# Patient Record
Sex: FEMALE | Race: WHITE | Employment: OTHER | URBAN - METROPOLITAN AREA
[De-identification: names, ages, dates, MRNs, and addresses within clinical notes are randomized per-mention and may not be internally consistent; named-entity substitution may affect disease eponyms.]

---

## 2017-02-13 NOTE — PROGRESS NOTES
[place] [facility]    Chief Complaint   Patient presents with     Dementia     HPI:    Yeni Pérez is a 89 year old  (2/9/1927), who is being seen today for a federally mandated E/M visit at [facility]. Today's concerns are:  Seizure disorder (H)  Remains on Keppra.  No obvious seizures    Benign essential hypertension  BP ranges in past month: 124/60 - 142/78.  No reports of chest pain or SOB.    PVD (peripheral vascular disease) (H)  No reports of pedal wounds.    Senile dementia without behavioral disturbance  Ongoing memory loss.  Can be very resistant to staff attempts to assist with hygiene.  No reports of recent acute illness or injury.    ALLERGIES: Boniva [ibandronic acid]; Food; Lexapro [escitalopram]; and Zoloft [sertraline]  PAST MEDICAL HISTORY:  has a past medical history of Abnormal mammogram (3./12/2015: 4mm grouping of calcificaions in the posterior central right breast) (11/9/2015); Advanced directives, counseling/discussion, POLST completed 11/9/15, DNR/DNI (11/9/2015); Ataxia (11/9/2015); Benign essential hypertension (11/9/2015); Bilateral dry eyes (11/9/2015); Cardiomegaly (11/9/2015); Chronic fatigue (11/9/2015); Cyst on ear, left, 2/25/2013 (11/9/2015); Diverticulosis of large intestine without hemorrhage (11/9/2015); External hemorrhoids (11/14/2016); Ganglion cyst, 1st MTP left 3/25/2009 (11/9/2015); H/O renal calculi, 1987 (11/9/2015); H/O small bowel obstruction, lysis of adhesions, 1970 (11/9/2015); H/O squamous cell carcinoma of skin, right hand, 11/28/2011 (11/9/2015); Heart murmur (11/9/2015); History of hepatitis A  in 1980 (11/5/2015); History of hepatitis B in 1980 (11/5/2015); Low weight (12/4/2015); Major depression in complete remission (H) (11/9/2015); Osteoporosis, Bone Density 2013: Femeral neck: -2.6 (L) and -2.3 (R) (11/9/2015); Primary osteoarthritis involving multiple joints (11/9/2015); PVC's (premature  ventricular contractions) (11/9/2015); PVD (peripheral vascular disease) (H) (11/9/2015); Seizure disorder (H) (11/9/2015); Senile dementia without behavioral disturbance (12/4/2015); and Shingles.  PAST SURGICAL HISTORY:  has a past surgical history that includes appendectomy; Cystectomy ovarian benign; LAP,LYSIS OF ADHESIONS; Bunionectomy; and cataract iol, rt/lt.  FAMILY HISTORY: family history includes Colon Cancer in her mother; Depression in her daughter; Leukemia in her brother; Myocardial Infarction in her father; Ovarian Cancer in her sister.  SOCIAL HISTORY:  reports that she has quit smoking. She does not have any smokeless tobacco history on file. She reports that she drinks alcohol.    MEDICATIONS:  Current Outpatient Prescriptions   Medication Sig Dispense Refill     docusate sodium (COLACE) 100 MG capsule Take 2 capsules (200 mg) by mouth daily 60 capsule      Misc Natural Products (GLUCOSAMINE CHOND COMPLEX/MSM PO) Take 1 tablet by mouth daily       methylphenidate (RITALIN LA) 20 MG CP24 Take 20 mg by mouth every other day       allopurinol (ZYLOPRIM) 300 MG tablet Take 300 mg by mouth daily       aspirin 325 MG EC tablet Take 325 mg by mouth daily       irbesartan (AVAPRO) 75 MG tablet Take 75 mg by mouth daily       levETIRAcetam (KEPPRA) 500 MG tablet Take 500 mg by mouth daily       Medications reviewed:  Medications reconciled to facility chart and changes were made to reflect current medications as identified as above med list. Below are the changes that were made:   Medications stopped since last EPIC medication reconciliation:   Medications Discontinued During This Encounter   Medication Reason     Multiple Vitamins-Minerals (MULTIVITAMIN PO) Therapy completed     cholecalciferol (VITAMIN D) 1000 UNIT tablet Therapy completed     calcium-vitamin D (CALTRATE) 600-400 MG-UNIT per tablet Therapy completed       Medications started since last [facility] medication reconciliation:  No orders of the  defined types were placed in this encounter.    Patient Active Problem List   Diagnosis     History of hepatitis A  in 1980     History of hepatitis B in 1980     Cyst on ear, left, 2/25/2013     H/O squamous cell carcinoma of skin, right hand, 11/28/2011     Ganglion cyst, 1st MTP left 3/25/2009     Seizure disorder (H)     H/O renal calculi, 1987     PVC's (premature ventricular contractions)     H/O small bowel obstruction, lysis of adhesions, 1970     Osteoporosis, Bone Density 2013: Femeral neck: -2.6 (L) and -2.3 (R)     Cardiomegaly     Diverticulosis of large intestine without hemorrhage     Bilateral dry eyes     Heart murmur     Ataxia     Chronic fatigue     Major depression in complete remission (H)     Benign essential hypertension     Primary osteoarthritis involving multiple joints     PVD (peripheral vascular disease) (H)     Abnormal mammogram (3./12/2015: 4mm grouping of calcificaions in the posterior central right breast)     Advance Care Planning     Senile dementia without behavioral disturbance     Low weight     External hemorrhoids     Case Management:  I have reviewed the care plan and do agree with the plan. Patient resides in community assisted living environment. .  Information reviewed:  Medications, vital signs, orders, and nursing notes.    ROS:  Negative selected, CONSTITUTIONAL: Family reports positive for decline in appetite and loss of weight prior to admission to [facility] since admission.., EYES: Positive for h/o cataract extraction and IOL. Wears glasses., ENT: dysphagia and Positive for hearing loss and bilateral hearing aids., CV: chest pain and lower extremity edema, RESPIRATORY: shortness of breath, cough and asthma, : dysuria and Nocturia, GI: abdominal pain, constipation, diarrhea, nausea and vomiting, NEURO: headaches and Positive for seizure disorder, declining cognition and reports of ataxia., PSYCH: Positive for h/o depression. and MUSCULOSKELETAL:  Positive for h/o osteoporosis and ataxia.  She refuses to use assistive devices for walking    Exam:  /60  Pulse 58  Temp 98  F (36.7  C)  Resp 18  Wt 110 lb (49.9 kg)  BMI 22.22 kg/m2  GENERAL APPEARANCE: Alert, thin, cooperative, female seen. Able to respond verbally. Denies any acute distress. Pale facial color.    HEAD:  Normocephalic.  No facial asymmetry  ENT: Mouth and posterior oropharynx normal, moist mucous membranes, [place], Has her own teeth. Has bilateral hearing aids.    EYES: EOM normal, conjunctiva and lids normal, Wearing eyeglasses.  RESP: Quiet, effortless respirations. No cough. CTA bilaterally.  CV: RRR, Grade 2/6 cardaic murmur. No LE edema.  DP pulses +1 bilateral.  ABDOMEN: Soft thin abdomen. BS's positive all 4 quadrants. No tenderness with palpation. No guarding.   M/S: Slow gait. No pain with ROM of extremities. Crepitus noted on both knees, (R>L)>  NEURO: Oriented to person and loosely to place. No tremors or cogwheeling. Purposeful movement of all extremities without focal weakness. Frequent repetitive statements.  Could not retain a direction given.  SKIN:  Three small open areas near hairline on forehead.  Has a callous (flesh colored) on left outer ankle and redness on right medial 1st metatarsal head.     PSYCH: Mood was positive and bright.     Lab/Diagnostic data:      CBC RESULTS:   Recent Labs   Lab Test 10/17/16 08/01/16  11/12/15 02/13/15   WBC   --   5.7   --   8.3  7.2   RBC   --   3.52   --   3.22*  3.76*   HGB  12.0  11.7   < >  10.9*  12.5   HCT   --   36.3   --   33.5*  38.5   MCV   --   103.1   --   104.0*  102*   MCH   --    --    --    --   33.2*   MCHC   --    --    --    --   32.6   RDW   --   13.4   --   13.5  13.8   PLT   --   226   --   196  223    < > = values in this interval not displayed.       Last Basic Metabolic Panel:  Recent Labs   Lab Test 10/17/16 08/01/16   NA  145  143   POTASSIUM  4.6  4.7   CHLORIDE  108  107   BRIJESH  9.5  9.4   CO2  29   --    BUN   21  21   CR  0.88  0.90   GLC  84  82       Liver Function Studies -   Recent Labs   Lab Test 10/17/16 02/13/15   PROTTOTAL  5.9  6.3   ALBUMIN  3.4  4.3   BILITOTAL  1.4  1.1   ALKPHOS  68  72   AST  21  23   ALT  13  16       TSH   Date Value Ref Range Status   11/12/2015 1.33 mcU/mL Final   ]    ASSESSMENT/PLAN  (G40.909) Seizure disorder (H)  (primary encounter diagnosis)  Comment: Chronic, but no obvious seizures  Plan: Continue current Keppra dose and monitor.    (I10) Benign essential hypertension  Comment: BP goal;: <150/90, at goal  Plan: Continue Avapro and ASA.  Monitor.  Hgb and BMP next lab day,    (I73.9) PVD (peripheral vascular disease) (H)  Comment: Chronic  Plan: Monitor pedal skin.    (F03.90) Senile dementia without behavioral disturbance  Comment: Slowly advancing  Plan: Monitor.  Appears to be appropriate for assisted living at this time.    Electronically signed by:  RAFAEL Flowers CNP

## 2017-02-14 ENCOUNTER — NURSING HOME VISIT (OUTPATIENT)
Dept: GERIATRICS | Facility: CLINIC | Age: 82
End: 2017-02-14
Payer: MEDICARE

## 2017-02-14 VITALS
HEART RATE: 58 BPM | TEMPERATURE: 98 F | BODY MASS INDEX: 22.22 KG/M2 | RESPIRATION RATE: 18 BRPM | DIASTOLIC BLOOD PRESSURE: 60 MMHG | SYSTOLIC BLOOD PRESSURE: 124 MMHG | WEIGHT: 110 LBS

## 2017-02-14 DIAGNOSIS — G40.909 SEIZURE DISORDER (H): Primary | ICD-10-CM

## 2017-02-14 DIAGNOSIS — I10 BENIGN ESSENTIAL HYPERTENSION: ICD-10-CM

## 2017-02-14 DIAGNOSIS — F03.90 SENILE DEMENTIA WITHOUT BEHAVIORAL DISTURBANCE (H): ICD-10-CM

## 2017-02-14 DIAGNOSIS — I73.9 PVD (PERIPHERAL VASCULAR DISEASE) (H): ICD-10-CM

## 2017-02-20 ENCOUNTER — TRANSFERRED RECORDS (OUTPATIENT)
Dept: HEALTH INFORMATION MANAGEMENT | Facility: CLINIC | Age: 82
End: 2017-02-20

## 2017-02-20 LAB
BUN SERPL-MCNC: 21 MG/DL (ref 9–26)
CALCIUM SERPL-MCNC: 9.7 MG/DL (ref 8.4–10.2)
CHLORIDE SERPLBLD-SCNC: 104 MMOL/L (ref 98–109)
CO2 SERPL-SCNC: 28 MMOL/L (ref 22–31)
CREAT SERPL-MCNC: 0.82 MG/DL (ref 0.55–1.02)
GFR SERPL CREATININE-BSD FRML MDRD: >60 ML/MIN/1.73M2
GLUCOSE SERPL-MCNC: 92 MG/DL (ref 70–100)
HEMOGLOBIN: 12.6 G/DL (ref 11.8–15.5)
POTASSIUM SERPL-SCNC: 4.3 MMOL/L (ref 3.5–5.2)
SODIUM SERPL-SCNC: 142 MMOL/L (ref 136–145)

## 2017-04-06 NOTE — PROGRESS NOTES
"[place] [facility]    Chief Complaint   Patient presents with     RECHECK       HPI:    Yeni Pérez is a 89 year old  (2/9/1927), who is being seen today for an episodic care visit at [facility].   Today's concern is:  Cardiac arrhythmia, unspecified cardiac arrhythmia type  Pt has h/o PVC's.  Today on auscultation, had irregular rhythm.  Pt denies chest pain or palpitations and reports \"I feel well\".    Senile dementia without behavioral disturbance  Advancing cognitive loss.  Lacks insight into cognitive loss.  Receives assistance with ADL's,.  Can be very resistive to basic hygiene attempts    Low weight  Refusing ensure.  Current weight reflects 10-13 lbs weight loss, if it is correct.     Major depression in complete remission (H)  Remains on ritalin qod.  No reports of any increase in symptoms and pt denies feelings of sadness.    Benign essential hypertension  No reports of chest pain or SOB.  Remains on Avapro.  Also on ASA qd.      ALLERGIES: Boniva [ibandronic acid]; Food; Lexapro [escitalopram]; and Zoloft [sertraline]  Past Medical, Surgical, Family and Social History reviewed and updated in [facility].    Current Outpatient Prescriptions   Medication Sig Dispense Refill     docusate sodium (COLACE) 100 MG capsule Take 2 capsules (200 mg) by mouth daily 60 capsule      Misc Natural Products (GLUCOSAMINE CHOND COMPLEX/MSM PO) Take 1 tablet by mouth daily       methylphenidate (RITALIN LA) 20 MG CP24 Take 20 mg by mouth every other day       allopurinol (ZYLOPRIM) 300 MG tablet Take 300 mg by mouth daily       aspirin 325 MG EC tablet Take 325 mg by mouth daily       irbesartan (AVAPRO) 75 MG tablet Take 75 mg by mouth daily       levETIRAcetam (KEPPRA) 500 MG tablet Take 500 mg by mouth daily       Patient Active Problem List   Diagnosis     History of hepatitis A  in 1980     History of hepatitis B in 1980     Cyst on ear, left, 2/25/2013     H/O " squamous cell carcinoma of skin, right hand, 11/28/2011     Ganglion cyst, 1st MTP left 3/25/2009     Seizure disorder (H)     H/O renal calculi, 1987     PVC's (premature ventricular contractions)     H/O small bowel obstruction, lysis of adhesions, 1970     Osteoporosis, Bone Density 2013: Femeral neck: -2.6 (L) and -2.3 (R)     Cardiomegaly     Diverticulosis of large intestine without hemorrhage     Bilateral dry eyes     Heart murmur     Ataxia     Chronic fatigue     Major depression in complete remission (H)     Benign essential hypertension     Primary osteoarthritis involving multiple joints     PVD (peripheral vascular disease) (H)     Abnormal mammogram (3./12/2015: 4mm grouping of calcificaions in the posterior central right breast)     Advance Care Planning     Senile dementia without behavioral disturbance     Low weight     External hemorrhoids       REVIEW OF SYSTEMS:  Unobtainable secondary to cognitive impairment or aphasia, but today pt reports no pain or distress    Physical Exam:  /72  Pulse 88  Temp 97.3  F (36.3  C)  Resp 20  Wt 98 lb 8 oz (44.7 kg)  SpO2 96%  BMI 19.89 kg/m2     GENERAL APPEARANCE: Alert, thin, cooperative, female seen. Able to respond verbally. Denies any acute distress. Pale facial color.    HEAD: Normocephalic. No facial asymmetry  ENT: Mouth and posterior oropharynx normal, moist mucous membranes, [place], Has her own teeth. Has left ear hearing aide in place.    EYES: EOM normal, conjunctiva and lids normal, Wearing eyeglasses.  RESP: Quiet, effortless respirations. No cough. CTA bilaterally.  CV: Irregular rhythm, Grade 2/6 cardaic murmur. No LE edema. DP pulses +1 bilateral.  ABDOMEN: Soft thin abdomen. BS's positive all 4 quadrants. No tenderness with palpation. No guarding.   M/S: Slow gait. No pain with ROM of extremities. Crepitus noted on both knees, (R>L)>  NEURO: Oriented to person and loosely to place. No tremors or cogwheeling. Purposeful movement of  all extremities without focal weakness. Frequent repetitive statements. Could not retain a direction given.  PSYCH: Mood was positive and bright.    Recent Labs:      CBC RESULTS:   Recent Labs   Lab Test 02/20/17 10/17/16 08/01/16  11/12/15 02/13/15   WBC   --    --   5.7   --   8.3  7.2   RBC   --    --   3.52   --   3.22*  3.76*   HGB  12.6  12.0  11.7   < >  10.9*  12.5   HCT   --    --   36.3   --   33.5*  38.5   MCV   --    --   103.1   --   104.0*  102*   MCH   --    --    --    --    --   33.2*   MCHC   --    --    --    --    --   32.6   RDW   --    --   13.4   --   13.5  13.8   PLT   --    --   226   --   196  223    < > = values in this interval not displayed.       Last Basic Metabolic Panel:  Recent Labs   Lab Test 02/20/17 10/17/16   NA  142  145   POTASSIUM  4.3  4.6   CHLORIDE  104  108   BRIJESH  9.7  9.5   CO2  28  29   BUN  21  21   CR  0.82  0.88   GLC  92  84     GFR Estimate   Date Value Ref Range Status   02/20/2017 >60 >60 ml/min/1.73m2 Final   10/17/2016 58 ml/min/1.73m2 Final   08/01/2016 >60 ml/min/1.73m2 Final     TSH   Date Value Ref Range Status   11/12/2015 1.33 mcU/mL Final     Assessment/Plan:  (I49.9) Cardiac arrhythmia, unspecified cardiac arrhythmia type  (primary encounter diagnosis)  Comment: New finding  Plan: STAT EKG.  Continue ASA.  Dr. Garcia to see on 4/17.  CBC and BMP on Monday.    (F03.90) Senile dementia without behavioral disturbance  Comment: Advancing  Plan: Anticipate further progression.  Monitor for safety and appropriateness of AL environment.    (R63.6) Low weight  Comment: Ongoing, refuses supplement  Plan: Nsg to reweigh ASAP to verify accurateness of weight.    (F32.5) Major depression in complete remission (H)  Comment: Chronic, but controlled  Plan: Continue qod ritalin.  Review with Dr. Garcia.  If pt is in atrial fibrillation, would need to consider dc of the ritalin.    (I10) Benign essential hypertension  Comment: BP goal; <150/90, at goal  Plan: Continue  POC.      Electronically signed by  RAFAEL Flowers CNP

## 2017-04-07 ENCOUNTER — ASSISTED LIVING VISIT (OUTPATIENT)
Dept: GERIATRICS | Facility: CLINIC | Age: 82
End: 2017-04-07
Payer: MEDICARE

## 2017-04-07 VITALS
OXYGEN SATURATION: 96 % | HEART RATE: 88 BPM | WEIGHT: 98.5 LBS | TEMPERATURE: 97.3 F | SYSTOLIC BLOOD PRESSURE: 138 MMHG | BODY MASS INDEX: 19.89 KG/M2 | RESPIRATION RATE: 20 BRPM | DIASTOLIC BLOOD PRESSURE: 72 MMHG

## 2017-04-07 DIAGNOSIS — F03.90 SENILE DEMENTIA WITHOUT BEHAVIORAL DISTURBANCE (H): ICD-10-CM

## 2017-04-07 DIAGNOSIS — R63.6 LOW WEIGHT: ICD-10-CM

## 2017-04-07 DIAGNOSIS — I49.9 CARDIAC ARRHYTHMIA, UNSPECIFIED CARDIAC ARRHYTHMIA TYPE: Primary | ICD-10-CM

## 2017-04-07 DIAGNOSIS — F32.5 MAJOR DEPRESSION IN COMPLETE REMISSION (H): ICD-10-CM

## 2017-04-07 DIAGNOSIS — I10 BENIGN ESSENTIAL HYPERTENSION: ICD-10-CM

## 2017-04-07 PROCEDURE — 99207 ZZC CDG-CORRECTLY CODED, REVIEWED AND AGREE: CPT | Performed by: NURSE PRACTITIONER

## 2017-04-08 ENCOUNTER — TELEPHONE (OUTPATIENT)
Dept: GERIATRICS | Facility: CLINIC | Age: 82
End: 2017-04-08

## 2017-04-08 ENCOUNTER — TRANSFERRED RECORDS (OUTPATIENT)
Dept: HEALTH INFORMATION MANAGEMENT | Facility: CLINIC | Age: 82
End: 2017-04-08

## 2017-04-08 NOTE — TELEPHONE ENCOUNTER
Called by nursing staff at [facility] about EKG results.  Done today, confirms atrial fib.   Patient has no symptoms, no CP, dyspnea or palpitations.   Reports she feels well.     Reviewed note, will hold Ritalin until seen by primary team next week.   Juany Bose MD

## 2017-04-10 ENCOUNTER — NURSING HOME VISIT (OUTPATIENT)
Dept: GERIATRICS | Facility: CLINIC | Age: 82
End: 2017-04-10
Payer: MEDICARE

## 2017-04-10 ENCOUNTER — TRANSFERRED RECORDS (OUTPATIENT)
Dept: HEALTH INFORMATION MANAGEMENT | Facility: CLINIC | Age: 82
End: 2017-04-10

## 2017-04-10 VITALS
OXYGEN SATURATION: 94 % | RESPIRATION RATE: 16 BRPM | DIASTOLIC BLOOD PRESSURE: 70 MMHG | HEART RATE: 88 BPM | TEMPERATURE: 98 F | SYSTOLIC BLOOD PRESSURE: 122 MMHG

## 2017-04-10 DIAGNOSIS — I48.20 CHRONIC ATRIAL FIBRILLATION (H): Primary | ICD-10-CM

## 2017-04-10 DIAGNOSIS — F32.5 MAJOR DEPRESSION IN COMPLETE REMISSION (H): ICD-10-CM

## 2017-04-10 DIAGNOSIS — I10 BENIGN ESSENTIAL HYPERTENSION: ICD-10-CM

## 2017-04-10 DIAGNOSIS — F03.90 SENILE DEMENTIA WITHOUT BEHAVIORAL DISTURBANCE (H): ICD-10-CM

## 2017-04-10 LAB
BUN SERPL-MCNC: 16 MG/DL (ref 9–26)
CALCIUM SERPL-MCNC: 9.4 MG/DL (ref 8.4–10.2)
CHLORIDE SERPLBLD-SCNC: 105 MMOL/L (ref 98–109)
CO2 SERPL-SCNC: 28 MMOL/L (ref 22–31)
CREAT SERPL-MCNC: 0.73 MG/DL (ref 0.55–1.02)
ERYTHROCYTE [DISTWIDTH] IN BLOOD BY AUTOMATED COUNT: 13.9 % (ref 11–15)
GFR SERPL CREATININE-BSD FRML MDRD: >60 ML/MIN/1.73M2
GLUCOSE SERPL-MCNC: 100 MG/DL (ref 70–100)
HCT VFR BLD AUTO: 38 % (ref 35–46)
HEMOGLOBIN: 12.1 G/DL (ref 11.8–15.5)
MCV RBC AUTO: 101.6 FL (ref 80–100)
PLATELET # BLD AUTO: 261 K/CMM (ref 140–450)
POTASSIUM SERPL-SCNC: 4 MMOL/L (ref 3.5–5.2)
RBC # BLD AUTO: 3.74 M/CMM (ref 3.7–5.2)
SODIUM SERPL-SCNC: 141 MMOL/L (ref 136–145)
WBC # BLD AUTO: 7.1 K/CMM (ref 3.8–11)

## 2017-04-10 PROCEDURE — 99207 ZZC CDG-CORRECTLY CODED, REVIEWED AND AGREE: CPT | Performed by: NURSE PRACTITIONER

## 2017-04-10 RX ORDER — METOPROLOL SUCCINATE 25 MG/1
25 TABLET, EXTENDED RELEASE ORAL DAILY
Qty: 30 TABLET | Refills: 1
Start: 2017-04-10 | End: 2017-11-03

## 2017-04-10 RX ORDER — METHYLPHENIDATE HYDROCHLORIDE 20 MG/1
CAPSULE, EXTENDED RELEASE ORAL
Start: 2017-04-10 | End: 2017-06-15

## 2017-04-10 NOTE — PROGRESS NOTES
[place] [facility]    Chief Complaint   Patient presents with     Heart Problem       HPI:    Yeni Pérez is a 89 year old  (2/9/1927), who is being seen today for an episodic care visit at [facility]. Today's concern is:  Chronic atrial fibrillation (H)  Pt noted to have irregularly irregular rhythm on 4/7.  EKG confirmed atrial fibrillation. On call notified and ritalin is now on hold.  Pt lacks insight into this.  Denies chest pain or palpitations but has short term memory loss, so is not a reliable historian.  Ambulates without walker, even though one has been recommended.  Fall risk is high.  Very frail.    Benign essential hypertension  No reports of chest pain.  Has been on Avapro for many years.    Senile dementia without behavioral disturbance  Advancing memory loss.  Significant short term memory impairment.  Is resistive to efforts to provide assistance with hygiene.  Resides in assisted living environment.  No attempts to elope.     Major depression in complete remission (H)  Has been on ritalin for many years.  Has tolerated reduction of dose and now med has been on hold for 4 days without obvious adverse reactions.      ALLERGIES: Boniva [ibandronic acid]; Food; Lexapro [escitalopram]; and Zoloft [sertraline]  Past Medical, Surgical, Family and Social History reviewed and updated in [facility].    Current Outpatient Prescriptions   Medication Sig Dispense Refill     docusate sodium (COLACE) 100 MG capsule Take 2 capsules (200 mg) by mouth daily 60 capsule      Misc Natural Products (GLUCOSAMINE CHOND COMPLEX/MSM PO) Take 1 tablet by mouth daily       methylphenidate (RITALIN LA) 20 MG CP24 Take 20 mg by mouth every other day (Patient taking differently: Take 20 mg by mouth every other day On Hold)       allopurinol (ZYLOPRIM) 300 MG tablet Take 300 mg by mouth daily       aspirin 325 MG EC tablet Take 325 mg by mouth daily       irbesartan (AVAPRO) 75  MG tablet Take 75 mg by mouth daily       levETIRAcetam (KEPPRA) 500 MG tablet Take 500 mg by mouth daily       Medications reviewed:  Medications reconciled to facility chart and changes were made to reflect current medications as identified as above med list. Below are the changes that were made:   Medications stopped since last EPIC medication reconciliation:   There are no discontinued medications.    Medications started since last [facility] medication reconciliation:  No orders of the defined types were placed in this encounter.    Patient Active Problem List   Diagnosis     History of hepatitis A  in 1980     History of hepatitis B in 1980     Cyst on ear, left, 2/25/2013     H/O squamous cell carcinoma of skin, right hand, 11/28/2011     Ganglion cyst, 1st MTP left 3/25/2009     Seizure disorder (H)     H/O renal calculi, 1987     PVC's (premature ventricular contractions)     H/O small bowel obstruction, lysis of adhesions, 1970     Osteoporosis, Bone Density 2013: Femeral neck: -2.6 (L) and -2.3 (R)     Cardiomegaly     Diverticulosis of large intestine without hemorrhage     Bilateral dry eyes     Heart murmur     Ataxia     Chronic fatigue     Major depression in complete remission (H)     Benign essential hypertension     Primary osteoarthritis involving multiple joints     PVD (peripheral vascular disease) (H)     Abnormal mammogram (3./12/2015: 4mm grouping of calcificaions in the posterior central right breast)     Advance Care Planning     Senile dementia without behavioral disturbance     Low weight     External hemorrhoids     Chronic atrial fibrillation (H)       REVIEW OF SYSTEMS:  Unobtainable secondary to cognitive impairment or aphasia, but today pt reports no pain or distress    Physical Exam:  /70  Pulse 88  Temp 98  F (36.7  C)  Resp 16  SpO2 94%  GENERAL APPEARANCE: Alert, thin, cooperative, female seen. Able to respond verbally. Denies any acute distress. Pale facial color.    HEAD:  Normocephalic. No facial asymmetry  ENT: Mouth and posterior oropharynx normal, moist mucous membranes, [place], Has her own teeth. Has left ear hearing aide in place.    EYES: EOM normal, conjunctiva and lids normal, Wearing eyeglasses.  RESP: Quiet, effortless respirations. No cough. CTA bilaterally.  CV: Irregular rhythm, Grade 2/6 cardaic murmur. No LE edema. DP pulses +1 bilateral.  ABDOMEN: Soft thin abdomen. BS's positive all 4 quadrants. No tenderness with palpation. No guarding.   M/S: Slow gait. No pain with ROM of extremities. Crepitus noted on both knees, (R>L)>  NEURO: Oriented to person and loosely to place. No tremors or cogwheeling. Purposeful movement of all extremities without focal weakness. Frequent repetitive statements. Could not retain a direction given.  PSYCH: Mood was positive and bright.    Recent Labs:    CBC RESULTS:   4/10/17:  WBC: 7.1, Hgb: 12.1, Platelet: 261  Recent Labs   Lab Test 02/20/17 10/17/16 08/01/16  11/12/15 02/13/15   WBC   --    --   5.7   --   8.3  7.2   RBC   --    --   3.52   --   3.22*  3.76*   HGB  12.6  12.0  11.7   < >  10.9*  12.5   HCT   --    --   36.3   --   33.5*  38.5   MCV   --    --   103.1   --   104.0*  102*   MCH   --    --    --    --    --   33.2*   MCHC   --    --    --    --    --   32.6   RDW   --    --   13.4   --   13.5  13.8   PLT   --    --   226   --   196  223    < > = values in this interval not displayed.       Last Basic Metabolic Panel:  4/10/17:  Glucose: 100, BUN: 16, creat: 0.73, est GFR >60, NA: 141, K: 4.0, Chl: 105, CO2: 28.  Recent Labs   Lab Test 02/20/17 10/17/16   NA  142  145   POTASSIUM  4.3  4.6   CHLORIDE  104  108   BRIJESH  9.7  9.5   CO2  28  29   BUN  21  21   CR  0.82  0.88   GLC  92  84     GFR Estimate   Date Value Ref Range Status   04/10/2017 >60 >60 ml/min/1.73m2 Final   02/20/2017 >60 >60 ml/min/1.73m2 Final   10/17/2016 58 ml/min/1.73m2 Final   08/01/2016 >60 ml/min/1.73m2 Final   04/14/2016 60 ml/min/1.73m2 Final      4/8/17:  EKG:  Atrial fibrillation    Family Communication:  Son Maonlo updated with new diagnosis of atrial fibrillation.  Discussed further reduction of ritalin with plan to discontinue medication.  Also to change from Avapro to metoprolol succinate for both BP and pulse control.  Also discussed stroke risk and types of anticoagulants available for reduction of stroke risk.  Due to her poor and unreliable food intake, as well as fall risk, her risk if on coumadin or one of the newer anticoagulants, would likely make her a poor candidate for such therapy.  Discussed use of ASA.  He expressed understanding and agreement with continuing aspirin vs use of coumadin and agrees with plan to change her to metoprolol as wean off Ritalin.  He asked for a call with any change in condition as well as update following Dr. Garcia's visit next Monday.    Assessment/Plan:  (I48.2) Chronic atrial fibrillation (H)  (primary encounter diagnosis)  Comment: New dx  Plan: Wean off Ritalin.  Change from avapro to metoprolol succinate for BP and pulse rate control.  Continue ASA for anticoagulation.  Nsg to check BP and pulse qd for one week and update NP.  Dr. Garcia to see on Monday.    (I10) Benign essential hypertension  Comment: BP goal; <150/90, at goal  Plan: Change from Avapro to Metoprolol succinate as noted above.    (F03.90) Senile dementia without behavioral disturbance  Comment: Advancing  Plan: Continue to monitor for safety.    (F32.5) Major depression in complete remission (H)  Comment: Chronic, with no depressive symptoms at this time  Plan: Decrease ritalin to 20 mg XR today, Thursday and Sunday and nsg to update me in one week.  If doing OK, will discontinue medication.    Total time with patient visit: 40 minutes including discussions about the POC and care coordination with the patient, family,  and caregiver. Greater than 50% of total time spent with counseling and coordinating care.    Electronically signed  by  RAFAEL Flowers CNP

## 2017-04-15 ENCOUNTER — TELEPHONE (OUTPATIENT)
Dept: GERIATRICS | Facility: CLINIC | Age: 82
End: 2017-04-15

## 2017-04-15 NOTE — TELEPHONE ENCOUNTER
Patient has audible wheezing  Vitals stable, SaO2 is 98%  Patient states she is feeling well, does not appear SOB, no cough but nurse states she is just wheezing  Eating and drinking OK, was down for lunch, no other concerns  Orders:   Albuterol MDI 108mcg/act 2 puffs BID  Update NP on monday

## 2017-04-17 ENCOUNTER — ASSISTED LIVING VISIT (OUTPATIENT)
Dept: GERIATRICS | Facility: CLINIC | Age: 82
End: 2017-04-17
Payer: MEDICARE

## 2017-04-17 DIAGNOSIS — I48.20 CHRONIC ATRIAL FIBRILLATION (H): Primary | ICD-10-CM

## 2017-04-17 DIAGNOSIS — F03.90 SENILE DEMENTIA WITHOUT BEHAVIORAL DISTURBANCE (H): ICD-10-CM

## 2017-04-17 DIAGNOSIS — I10 BENIGN ESSENTIAL HYPERTENSION: ICD-10-CM

## 2017-04-17 PROCEDURE — 99207 ZZC NO CHARGE LOS: CPT | Performed by: INTERNAL MEDICINE

## 2017-04-18 NOTE — PROGRESS NOTES
Pt was seen for an episodic AL visit  Case reviewed with NP and with Daughter, who was in attendance.    Pt was noted last week to have an irregular heart rhythm, with EKG confirming afib  Was started on Metoprolol for HR control and HTN (in lieu of irbesartan)  Ritalin is being weaned off    Pt had not obviously symptomatic with afib, however yesterday, was noted to have chest congestion and wheezing.  Symptoms appear resolved this am    Pt has not c/o chest pain, dizziness, palpitations      Exam  In NAD, oriented to person, walking slowly around apartment  HR low 60s  BP 110s-140/  Lungs clear  CV irreg, no M  Abd soft, non-tender  No LE edema      Assessment    afib with controlled VR, apparent new diagnosis. Not clearly symptomatic  No evidence to suggest an acute cardiac event, though Pt is a very poor historian secondary to dementia. Pt is at a prohibitive risk of complications with anticoagulation.  Daughter is in agreement with our assessment not to start anticoagulation    Chest congestion, wheezing yesterday, etiology unclear, resolved    Dementia, advanced    HTN, controlled, on Metoprolol    Plan  Continue to monitor BP and HR, adjust metoprolol as vitals dictate  No anticoagulation  Monitor resp status  TFTs  Continue wean off Ritalin, monitor mental status

## 2017-05-17 ENCOUNTER — TRANSFERRED RECORDS (OUTPATIENT)
Dept: HEALTH INFORMATION MANAGEMENT | Facility: CLINIC | Age: 82
End: 2017-05-17

## 2017-06-19 ENCOUNTER — ASSISTED LIVING VISIT (OUTPATIENT)
Dept: GERIATRICS | Facility: CLINIC | Age: 82
End: 2017-06-19
Payer: MEDICARE

## 2017-06-19 VITALS
RESPIRATION RATE: 16 BRPM | HEIGHT: 59 IN | SYSTOLIC BLOOD PRESSURE: 124 MMHG | BODY MASS INDEX: 24.19 KG/M2 | OXYGEN SATURATION: 93 % | HEART RATE: 64 BPM | TEMPERATURE: 96.3 F | WEIGHT: 120 LBS | DIASTOLIC BLOOD PRESSURE: 60 MMHG

## 2017-06-19 DIAGNOSIS — M15.0 PRIMARY OSTEOARTHRITIS INVOLVING MULTIPLE JOINTS: ICD-10-CM

## 2017-06-19 DIAGNOSIS — F32.5 MAJOR DEPRESSION IN COMPLETE REMISSION (H): ICD-10-CM

## 2017-06-19 DIAGNOSIS — F03.90 SENILE DEMENTIA WITHOUT BEHAVIORAL DISTURBANCE (H): ICD-10-CM

## 2017-06-19 DIAGNOSIS — I10 BENIGN ESSENTIAL HYPERTENSION: ICD-10-CM

## 2017-06-19 DIAGNOSIS — I48.20 CHRONIC ATRIAL FIBRILLATION (H): ICD-10-CM

## 2017-06-19 DIAGNOSIS — L30.9 DERMATITIS DUE TO UNKNOWN CAUSE: ICD-10-CM

## 2017-06-19 DIAGNOSIS — G40.909 SEIZURE DISORDER (H): Primary | ICD-10-CM

## 2017-06-19 RX ORDER — TRIAMCINOLONE ACETONIDE OINTMENT USP, 0.05% 0.5 MG/G
OINTMENT TOPICAL
Start: 2017-06-19 | End: 2017-10-12

## 2017-06-19 NOTE — PROGRESS NOTES
[facility]    Chief Complaint   Patient presents with     Chronic Disease Management       HPI:    Yeni Pérez is a 89 year old  (2/9/1927), who is being seen today for a visit at [facility].  HPI information obtained from: facility chart records, facility staff, patient report and [facility] chart reviewPt is unreliable historian due to memory loss.. Today's concerns are:  Seizure disorder (H)  Remains on Keppra.  No obvious recent seizures.    Major depression in complete remission (H)  Has been tapered off Ritalin.  No reports of signs of depressive symptoms. In the moment denies any feelings of sadness.    Benign essential hypertension  No reports of chest pain.  Remains on Metoprolol. No reports of obvious dizziness. Remains on ASA daily.    Primary osteoarthritis involving multiple joints  Pt denies any joint or back pain.    Senile dementia without behavioral disturbance  Slow cognitive decline.  Nsg administers medications and attempts to assist with hygiene needs.  She will often refuse.  Has an area on her scalp that she has scratched often, but refuses ointment or lotion to the area.    Chronic atrial fibrillation (H)  No reports of heart rates >100/min.  On ASA qd and metoprolol qd.    ALLERGIES: Boniva [ibandronic acid]; Food; Lexapro [escitalopram]; and Zoloft [sertraline]  PAST MEDICAL HISTORY:  has a past medical history of Abnormal mammogram (3./12/2015: 4mm grouping of calcificaions in the posterior central right breast) (11/9/2015); Advanced directives, counseling/discussion, POLST completed 11/9/15, DNR/DNI (11/9/2015); Ataxia (11/9/2015); Benign essential hypertension (11/9/2015); Bilateral dry eyes (11/9/2015); Cardiomegaly (11/9/2015); Chronic atrial fibrillation (H) (4/10/2017); Chronic fatigue (11/9/2015); Cyst on ear, left, 2/25/2013 (11/9/2015); Diverticulosis of large intestine without hemorrhage (11/9/2015); External  hemorrhoids (11/14/2016); Ganglion cyst, 1st MTP left 3/25/2009 (11/9/2015); H/O renal calculi, 1987 (11/9/2015); H/O small bowel obstruction, lysis of adhesions, 1970 (11/9/2015); H/O squamous cell carcinoma of skin, right hand, 11/28/2011 (11/9/2015); Heart murmur (11/9/2015); History of hepatitis A  in 1980 (11/5/2015); History of hepatitis B in 1980 (11/5/2015); Low weight (12/4/2015); Major depression in complete remission (H) (11/9/2015); Osteoporosis, Bone Density 2013: Femeral neck: -2.6 (L) and -2.3 (R) (11/9/2015); Primary osteoarthritis involving multiple joints (11/9/2015); PVC's (premature ventricular contractions) (11/9/2015); PVD (peripheral vascular disease) (H) (11/9/2015); Seizure disorder (H) (11/9/2015); Senile dementia without behavioral disturbance (12/4/2015); and Shingles.  PAST SURGICAL HISTORY:  has a past surgical history that includes appendectomy; Cystectomy ovarian benign; LAP,LYSIS OF ADHESIONS; Bunionectomy; and cataract iol, rt/lt.  FAMILY HISTORY: family history includes Colon Cancer in her mother; Depression in her daughter; Leukemia in her brother; Myocardial Infarction in her father; Ovarian Cancer in her sister.  SOCIAL HISTORY:  reports that she has quit smoking. She does not have any smokeless tobacco history on file. She reports that she drinks alcohol.    MEDICATIONS:  Current Outpatient Prescriptions   Medication Sig Dispense Refill     mupirocin (BACTROBAN) 2 % ointment Apply topically 2 times daily Apply to scalp       Docusate Sodium (COLACE PO) Take 100 mg by mouth daily       triamcinolone (KENALOG) 0.1 % ointment Apply topically 2 times daily Apply to right wrist       metoprolol (TOPROL-XL) 25 MG 24 hr tablet Take 1 tablet (25 mg) by mouth daily HS 30 tablet 1     Misc Natural Products (GLUCOSAMINE CHOND COMPLEX/MSM PO) Take 1 tablet by mouth daily       allopurinol (ZYLOPRIM) 300 MG tablet Take 300 mg by mouth daily       aspirin 325 MG EC tablet Take 325 mg by  mouth daily       levETIRAcetam (KEPPRA) 500 MG tablet Take 500 mg by mouth daily       Medications reviewed:  Medications reconciled to facility chart and changes were made to reflect current medications as identified as above med list. Below are the changes that were made:   Medications stopped since last EPIC medication reconciliation:   There are no discontinued medications.    Medications started since last [facility] medication reconciliation:  No orders of the defined types were placed in this encounter.    Patient Active Problem List   Diagnosis     History of hepatitis A  in 1980     History of hepatitis B in 1980     Cyst on ear, left, 2/25/2013     H/O squamous cell carcinoma of skin, right hand, 11/28/2011     Ganglion cyst, 1st MTP left 3/25/2009     Seizure disorder (H)     H/O renal calculi, 1987     PVC's (premature ventricular contractions)     H/O small bowel obstruction, lysis of adhesions, 1970     Osteoporosis, Bone Density 2013: Femeral neck: -2.6 (L) and -2.3 (R)     Cardiomegaly     Diverticulosis of large intestine without hemorrhage     Bilateral dry eyes     Heart murmur     Ataxia     Chronic fatigue     Major depression in complete remission (H)     Benign essential hypertension     Primary osteoarthritis involving multiple joints     PVD (peripheral vascular disease) (H)     Abnormal mammogram (3./12/2015: 4mm grouping of calcificaions in the posterior central right breast)     Advance Care Planning     Senile dementia without behavioral disturbance     Low weight     External hemorrhoids     Chronic atrial fibrillation (H)     Dermatitis due to unknown cause, scalp     Case Management:  I have reviewed the care plan and do agree with the plan. Patient's desire to return to the community is not assessible due to cognitive impairment.  Information reviewed:  Medications, vital signs, orders, and nursing notes.    ROS:  Unobtainable secondary to cognitive impairment, but today pt reports no  "pain or distress    Exam:  Vitals: /60  Pulse 64  Temp 96.3  F (35.7  C)  Resp 16  Ht 4' 11\" (1.499 m)  Wt 120 lb (54.4 kg)  SpO2 93%  BMI 24.24 kg/m2  BMI= Body mass index is 24.24 kg/(m^2).  GENERAL APPEARANCE: Alert, thin, cooperative, female seen. Able to respond verbally. Denies any acute distress. Pale facial color.    HEAD: Normocephalic. No facial asymmetry  ENT: Mouth and posterior oropharynx normal, moist mucous membranes, [place], Has her own teeth. Has left ear hearing aide in place.    EYES: EOM normal, conjunctiva and lids normal, Wearing eyeglasses.  RESP: Quiet, effortless respirations. No cough. CTA bilaterally.  CV: Irregular rhythm, Grade 2/6 cardaic murmur. No LE edema. DP pulses +1 bilateral.  ABDOMEN: Soft thin abdomen. BS's positive all 4 quadrants. No tenderness with palpation. No guarding.   M/S: Slow gait. No pain with ROM of extremities. Crepitus noted on both knees, (R>L)>  NEURO: Oriented to person and loosely to place. No tremors or cogwheeling. Purposeful movement of all extremities without focal weakness. Frequent repetitive statements.   SKIN:  Has excoriated area at mid hairline on forehead of approx 2 cm.  Hair has broken off in area.  Pt reports that she does not want medicine and \"just need to quit scratching\".  PSYCH: Mood was positive and bright.    Lab/Diagnostic data:    CBC RESULTS:   Recent Labs   Lab Test 04/10/17 02/20/17  08/01/16  02/13/15   WBC  7.1   --    --   5.7   < >  7.2   RBC  3.74   --    --   3.52   < >  3.76*   HGB  12.1  12.6   < >  11.7   < >  12.5   HCT  38.0   --    --   36.3   < >  38.5   MCV  101.6*   --    --   103.1   < >  102*   MCH   --    --    --    --    --   33.2*   MCHC   --    --    --    --    --   32.6   RDW  13.9   --    --   13.4   < >  13.8   PLT  261   --    --   226   < >  223    < > = values in this interval not displayed.       Last Basic Metabolic Panel:  Recent Labs   Lab Test 04/10/17 02/20/17   NA  141  142   POTASSIUM  " 4.0  4.3   CHLORIDE  105  104   BRIJESH  9.4  9.7   CO2  28  28   BUN  16  21   CR  0.73  0.82   GLC  100  92     GFR Estimate   Date Value Ref Range Status   04/10/2017 >60 >60 ml/min/1.73m2 Final   02/20/2017 >60 >60 ml/min/1.73m2 Final   10/17/2016 58 ml/min/1.73m2 Final   08/01/2016 >60 ml/min/1.73m2 Final   04/14/2016 60 ml/min/1.73m2 Final     ASSESSMENT/PLAN  (G40.909) Seizure disorder (H)  (primary encounter diagnosis)  Comment: Chronic, but no obvious recent seizures.  Plan: Continue daily Keppra and monitor.    (F32.5) Major depression in complete remission (H)  Comment: No reoccurrence with recent taper of Ritalin  Plan: Monitor for reoccurrence.    (I10) Benign essential hypertension  Comment: BP goal; <150/90, at goal  Plan: Continue current POC.    (M15.0) Primary osteoarthritis involving multiple joints  Comment: Chronic, pain controlled with glucosomine  Plan: Continue current POC.    (F03.90) Senile dementia without behavioral disturbance  Comment: Advancing slowly  Plan: Monitor for progression. May eventually need move to memory care unit.    (I48.2) Chronic atrial fibrillation (H)  Comment: Chronic  Plan: Continue current POC.    (L30.9) Dermatitis due to unknown cause, scalp  Comment: Acute  Plan: triamcinolone acetonide 0.05 % OINT bid if pt will permit.  Monitor for improvement    Electronically signed by:  RAFAEL Flowers CNP

## 2017-07-12 ENCOUNTER — TRANSFERRED RECORDS (OUTPATIENT)
Dept: HEALTH INFORMATION MANAGEMENT | Facility: CLINIC | Age: 82
End: 2017-07-12

## 2017-08-17 ENCOUNTER — ASSISTED LIVING VISIT (OUTPATIENT)
Dept: GERIATRICS | Facility: CLINIC | Age: 82
End: 2017-08-17
Payer: MEDICARE

## 2017-08-17 VITALS
HEART RATE: 74 BPM | WEIGHT: 108 LBS | RESPIRATION RATE: 16 BRPM | HEIGHT: 59 IN | SYSTOLIC BLOOD PRESSURE: 128 MMHG | TEMPERATURE: 97.4 F | BODY MASS INDEX: 21.77 KG/M2 | DIASTOLIC BLOOD PRESSURE: 60 MMHG

## 2017-08-17 DIAGNOSIS — F03.90 SENILE DEMENTIA WITHOUT BEHAVIORAL DISTURBANCE (H): ICD-10-CM

## 2017-08-17 DIAGNOSIS — M15.0 PRIMARY OSTEOARTHRITIS INVOLVING MULTIPLE JOINTS: ICD-10-CM

## 2017-08-17 DIAGNOSIS — I10 BENIGN ESSENTIAL HYPERTENSION: ICD-10-CM

## 2017-08-17 DIAGNOSIS — G40.909 SEIZURE DISORDER (H): Primary | ICD-10-CM

## 2017-08-17 RX ORDER — EMOLLIENT BASE
CREAM (GRAM) TOPICAL 2 TIMES DAILY
COMMUNITY

## 2017-08-17 NOTE — PROGRESS NOTES
[facility]    Chief Complaint   Patient presents with     RECHECK     HPI:    Yeni Pérez is a 89 year old  (2/9/1927), who is being [place] today [place] [facility]  HPI information obtained from: facility chart records, facility staff, patient report and [facility] chart review. Pt is unreliable historian due to dementia. .Today's concern is:  Seizure disorder (H)  No reports of obvious seizures.  Remains on Keppra    Benign essential hypertension  No reports of chest pain or falls.  No changes in her metoprolol dose.  Remains on ASA daily.    Primary osteoarthritis involving multiple joints  Pt denies pain today    Senile dementia without behavioral disturbance  Advancing dementia.  Resistive to some attempts to assist with hygiene.  Attends common dining.  No attempts to elope.    ALLERGIES: Boniva [ibandronic acid]; Food; Lexapro [escitalopram]; and Zoloft [sertraline]  Past Medical, Surgical, Family and Social History reviewed and updated in [facility].    Current Outpatient Prescriptions   Medication Sig Dispense Refill     emollient (VANICREAM) cream Apply topically 2 times daily       triamcinolone acetonide 0.05 % OINT Apply to dermatitis on scalp near hairline on forehead bid.       Docusate Sodium (COLACE PO) Take 100 mg by mouth daily       triamcinolone (KENALOG) 0.1 % ointment Apply topically 2 times daily Apply to right wrist       metoprolol (TOPROL-XL) 25 MG 24 hr tablet Take 1 tablet (25 mg) by mouth daily HS 30 tablet 1     Misc Natural Products (GLUCOSAMINE CHOND COMPLEX/MSM PO) Take 1 tablet by mouth daily       allopurinol (ZYLOPRIM) 300 MG tablet Take 300 mg by mouth daily       aspirin 325 MG EC tablet Take 325 mg by mouth daily       levETIRAcetam (KEPPRA) 500 MG tablet Take 500 mg by mouth daily       Medications reviewed:  Medications reconciled to facility chart and changes were made to reflect current medications as  "identified as above med list. Below are the changes that were made:   Medications stopped since last EPIC medication reconciliation:   Medications Discontinued During This Encounter   Medication Reason     mupirocin (BACTROBAN) 2 % ointment Therapy completed       Medications started since last [facility] medication reconciliation:  Orders Placed This Encounter   Medications     emollient (VANICREAM) cream     Sig: Apply topically 2 times daily     Patient Active Problem List   Diagnosis     History of hepatitis A  in 1980     History of hepatitis B in 1980     Cyst on ear, left, 2/25/2013     H/O squamous cell carcinoma of skin, right hand, 11/28/2011     Ganglion cyst, 1st MTP left 3/25/2009     Seizure disorder (H)     H/O renal calculi, 1987     PVC's (premature ventricular contractions)     H/O small bowel obstruction, lysis of adhesions, 1970     Osteoporosis, Bone Density 2013: Femeral neck: -2.6 (L) and -2.3 (R)     Cardiomegaly     Diverticulosis of large intestine without hemorrhage     Bilateral dry eyes     Heart murmur     Ataxia     Chronic fatigue     Major depression in complete remission (H)     Benign essential hypertension     Primary osteoarthritis involving multiple joints     PVD (peripheral vascular disease) (H)     Abnormal mammogram (3./12/2015: 4mm grouping of calcificaions in the posterior central right breast)     Advance Care Planning     Senile dementia without behavioral disturbance     Low weight     External hemorrhoids     Chronic atrial fibrillation (H)     Dermatitis due to unknown cause, scalp       REVIEW OF SYSTEMS:  Unobtainable secondary to cognitive impairment, but today pt reports no pain or distress    Physical Exam:  /60  Pulse 74  Temp 97.4  F (36.3  C)  Resp 16  Ht 4' 11\" (1.499 m)  Wt 108 lb (49 kg)  BMI 21.81 kg/m2  GENERAL APPEARANCE: Alert, thin, cooperative, female seen. Able to respond verbally. Denies any acute distress. Pale facial color.    HEAD: " Normocephalic. No facial asymmetry  ENT: Mouth and posterior oropharynx normal, moist mucous membranes, [place], Has her own teeth. Has left ear hearing aide in place.    EYES: EOM normal, conjunctiva and lids normal, Wearing eyeglasses.  RESP: Quiet, effortless respirations. No cough. CTA bilaterally.  CV: Irregular rhythm, Grade 2/6 cardaic murmur. No LE edema. DP pulses +1 bilateral.  ABDOMEN: Soft thin abdomen. BS's positive all 4 quadrants. No tenderness with palpation. No guarding.   M/S: Slow gait. No pain with ROM of extremities. Crepitus noted on both knees, (R>L)>  NEURO: Oriented to person and loosely to place. No tremors or cogwheeling. Purposeful movement of all extremities without focal weakness. Frequent repetitive statements.   PSYCH: Mood was positive and bright.    Recent Labs:    CBC RESULTS:   Recent Labs   Lab Test 04/10/17 02/20/17  08/01/16  02/13/15   WBC  7.1   --    --   5.7   < >  7.2   RBC  3.74   --    --   3.52   < >  3.76*   HGB  12.1  12.6   < >  11.7   < >  12.5   HCT  38.0   --    --   36.3   < >  38.5   MCV  101.6*   --    --   103.1   < >  102*   MCH   --    --    --    --    --   33.2*   MCHC   --    --    --    --    --   32.6   RDW  13.9   --    --   13.4   < >  13.8   PLT  261   --    --   226   < >  223    < > = values in this interval not displayed.       Last Basic Metabolic Panel:  Recent Labs   Lab Test 04/10/17 02/20/17   NA  141  142   POTASSIUM  4.0  4.3   CHLORIDE  105  104   BRIJESH  9.4  9.7   CO2  28  28   BUN  16  21   CR  0.73  0.82   GLC  100  92     GFR Estimate   Date Value Ref Range Status   04/10/2017 >60 >60 ml/min/1.73m2 Final   02/20/2017 >60 >60 ml/min/1.73m2 Final   10/17/2016 58 ml/min/1.73m2 Final     Liver Function Studies -   Recent Labs   Lab Test 10/17/16 02/13/15   PROTTOTAL  5.9  6.3   ALBUMIN  3.4  4.3   BILITOTAL  1.4  1.1   ALKPHOS  68  72   AST  21  23   ALT  13  16       TSH   Date Value Ref Range Status   11/12/2015 1.33 mcU/mL Final    ]    Assessment/Plan:  (G40.909) Seizure disorder (H)  (primary encounter diagnosis)  Comment: Chronic, no evidence of recent seizures.  Plan: Continue current POC.    (I10) Benign essential hypertension  Comment: BP goal: <150/90, at goal  Plan: Continue current POC.  BMP on Monday.    (M15.0) Primary osteoarthritis involving multiple joints  Comment: Chronic, pain controlled  Plan: Continue current POC.    (F03.90) Senile dementia without behavioral disturbance  Comment: Slowly advancing  Plan: Continue current pOC.    Electronically signed by  RAFAEL Flowers CNP

## 2017-08-22 ENCOUNTER — TELEPHONE (OUTPATIENT)
Dept: GERIATRICS | Facility: CLINIC | Age: 82
End: 2017-08-22

## 2017-08-22 DIAGNOSIS — K59.01 SLOW TRANSIT CONSTIPATION: Primary | ICD-10-CM

## 2017-08-22 RX ORDER — DOCUSATE SODIUM 100 MG/1
100 CAPSULE, LIQUID FILLED ORAL EVERY OTHER DAY
Qty: 60 CAPSULE
Start: 2017-08-22 | End: 2019-01-01

## 2017-08-22 NOTE — TELEPHONE ENCOUNTER
Yeni has been on LASHA.  He is concerned as she is on colace and had some incontinence of BM this morning, that she was unaware of.  He has noted a continued cognitive decline over the past year.  Discussed dementia and the fact that she will lose control of her bodily functions as the disease progresses, which was surprising to him.  He wants to plan for the future and discussed skilled memory care.  He would like her to stay at [facility] when that occurs.  Notified him that he should talk to admissions and if they desire a private room that she go on a waiting list.  He plans to speak to appropriate staff at [facility] tomorrow, when they return from the cabin.  Will decrease colace to qod.

## 2017-08-24 ENCOUNTER — TRANSFERRED RECORDS (OUTPATIENT)
Dept: HEALTH INFORMATION MANAGEMENT | Facility: CLINIC | Age: 82
End: 2017-08-24

## 2017-08-24 LAB
BUN SERPL-MCNC: 20 MG/DL (ref 9–26)
CALCIUM SERPL-MCNC: 9.4 MG/DL (ref 8.4–10.2)
CHLORIDE SERPLBLD-SCNC: 109 MMOL/L (ref 98–109)
CO2 SERPL-SCNC: 28 MMOL/L (ref 22–31)
CREAT SERPL-MCNC: 0.85 MG/DL (ref 0.55–1.02)
GFR SERPL CREATININE-BSD FRML MDRD: >60 ML/MIN/1.73M2
GLUCOSE SERPL-MCNC: 95 MG/DL (ref 70–100)
HEMOGLOBIN: 11.9 G/DL (ref 11.8–15.5)
POTASSIUM SERPL-SCNC: 4.4 MMOL/L (ref 3.5–5.2)
SODIUM SERPL-SCNC: 145 MMOL/L (ref 136–145)

## 2017-09-18 ENCOUNTER — ASSISTED LIVING VISIT (OUTPATIENT)
Dept: GERIATRICS | Facility: CLINIC | Age: 82
End: 2017-09-18

## 2017-09-18 DIAGNOSIS — G40.909 SEIZURE DISORDER (H): ICD-10-CM

## 2017-09-18 DIAGNOSIS — I10 BENIGN ESSENTIAL HYPERTENSION: ICD-10-CM

## 2017-09-18 DIAGNOSIS — F03.90 SENILE DEMENTIA WITHOUT BEHAVIORAL DISTURBANCE (H): Primary | ICD-10-CM

## 2017-09-20 NOTE — PROGRESS NOTES
Pt was seen for a regulatory AL visit  Case reviewed with NP    Overall status has been stable    Pt denies any specific physical concerns    VSS  BP stable  Alert, oriented to self  Lungs clear  CV rrr  Abd soft  No tremors      Assessment    Dementia, advanced  HTN, stable on metoprolol  Seizure d/o, stable on Keppra    Plan  Continue current tx

## 2017-10-12 ENCOUNTER — ASSISTED LIVING VISIT (OUTPATIENT)
Dept: GERIATRICS | Facility: CLINIC | Age: 82
End: 2017-10-12
Payer: MEDICARE

## 2017-10-12 VITALS
TEMPERATURE: 97 F | DIASTOLIC BLOOD PRESSURE: 70 MMHG | SYSTOLIC BLOOD PRESSURE: 132 MMHG | HEART RATE: 81 BPM | WEIGHT: 98 LBS | OXYGEN SATURATION: 98 % | RESPIRATION RATE: 18 BRPM | HEIGHT: 59 IN | BODY MASS INDEX: 19.76 KG/M2

## 2017-10-12 DIAGNOSIS — G40.909 SEIZURE DISORDER (H): Primary | ICD-10-CM

## 2017-10-12 DIAGNOSIS — I48.20 CHRONIC ATRIAL FIBRILLATION (H): ICD-10-CM

## 2017-10-12 DIAGNOSIS — I10 BENIGN ESSENTIAL HYPERTENSION: ICD-10-CM

## 2017-10-12 DIAGNOSIS — F03.90 SENILE DEMENTIA WITHOUT BEHAVIORAL DISTURBANCE (H): ICD-10-CM

## 2017-10-12 DIAGNOSIS — I73.9 PVD (PERIPHERAL VASCULAR DISEASE) (H): ICD-10-CM

## 2017-10-12 DIAGNOSIS — M15.0 PRIMARY OSTEOARTHRITIS INVOLVING MULTIPLE JOINTS: ICD-10-CM

## 2017-10-12 DIAGNOSIS — F32.5 MAJOR DEPRESSION IN COMPLETE REMISSION (H): ICD-10-CM

## 2017-10-12 DIAGNOSIS — R27.0 ATAXIA: ICD-10-CM

## 2017-10-12 NOTE — PROGRESS NOTES
[facility]  Chief Complaint   Patient presents with     Annual Comprehensive Exam Assisted Living     HPI:    eYni Pérez is a 89 year old  (2/9/1927), who is being seen today for an annual comprehensive visit at [facility].  HPI information obtained from: facility chart records, facility staff, patient report and [facility] chart review - Pt is unreliable historian due to dementia..  Today's concerns are:  Seizure disorder (H)  Remains on Keppra.  No obvious seizure activity.    Major depression in complete remission (H)  Tapered off ritalin.  No signs of depression.  PHQ-2 Score:     PHQ-2 ( 1999 [employer]) 10/12/2017   Q1: Little interest or pleasure in doing things 0   Q2: Feeling down, depressed or hopeless 0   PHQ-2 Score 0     Benign essential hypertension  Remains on toprol and ASA qd.  BP ranges in past month: 120-130/70.    Primary osteoarthritis involving multiple joints  Remains on glucosamine.  Pt denies pain.    PVD (peripheral vascular disease) (H)  No reports of pedal wounds.    Senile dementia without behavioral disturbance  Advancing cognitive loss.  Now has a daily private aide for assistance with ADL's.    Chronic atrial fibrillation (H)  No reports of heart rates >100/min.    Ataxia  Has altered gait with stooped posture due to kyphosis, but no reports of recent falls.  Has walker, but often forgets to use it.    ALLERGIES: Boniva [ibandronic acid]; Food; Lexapro [escitalopram]; and Zoloft [sertraline]  PROBLEM LIST:  Patient Active Problem List   Diagnosis     History of hepatitis A  in 1980     History of hepatitis B in 1980     Cyst on ear, left, 2/25/2013     H/O squamous cell carcinoma of skin, right hand, 11/28/2011     Ganglion cyst, 1st MTP left 3/25/2009     Seizure disorder (H)     H/O renal calculi, 1987     PVC's (premature ventricular contractions)     H/O small bowel obstruction, lysis of adhesions, 1970      Osteoporosis, Bone Density 2013: Femeral neck: -2.6 (L) and -2.3 (R)     Cardiomegaly     Diverticulosis of large intestine without hemorrhage     Bilateral dry eyes     Heart murmur     Ataxia     Chronic fatigue     Major depression in complete remission (H)     Benign essential hypertension     Primary osteoarthritis involving multiple joints     PVD (peripheral vascular disease) (H)     Abnormal mammogram (3./12/2015: 4mm grouping of calcificaions in the posterior central right breast)     Advance Care Planning     Senile dementia without behavioral disturbance     Low weight     External hemorrhoids     Chronic atrial fibrillation (H)     Dermatitis due to unknown cause, scalp     PAST MEDICAL HISTORY:  has a past medical history of Abnormal mammogram (3./12/2015: 4mm grouping of calcificaions in the posterior central right breast) (11/9/2015); Advanced directives, counseling/discussion, POLST completed 11/9/15, DNR/DNI (11/9/2015); Ataxia (11/9/2015); Benign essential hypertension (11/9/2015); Bilateral dry eyes (11/9/2015); Cardiomegaly (11/9/2015); Chronic atrial fibrillation (H) (4/10/2017); Chronic fatigue (11/9/2015); Cyst on ear, left, 2/25/2013 (11/9/2015); Dermatitis due to unknown cause, scalp (6/19/2017); Diverticulosis of large intestine without hemorrhage (11/9/2015); External hemorrhoids (11/14/2016); Ganglion cyst, 1st MTP left 3/25/2009 (11/9/2015); H/O renal calculi, 1987 (11/9/2015); H/O small bowel obstruction, lysis of adhesions, 1970 (11/9/2015); H/O squamous cell carcinoma of skin, right hand, 11/28/2011 (11/9/2015); Heart murmur (11/9/2015); History of hepatitis A  in 1980 (11/5/2015); History of hepatitis B in 1980 (11/5/2015); Low weight (12/4/2015); Major depression in complete remission (H) (11/9/2015); Osteoporosis, Bone Density 2013: Femeral neck: -2.6 (L) and -2.3 (R) (11/9/2015); Primary osteoarthritis involving multiple joints (11/9/2015); PVC's (premature ventricular  contractions) (11/9/2015); PVD (peripheral vascular disease) (H) (11/9/2015); Seizure disorder (H) (11/9/2015); Senile dementia without behavioral disturbance (12/4/2015); and Shingles.  PAST SURGICAL HISTORY:  has a past surgical history that includes appendectomy; Cystectomy ovarian benign; LAP,LYSIS OF ADHESIONS; Bunionectomy; and cataract iol, rt/lt.  FAMILY HISTORY: family history includes Colon Cancer in her mother; Depression in her daughter; Leukemia in her brother; Myocardial Infarction in her father; Ovarian Cancer in her sister.  SOCIAL HISTORY:  reports that she has quit smoking. She does not have any smokeless tobacco history on file. She reports that she drinks alcohol.  IMMUNIZATIONS:  Most Recent Immunizations   Administered Date(s) Administered     Influenza (IIV3) 10/24/2016     Pneumococcal (PCV 13) 12/02/2013     Pneumococcal 23 valent 03/08/2004     Tetanus 07/01/2014     Zoster vaccine, live 02/01/2007     Above immunizations pulled from [facility]. [facility] and facility records also reconciled.   Future immunizations needed:  TDAP, yearly influenza per facility protocol and TDAP to be given when pharmacy has supply.  MEDICATIONS:  Current Outpatient Prescriptions   Medication Sig Dispense Refill     docusate sodium (COLACE) 100 MG capsule Take 1 capsule (100 mg) by mouth every other day 60 capsule      emollient (VANICREAM) cream Apply topically 2 times daily       metoprolol (TOPROL-XL) 25 MG 24 hr tablet Take 1 tablet (25 mg) by mouth daily HS 30 tablet 1     Misc Natural Products (GLUCOSAMINE CHOND COMPLEX/MSM PO) Take 1 tablet by mouth daily       allopurinol (ZYLOPRIM) 300 MG tablet Take 300 mg by mouth daily       aspirin 325 MG EC tablet Take 325 mg by mouth daily       levETIRAcetam (KEPPRA) 500 MG tablet Take 500 mg by mouth daily       Medications reviewed:  Medications reconciled to facility chart and changes were made to reflect current medications as identified as above med list.  "Below are the changes that were made:   Medications stopped since last EPIC medication reconciliation:   Medications Discontinued During This Encounter   Medication Reason     triamcinolone (KENALOG) 0.1 % ointment Therapy completed     triamcinolone acetonide 0.05 % OINT Therapy completed       Medications started since last [facility] medication reconciliation:  No orders of the defined types were placed in this encounter.    Case Management:  I have reviewed the Assisted Living care plan, current immunizations and preventive care/cancer screening..Future cancer screening is not clinically indicated secondary to age/goals of care Patient lives in an assisted living community environment. . Current Level of Care is appropriate.    Advance Directive Discussion:    I reviewed the current advanced directives as reflected in EPIC, the POLST and the facility chart, and verified the congruency of orders . I contacted the first party, son Manolo. and left a message regarding the plan of Care.  I did not due to cognitive impairment review the advance directives with the resident.     Team Discussion:  I communicated with the appropriate disciplines involved with the Plan of Care:   Nursing:  Esther    Patient Goal:  Patient's goal is treatment for those issues that are treatable, but is DNR/DNI..    Information reviewed:  Medications, vital signs, orders, and nursing notes.    ROS:  Unobtainable secondary to cognitive impairment, but today pt reports no pain or distress    Exam:  /70  Pulse 81  Temp 97  F (36.1  C)  Resp 18  Ht 4' 11\" (1.499 m)  Wt 98 lb (44.5 kg)  SpO2 98%  BMI 19.79 kg/m2  GENERAL APPEARANCE: Alert, thin, cooperative, female seen. Able to respond verbally. Denies any acute distress. Pale facial color.    HEAD: Normocephalic. No facial asymmetry  ENT: Mouth and posterior oropharynx normal, moist mucous membranes, [place], Has her own teeth. Has left ear hearing aide in place.    EYES: EOM normal, " conjunctiva and lids normal, Wearing eyeglasses.  NECK:  Trachea midline.  NO palpable lymphadenopathy.  RESP: Quiet, effortless respirations. No cough. CTA bilaterally.  CV: Irregular rhythm, Grade 2/6 cardiac murmur. No LE edema. DP pulses +1 bilateral.  BREASTS: Small symmetrical breasts. NO palpable masses or axilla masses.  No dimpling or nipple discharge.  ABDOMEN: Soft thin abdomen. BS's positive all 4 quadrants. No tenderness with palpation. No guarding.   M/S: Slow gait with kyphotic posture.. No pain with ROM of extremities. Crepitus noted on both knees, (R>L)  NEURO: Oriented to person and loosely to place. No tremors or cogwheeling. Purposeful movement of all extremities without focal weakness. Frequent repetitive statements.   SKIN:  Callous right 1st metatarsal medial side,  Callous left outer ankle. Bruising on bilateral shins.  PSYCH: Mood was positive and bright.    Lab/Diagnostic data:    CBC RESULTS:   Recent Labs   Lab Test 08/24/17 04/10/17  08/01/16  02/13/15   WBC   --   7.1   --   5.7   < >  7.2   RBC   --   3.74   --   3.52   < >  3.76*   HGB  11.9  12.1   < >  11.7   < >  12.5   HCT   --   38.0   --   36.3   < >  38.5   MCV   --   101.6*   --   103.1   < >  102*   MCH   --    --    --    --    --   33.2*   MCHC   --    --    --    --    --   32.6   RDW   --   13.9   --   13.4   < >  13.8   PLT   --   261   --   226   < >  223    < > = values in this interval not displayed.       Last Basic Metabolic Panel:  Recent Labs   Lab Test 08/24/17 04/10/17   NA  145  141   POTASSIUM  4.4  4.0   CHLORIDE  109  105   BRIJESH  9.4  9.4   CO2  28  28   BUN  20  16   CR  0.85  0.73   GLC  95  100     GFR Estimate   Date Value Ref Range Status   08/24/2017 >60 >60 ml/min/1.73m2 Final   04/10/2017 >60 >60 ml/min/1.73m2 Final   02/20/2017 >60 >60 ml/min/1.73m2 Final     Liver Function Studies -   Recent Labs   Lab Test 10/17/16 02/13/15   PROTTOTAL  5.9  6.3   ALBUMIN  3.4  4.3   BILITOTAL  1.4  1.1   ALKPHOS   68  72   AST  21  23   ALT  13  16       ASSESSMENT/PLAN  (G40.909) Seizure disorder (H)  (primary encounter diagnosis)  Comment: No obvious seizures  Plan: Continue current dose of Keppra and monitor.    (F32.5) Major depression in complete remission (H)  Comment: Depression screen done: PHQ-2 Given screen score and clinical assessment patient is stable without any signs of depression and no futher interventions warrented at this time.  Plan:  Monitor    (I10) Benign essential hypertension  Comment: Based on JNC-8 goals,  patients age of 89 year old, no presence of diabetes or CKD, and goals of care goal BP is <150/90 mm Hg. patient is stable   Plan:  Continue with current plan of care and routine assessment..Check Hgb and BMP at next lab day.     (M15.0) Primary osteoarthritis involving multiple joints  Comment: Chronic  Plan: Continue current POC.    (I73.9) PVD (peripheral vascular disease) (H)  Comment: Chronic  Plan: Monitor for pedal wounds.  Continue aSA.    (F03.90) Senile dementia without behavioral disturbance  Comment: Advancing  Plan: Continue to provide assist with ADL's.  Anticipate further progression of disease and will need more advanced care.  Monitor.    (I48.2) Chronic atrial fibrillation (H)  Comment: Chronic  Plan: Continue metoprolol for rate control and ASA for anticoagulation.  Monitor.    (R27.0) Ataxia  Comment: Chronic  Plan: Continue to remind her to use walker.  Monitor.    Electronically signed by:  RAFAEL Flowers CNP

## 2017-11-02 RX ORDER — CALCIUM CARBONATE 500(1250)
1 TABLET ORAL 4 TIMES DAILY
COMMUNITY
End: 2017-11-03

## 2017-11-02 RX ORDER — BISACODYL 10 MG
10 SUPPOSITORY, RECTAL RECTAL
COMMUNITY
End: 2018-05-09

## 2017-11-02 RX ORDER — CALCIUM CARBONATE 1250 MG/5ML
1250 SUSPENSION ORAL 4 TIMES DAILY PRN
COMMUNITY
End: 2017-11-03

## 2017-11-02 RX ORDER — CALCIUM CARBONATE 500(1250)
1 TABLET ORAL 4 TIMES DAILY
COMMUNITY
End: 2017-11-02

## 2017-11-02 RX ORDER — AMOXICILLIN 250 MG
2 CAPSULE ORAL AT BEDTIME
COMMUNITY
End: 2019-01-01

## 2017-11-02 NOTE — PROGRESS NOTES
[place] [facility]  PRIMARY CARE PROVIDER AND CLINIC:  Cassie Gonzalez [address] / [place] [address]  Chief Complaint   Patient presents with     Hospital F/U       HPI:    Yeni Pérez is a 89 year old  (2/9/1927),admitted to the [facility] from [facility].  Hospital stay 10/30/2017 through 11/2/2017.  Admitted to this facility for  rehab, medical management and nursing care.  HPI information obtained from: facility chart records, facility staff, patient report, [facility] chart review and Care Everywhere [facility] chart review.  Current issues are:      Dislocated shoulder, right, sequela  Pt was found on 10/30 in her bed with severe right shoulder pain.  Pt has dementia and has no ability to recall any injury, but also has bruising on right knee and right hip. 911 called and was taken to [facility] due to no recall of fall.  Found to have dislocated shoulder and was put back into place and pt was kept until 11/2 in observation.  Now in TCU   Nsg reports pt was up all night calling out due to both pain and confusion and had taken off her immobilizer. Given tylenol at 1 and again at 5:30.  Now continues to c/o pain in right shoulder and has no recall of injury or hospitalization due to her dementia.    Right knee injury, subsequent encounter  Bruising noted on right knee.  No reports of pain.    Hip injury, right, sequela  Bruising noted on right hip.  No reports of pain.    Vasovagal syncope  On toilet to have BM this morning and had a brief episode of minimal level of alertness and some involuntary movements.  Returned quickly to baseline cognitive status. Nsg checked VS's and pts BP was 96/58.    Chronic atrial fibrillation (H)  Found to have irregularly irregular heart rhythm with rate in the 90's.  On metoprolol and ASA.    Dementia with behavioral disturbance, unspecified dementia type  Dementia has been advancing in her apartment and family had hired  additional private help, but did not have 24 hour assistance. Would often refuse assistance with cares and lacked insight into cognitive and physical issues. Currently confused, but seems to be at baseline, with very poor short term memory.      CODE STATUS/ADVANCE DIRECTIVES DISCUSSION:   DNR / DNI  Patient's living condition: lives in an assisted living facility    ALLERGIES:Boniva [ibandronic acid]; Food; Lexapro [escitalopram]; No clinical screening - see comments; and Zoloft [sertraline]  PAST MEDICAL HISTORY:  has a past medical history of Abnormal mammogram (3./12/2015: 4mm grouping of calcificaions in the posterior central right breast) (11/9/2015); Advanced directives, counseling/discussion, POLST completed 11/9/15, DNR/DNI (11/9/2015); Ataxia (11/9/2015); Benign essential hypertension (11/9/2015); Bilateral dry eyes (11/9/2015); Cardiomegaly (11/9/2015); Chronic atrial fibrillation (H) (4/10/2017); Chronic fatigue (11/9/2015); Cyst on ear, left, 2/25/2013 (11/9/2015); Dermatitis due to unknown cause, scalp (6/19/2017); Dislocated shoulder, right, sequela (11/3/2017); Diverticulosis of large intestine without hemorrhage (11/9/2015); External hemorrhoids (11/14/2016); Ganglion cyst, 1st MTP left 3/25/2009 (11/9/2015); H/O renal calculi, 1987 (11/9/2015); H/O small bowel obstruction, lysis of adhesions, 1970 (11/9/2015); H/O squamous cell carcinoma of skin, right hand, 11/28/2011 (11/9/2015); Heart murmur (11/9/2015); History of hepatitis A  in 1980 (11/5/2015); History of hepatitis B in 1980 (11/5/2015); Low weight (12/4/2015); Major depression in complete remission (H) (11/9/2015); Osteoporosis, Bone Density 2013: Femeral neck: -2.6 (L) and -2.3 (R) (11/9/2015); Primary osteoarthritis involving multiple joints (11/9/2015); PVC's (premature ventricular contractions) (11/9/2015); PVD (peripheral vascular disease) (H) (11/9/2015); Seizure disorder (H) (11/9/2015); Senile dementia without behavioral disturbance  (12/4/2015); and Shingles.  PAST SURGICAL HISTORY:  has a past surgical history that includes appendectomy; Cystectomy ovarian benign; LAP,LYSIS OF ADHESIONS; Bunionectomy; and cataract iol, rt/lt.  FAMILY HISTORY: family history includes Colon Cancer in her mother; Depression in her daughter; Leukemia in her brother; Myocardial Infarction in her father; Ovarian Cancer in her sister.  SOCIAL HISTORY:  reports that she has quit smoking. She does not have any smokeless tobacco history on file. She reports that she drinks alcohol.    Post Discharge Medication Reconciliation Status: discharge medications reconciled and changed, per note/orders (see AVS).  Current Outpatient Prescriptions   Medication Sig Dispense Refill     calcium carbonate (TUMS) 500 MG chewable tablet Take 1 tablet (500 mg) by mouth 4 times daily as needed for heartburn 150 tablet      ACETAMINOPHEN PO Take 650 mg by mouth every 4 hours as needed for pain        bisacodyl (DULCOLAX) 10 MG Suppository Place 10 mg rectally every 3 days       magnesium hydroxide (MILK OF MAGNESIA) 400 MG/5ML suspension Take 5 mLs by mouth daily as needed for constipation or heartburn       senna-docusate (SENOKOT-S;PERICOLACE) 8.6-50 MG per tablet Take 2 tablets by mouth daily as needed for constipation       docusate sodium (COLACE) 100 MG capsule Take 1 capsule (100 mg) by mouth every other day 60 capsule      emollient (VANICREAM) cream Apply topically 2 times daily       metoprolol (TOPROL-XL) 25 MG 24 hr tablet Take 1 tablet (25 mg) by mouth daily HS 30 tablet 1     Misc Natural Products (GLUCOSAMINE CHOND COMPLEX/MSM PO) Take 1 tablet by mouth daily       allopurinol (ZYLOPRIM) 300 MG tablet Take 300 mg by mouth daily       aspirin 325 MG EC tablet Take 325 mg by mouth daily       levETIRAcetam (KEPPRA) 500 MG tablet Take 500 mg by mouth daily       Patient Active Problem List   Diagnosis     History of hepatitis A  in 1980     History of hepatitis B in 1980      "Cyst on ear, left, 2/25/2013     H/O squamous cell carcinoma of skin, right hand, 11/28/2011     Ganglion cyst, 1st MTP left 3/25/2009     Seizure disorder (H)     H/O renal calculi, 1987     PVC's (premature ventricular contractions)     H/O small bowel obstruction, lysis of adhesions, 1970     Osteoporosis, Bone Density 2013: Femeral neck: -2.6 (L) and -2.3 (R)     Cardiomegaly     Diverticulosis of large intestine without hemorrhage     Bilateral dry eyes     Heart murmur     Ataxia     Chronic fatigue     Major depression in complete remission (H)     Benign essential hypertension     Primary osteoarthritis involving multiple joints     PVD (peripheral vascular disease) (H)     Abnormal mammogram (3./12/2015: 4mm grouping of calcificaions in the posterior central right breast)     Advance Care Planning     Senile dementia without behavioral disturbance     Low weight     External hemorrhoids     Chronic atrial fibrillation (H)     Dermatitis due to unknown cause, scalp     Dislocated shoulder, right, sequela       ROS:  Unobtainable secondary to cognitive impairment, but today pt reports pain in her right shoulder and that \"I don't feel well\".    Exam:  BP 96/58  Pulse 92  Temp 97.7  F (36.5  C)  Resp 18  Ht 5' 0.98\" (1.549 m)  Wt 108 lb (49 kg)  SpO2 96%  BMI 20.42 kg/m2     GENERAL APPEARANCE: Alert, thin, female seen.  Pale and confused. Able to respond verbally. Appears tired & uncomfortable.. Seen with Physical Therapy .    HEAD: Normocephalic. No facial asymmetry  ENT: Mouth and posterior oropharynx normal, moist mucous membranes, [place], Has her own teeth. Has right ear hearing aide in place.    EYES: EOM normal, conjunctiva and lids normal, Wearing eyeglasses.  RESP: Quiet, effortless respirations. No cough. CTA bilaterally.  CV: Irregular rhythm, Grade 2/6 cardiac murmur. No LE edema. DP pulses +1 bilateral.  ABDOMEN: Soft thin abdomen. BS's positive all 4 quadrants. No tenderness with palpation. No " guarding.   M/S: In immobilizer of right arm. Carefully removed.  Fading bruising noted near anterior axilla as well as posterior shoulder.  Tender to any touch of anterior or posterior shoulder.  Immobilizer replaced and pt stood with assistance.  Right hip has fading bruising distal to hip.  No tenderness with palpation.  Right knee has diffuse ecchymosis.  No pain with ROM.  Pain to touch of hematoma below right knee.   NEURO: Oriented to person only.  Numerous repetitive statements about, wanting to know where she was and why her shoulder hurt. Able to squeeze both hands firmly, without pain.  Observed moving right forearm and elbow without pain.  No tremors or cogwheeling.   SKIN:  Callous right 1st metatarsal medial side,  Callous left outer ankle. .  PSYCH:  COnfused and not understanding why she was in pain..    ADDENDUM:  Seen briefly following unresponsive episode with involuntary movements while sitting on toilet.  Had returned to baseline orientation and no further involuntary movements. Very pale facial color.    Lab/Diagnostic data: (Per [facility])  CBC WITH PLATELET (10/30/2017 12:30 PM)  CBC WITH PLATELET (10/30/2017 12:30 PM)   Component Value Ref Range   WBC 11.49 (H) 4.00 - 10.00 k/cmm   RBC 3.50 (L) 3.90 - 5.20 m/cmm   Hgb 12.0 11.5 - 15.7 g/dL   Hematocrit 35.5 34.0 - 45.0 %   .4 (H) 80.0 - 100.0 fL   MCH 34.3 (H) 25.0 - 32.0 pg   MCHC 33.8 31.0 - 36.0 g/dL   RDW 13.3 11.5 - 14.5 %   Plt 179 150 - 400 k/cmm   MPV 11.5 6.5 - 12.5 fL   NRBC 0.0 0.0 - 0.0 %   CBC Plt Performed at: [facility]  Comment:   [facility]  [address] [place] [id number]           ED CHEMISTRY LABS(NA,K,CL,CO2,GLU,CREAT,CA-IONIZED,ANION GAP) (10/30/2017 12:30 PM)  ED CHEMISTRY LABS(NA,K,CL,CO2,GLU,CREAT,CA-IONIZED,ANION GAP) (10/30/2017 12:30 PM)   Component Value Ref Range   Sodium 140 135 - 148 mEq/L   Chloride 104 92 - 108 mEq/L   CO2 28 22 - 30 mEq/L   AnGap 9 8 - 16 mEq/L   Glucose 144 (H) 70 - 100 mg/dL    ICA, Actual 4.50 4.40 - 5.20 mg/dL   ICA, pH Corrected 4.60 4.40 - 5.20 mg/dL   Creatinine 0.65 0.50 - 1.00 mg/dL   GFR, African American 104 >=60 ml/min/1.73m2   GFR, Non- 86 >=60 ml/min/1.73m2   Potassium 3.5 3.5 - 5.3 mEq/L     Family Communication:  Discussed situation with son Manolo and daughter Chika.  Each informed of level of pain and ongoing confusion.  Discussed plan to have orthopedic PA see her today and XRays'.  Also discussed her safety issues in her apartment due to her cognitive loss and that she will likely need 24 hour care. Both expressed understanding and consent.    ASSESSMENT/PLAN:  (S43.004S) Dislocated shoulder, right, sequela  (primary encounter diagnosis)  Comment: Ongoing pain  Plan: STAT XRay right shoulder.  ALEN Long PAC to see today.  Continue with immobilizer.  Pt is permitted and encouraged to move elbow and use hand but avoid any abduction of shoulder. Schedule tylenol q6h and start prn Tramadol for pain.    (S89.91XD) Right knee injury, subsequent encounter  Comment: Resolving ecchymosis  Plan: Monitor for signs of pain.    (S79.911S) Hip injury, right, sequela  Comment: Resolving ecchymosis  Plan: Monitor for signs of pain.    (R55) Vasovagal syncope  Comment: Likely secondary to having a BM vs seizure  Plan:  Change metoprolol to 12.5 mg tartrate bid with parameters for metoprolol for holding if SBP <100 or pulse <60.  Continue Keppra and monitor.    (I48.2) Chronic atrial fibrillation (H)  Comment: Chronic  Plan: EKG today.  Change parameters for metoprolol as noted above.    (F03.91) Dementia with behavioral disturbance, unspecified dementia type  Comment: Advancing  Plan: Continue care in TCU, but pt will likely not be able to return to her apartment unless family can afford 24 hour care.  , therapy and nsg to discuss with family.    Electronically signed by:  RAFAEL Flowers CNP

## 2017-11-03 ENCOUNTER — TRANSFERRED RECORDS (OUTPATIENT)
Dept: HEALTH INFORMATION MANAGEMENT | Facility: CLINIC | Age: 82
End: 2017-11-03

## 2017-11-03 ENCOUNTER — NURSING HOME VISIT (OUTPATIENT)
Dept: GERIATRICS | Facility: CLINIC | Age: 82
End: 2017-11-03
Payer: MEDICARE

## 2017-11-03 ENCOUNTER — NURSING HOME VISIT (OUTPATIENT)
Dept: GERIATRICS | Facility: CLINIC | Age: 82
End: 2017-11-03

## 2017-11-03 VITALS
BODY MASS INDEX: 20.39 KG/M2 | WEIGHT: 108 LBS | OXYGEN SATURATION: 96 % | TEMPERATURE: 97.7 F | DIASTOLIC BLOOD PRESSURE: 58 MMHG | HEART RATE: 92 BPM | RESPIRATION RATE: 18 BRPM | SYSTOLIC BLOOD PRESSURE: 96 MMHG | HEIGHT: 61 IN

## 2017-11-03 DIAGNOSIS — S43.004S DISLOCATED SHOULDER, RIGHT, SEQUELA: Primary | ICD-10-CM

## 2017-11-03 DIAGNOSIS — S89.91XD RIGHT KNEE INJURY, SUBSEQUENT ENCOUNTER: ICD-10-CM

## 2017-11-03 DIAGNOSIS — R55 VASOVAGAL SYNCOPE: ICD-10-CM

## 2017-11-03 DIAGNOSIS — I48.20 CHRONIC ATRIAL FIBRILLATION (H): ICD-10-CM

## 2017-11-03 DIAGNOSIS — S79.911S HIP INJURY, RIGHT, SEQUELA: ICD-10-CM

## 2017-11-03 DIAGNOSIS — F03.91 DEMENTIA WITH BEHAVIORAL DISTURBANCE, UNSPECIFIED DEMENTIA TYPE: ICD-10-CM

## 2017-11-03 LAB
BUN SERPL-MCNC: 32 MG/DL (ref 9–26)
CALCIUM SERPL-MCNC: 9.6 MG/DL (ref 8.4–10.2)
CHLORIDE SERPLBLD-SCNC: 104 MMOL/L (ref 98–109)
CO2 SERPL-SCNC: 26 MMOL/L (ref 22–31)
CREAT SERPL-MCNC: 0.94 MG/DL (ref 0.55–1.02)
DIFFERENTIAL: ABNORMAL
ERYTHROCYTE [DISTWIDTH] IN BLOOD BY AUTOMATED COUNT: 13.4 % (ref 11–15)
GFR SERPL CREATININE-BSD FRML MDRD: 53 ML/MIN/1.73M2
GLUCOSE SERPL-MCNC: 191 MG/DL (ref 70–100)
HCT VFR BLD AUTO: 36.4 % (ref 35–46)
HEMOGLOBIN: 11.8 G/DL (ref 11.8–15.5)
MCV RBC AUTO: 104.3 FL (ref 80–100)
PLATELET # BLD AUTO: 243 K/CMM (ref 140–450)
POTASSIUM SERPL-SCNC: 4.8 MMOL/L (ref 3.5–5.2)
RBC # BLD AUTO: 3.49 M/CMM (ref 3.7–5.2)
SODIUM SERPL-SCNC: 142 MMOL/L (ref 136–145)
WBC # BLD AUTO: 11.2 K/CMM (ref 3.8–11)

## 2017-11-03 PROCEDURE — 99310 SBSQ NF CARE HIGH MDM 45: CPT | Performed by: NURSE PRACTITIONER

## 2017-11-03 RX ORDER — CALCIUM CARBONATE 500 MG/1
1 TABLET, CHEWABLE ORAL 4 TIMES DAILY PRN
Qty: 150 TABLET
Start: 2017-11-03 | End: 2017-11-27

## 2017-11-03 RX ORDER — TRAMADOL HYDROCHLORIDE 50 MG/1
25 TABLET ORAL EVERY 6 HOURS PRN
Qty: 20 TABLET | Refills: 0
Start: 2017-11-03 | End: 2018-02-13

## 2017-11-03 RX ORDER — ACETAMINOPHEN 325 MG/1
650 TABLET ORAL EVERY 6 HOURS
Qty: 100 TABLET
Start: 2017-11-03 | End: 2017-11-20

## 2017-11-03 RX ORDER — METOPROLOL TARTRATE 25 MG/1
12.5 TABLET, FILM COATED ORAL 2 TIMES DAILY
Qty: 180 TABLET | Refills: 3
Start: 2017-11-03 | End: 2017-11-20

## 2017-11-03 NOTE — MR AVS SNAPSHOT
"              After Visit Summary   11/3/2017    Yeni Pérez    MRN: [id number]           Patient Information     Date Of Birth          1927        Visit Information        Provider Department      11/3/2017 11:46 AM ANTONIA Long PA-C [facility]        Today's Diagnoses     Dislocated shoulder, right, sequela    -  1       Follow-ups after your visit        Who to contact     If you have questions or need follow up information about today's clinic visit or your schedule please contact [facility] directly at [phone].  Normal or non-critical lab and imaging results will be communicated to you by Portalariumhart, letter or phone within 4 business days after the clinic has received the results. If you do not hear from us within 7 days, please contact the clinic through MobileDevHQt or phone. If you have a critical or abnormal lab result, we will notify you by phone as soon as possible.  Submit refill requests through Pluralsight or call your pharmacy and they will forward the refill request to us. Please allow 3 business days for your refill to be completed.          Additional Information About Your Visit        MyChart Information     Pluralsight lets you send messages to your doctor, view your test results, renew your prescriptions, schedule appointments and more. To sign up, go to [email or web address] . Click on \"Log in\" on the left side of the screen, which will take you to the Welcome page. Then click on \"Sign up Now\" on the right side of the page.     You will be asked to enter the access code listed below, as well as some personal information. Please follow the directions to create your username and password.     Your access code is: [email or web address]  Expires: 2018 11:51 AM     Your access code will  in 90 days. If you need help or a new code, please call your [facility] or [phone].        Care EveryWhere ID     This is your Care EveryWhere ID. This could be " used by other organizations to access your [facility] medical records  [id number]         Blood Pressure from Last 3 Encounters:   11/03/17 96/58   10/12/17 132/70   08/17/17 128/60    Weight from Last 3 Encounters:   11/02/17 108 lb (49 kg)   10/12/17 98 lb (44.5 kg)   08/17/17 108 lb (49 kg)              Today, you had the following     No orders found for display         Today's Medication Changes          These changes are accurate as of: 11/3/17 11:51 AM.  If you have any questions, ask your nurse or doctor.               Start taking these medicines.        Dose/Directions    calcium carbonate 500 MG chewable tablet   Commonly known as:  TUMS   Replaces:  calcium carbonate 1250 MG/5ML Susp suspension   Started by:  Cassie Gonzalez APRN CNP        Dose:  1 chew tab   Take 1 tablet (500 mg) by mouth 4 times daily as needed for heartburn   Quantity:  150 tablet   Refills:  0       metoprolol 25 MG tablet   Commonly known as:  LOPRESSOR   Used for:  Chronic atrial fibrillation (H)   Started by:  Cassie Gonzalez APRN CNP        Dose:  12.5 mg   Take 0.5 tablets (12.5 mg) by mouth 2 times daily Hold for systolic BP <100 or pulse <60   Quantity:  180 tablet   Refills:  3       traMADol 50 MG tablet   Commonly known as:  ULTRAM   Used for:  Dislocated shoulder, right, sequela   Started by:  Cassie Gonzalez APRN CNP        Dose:  25 mg   Take 0.5 tablets (25 mg) by mouth every 6 hours as needed for moderate pain   Quantity:  20 tablet   Refills:  0         These medicines have changed or have updated prescriptions.        Dose/Directions    acetaminophen 325 MG tablet   Commonly known as:  TYLENOL   This may have changed:    - medication strength  - when to take this  - reasons to take this   Used for:  Dislocated shoulder, right, sequela   Changed by:  Cassie Gonzalez APRN CNP        Dose:  650 mg   Take 2 tablets (650 mg) by mouth every 6 hours   Quantity:  100 tablet   Refills:  0          Stop taking these medicines if you haven't already. Please contact your care team if you have questions.     calcium carbonate 1250 MG tablet   Commonly known as:  OS-BRIJESH 500 mg "[employer]. [place]   Stopped by:  Cassie Gonzalez APRN CNP           calcium carbonate 1250 MG/5ML Susp suspension   Replaced by:  calcium carbonate 500 MG chewable tablet   Stopped by:  Cassie Gonzalez APRN CNP           metoprolol 25 MG 24 hr tablet   Commonly known as:  TOPROL-XL   Stopped by:  Cassie Gonzalez APRN CNP                Where to get your medicines      Some of these will need a paper prescription and others can be bought over the counter.  Ask your nurse if you have questions.     You don't need a prescription for these medications     acetaminophen 325 MG tablet    calcium carbonate 500 MG chewable tablet    metoprolol 25 MG tablet    traMADol 50 MG tablet                Primary Care Provider Office Phone # Fax #    RAFAEL Flowers [phone]       [address] [facility] [address]  [facility] [address]        Equal Access to Services     [facility]: Hadii aad ku hadasho Soomaali, waaxda luqadaha, qaybta kaalmada adeegyada, waxay idiin hayhoang cloud . So [facility] [phone].    ATENCIÓN: Si habla español, tiene a randhawa disposición servicios gratuitos de asistencia lingüística. [facility] [phone].    We comply with applicable federal civil rights laws and [place] laws. We do not discriminate on the basis of race, color, national origin, age, disability, sex, sexual orientation, or gender identity.            Thank you!     Thank you for choosing [facility]  for your care. Our goal is always to provide you with excellent care. Hearing back from our patients is one way we can continue to improve our services. Please take a few minutes to complete the written survey that you may receive in the mail after your visit with us. Thank you!             Your Updated  Medication List - Protect others around you: Learn how to safely use, store and throw away your medicines at www.disposemymeds.org.          This list is accurate as of: 11/3/17 11:51 AM.  Always use your most recent med list.                   Brand Name Dispense Instructions for use Diagnosis    acetaminophen 325 MG tablet    TYLENOL    100 tablet    Take 2 tablets (650 mg) by mouth every 6 hours    Dislocated shoulder, right, sequela       allopurinol 300 MG tablet    ZYLOPRIM     Take 300 mg by mouth daily        aspirin 325 MG EC tablet      Take 325 mg by mouth daily        bisacodyl 10 MG Suppository    DULCOLAX     Place 10 mg rectally every 3 days        calcium carbonate 500 MG chewable tablet    TUMS    150 tablet    Take 1 tablet (500 mg) by mouth 4 times daily as needed for heartburn        docusate sodium 100 MG capsule    COLACE    60 capsule    Take 1 capsule (100 mg) by mouth every other day    Slow transit constipation       emollient cream      Apply topically 2 times daily        GLUCOSAMINE CHOND COMPLEX/MSM PO      Take 1 tablet by mouth daily        KEPPRA 500 MG tablet   Generic drug:  levETIRAcetam      Take 500 mg by mouth daily        magnesium hydroxide 400 MG/5ML suspension    MILK OF MAGNESIA     Take 5 mLs by mouth daily as needed for constipation or heartburn        metoprolol 25 MG tablet    LOPRESSOR    180 tablet    Take 0.5 tablets (12.5 mg) by mouth 2 times daily Hold for systolic BP <100 or pulse <60    Chronic atrial fibrillation (H)       senna-docusate 8.6-50 MG per tablet    SENOKOT-S;PERICOLACE     Take 2 tablets by mouth daily as needed for constipation        traMADol 50 MG tablet    ULTRAM    20 tablet    Take 0.5 tablets (25 mg) by mouth every 6 hours as needed for moderate pain    Dislocated shoulder, right, sequela

## 2017-11-03 NOTE — PROGRESS NOTES
Ortho Nursing home visit    Yeni Pérez is a 89 year old female who resides at [facility]  Patient is seen today for Right shoulder dislocation/ F/U , was seen at [facility] and reduced in the ED; had shoulder immobilizer applied, but continues to have pain noted from staff;      Past Medical History:   Diagnosis Date     Abnormal mammogram (3./12/2015: 4mm grouping of calcificaions in the posterior central right breast) 11/9/2015     Advanced directives, counseling/discussion, POLST completed 11/9/15, DNR/DNI 11/9/2015     Ataxia 11/9/2015     Benign essential hypertension 11/9/2015     Bilateral dry eyes 11/9/2015     Cardiomegaly 11/9/2015     Chronic atrial fibrillation (H) 4/10/2017     Chronic fatigue 11/9/2015     Cyst on ear, left, 2/25/2013 11/9/2015     Dermatitis due to unknown cause, scalp 6/19/2017     Dislocated shoulder, right, sequela 11/3/2017     Diverticulosis of large intestine without hemorrhage 11/9/2015     External hemorrhoids 11/14/2016     Ganglion cyst, 1st MTP left 3/25/2009 11/9/2015     H/O renal calculi, 1987 11/9/2015     H/O small bowel obstruction, lysis of adhesions, 1970 11/9/2015     H/O squamous cell carcinoma of skin, right hand, 11/28/2011 11/9/2015     Heart murmur 11/9/2015     History of hepatitis A  in 1980 11/5/2015     History of hepatitis B in 1980 11/5/2015     Low weight 12/4/2015     Major depression in complete remission (H) 11/9/2015     Osteoporosis, Bone Density 2013: Femeral neck: -2.6 (L) and -2.3 (R) 11/9/2015     Primary osteoarthritis involving multiple joints 11/9/2015     PVC's (premature ventricular contractions) 11/9/2015     PVD (peripheral vascular disease) (H) 11/9/2015     Seizure disorder (H) 11/9/2015     Senile dementia without behavioral disturbance 12/4/2015     Shingles     11/4/2008      Past Surgical History:   Procedure Laterality Date     APPENDECTOMY      1954     BUNIONECTOMY      2003 - Right foot     CATARACT IOL, RT/LT       bilateral     CYSTECTOMY OVARIAN BENIGN      1954     HC LAP,LYSIS OF ADHESIONS      1970 - bowel obstruction        Allergies   Allergen Reactions     Boniva [Ibandronic Acid]      Food      PINEAPPLE     Lexapro [Escitalopram]      No Clinical Screening - See Comments      Hexacycline     Zoloft [Sertraline]       There were no vitals taken for this visit.     Exam: Seated in W/C. Immobilizer removed, on Right UE; allows hand to mouth activity, FE/ER/IR with some noted C/O pain, no deformity noted;      X-rays show : Anatomic alignment of proximal humerus and glenoid; no dislocation     ASSESSMENT / PLAN:  Continue with immobilizer for 7 days then resume activity as tolerated; WBAT now with walker, hand to mouth activity;    [id number]          Chaz OPA-C with Reshma Gonzalez NP  [phone]

## 2017-11-06 ENCOUNTER — TRANSFERRED RECORDS (OUTPATIENT)
Dept: HEALTH INFORMATION MANAGEMENT | Facility: CLINIC | Age: 82
End: 2017-11-06

## 2017-11-06 ENCOUNTER — NURSING HOME VISIT (OUTPATIENT)
Dept: GERIATRICS | Facility: CLINIC | Age: 82
End: 2017-11-06
Payer: MEDICARE

## 2017-11-06 VITALS
BODY MASS INDEX: 20.42 KG/M2 | WEIGHT: 108 LBS | HEART RATE: 75 BPM | TEMPERATURE: 97.8 F | RESPIRATION RATE: 20 BRPM | DIASTOLIC BLOOD PRESSURE: 68 MMHG | SYSTOLIC BLOOD PRESSURE: 102 MMHG

## 2017-11-06 DIAGNOSIS — F03.90 SENILE DEMENTIA WITHOUT BEHAVIORAL DISTURBANCE (H): ICD-10-CM

## 2017-11-06 DIAGNOSIS — S43.004S DISLOCATED SHOULDER, RIGHT, SEQUELA: Primary | ICD-10-CM

## 2017-11-06 DIAGNOSIS — I48.20 CHRONIC ATRIAL FIBRILLATION (H): ICD-10-CM

## 2017-11-06 LAB
BUN SERPL-MCNC: 18 MG/DL (ref 9–26)
CALCIUM SERPL-MCNC: 9.4 MG/DL (ref 8.4–10.2)
CHLORIDE SERPLBLD-SCNC: 105 MMOL/L (ref 98–109)
CO2 SERPL-SCNC: 27 MMOL/L (ref 22–31)
CREAT SERPL-MCNC: 0.59 MG/DL (ref 0.55–1.02)
DIFFERENTIAL: ABNORMAL
ERYTHROCYTE [DISTWIDTH] IN BLOOD BY AUTOMATED COUNT: 13.2 % (ref 11–15)
GFR SERPL CREATININE-BSD FRML MDRD: >60 ML/MIN/1.73M2
GLUCOSE SERPL-MCNC: 117 MG/DL (ref 70–100)
HCT VFR BLD AUTO: 37.3 % (ref 35–46)
HEMOGLOBIN: 11.9 G/DL (ref 11.8–15.5)
MCV RBC AUTO: 103.9 FL (ref 80–100)
PLATELET # BLD AUTO: 342 K/CMM (ref 140–450)
POTASSIUM SERPL-SCNC: 4 MMOL/L (ref 3.5–5.2)
RBC # BLD AUTO: 3.59 M/CMM (ref 3.7–5.2)
SODIUM SERPL-SCNC: 142 MMOL/L (ref 136–145)
WBC # BLD AUTO: 7.4 K/CMM (ref 3.8–11)

## 2017-11-06 PROCEDURE — 99309 SBSQ NF CARE MODERATE MDM 30: CPT | Performed by: NURSE PRACTITIONER

## 2017-11-13 ENCOUNTER — NURSING HOME VISIT (OUTPATIENT)
Dept: GERIATRICS | Facility: CLINIC | Age: 82
End: 2017-11-13

## 2017-11-13 DIAGNOSIS — R55 VASOVAGAL SYNCOPE: ICD-10-CM

## 2017-11-13 DIAGNOSIS — S43.004S DISLOCATED SHOULDER, RIGHT, SEQUELA: Primary | ICD-10-CM

## 2017-11-13 DIAGNOSIS — F03.91 DEMENTIA WITH BEHAVIORAL DISTURBANCE, UNSPECIFIED DEMENTIA TYPE: ICD-10-CM

## 2017-11-13 DIAGNOSIS — I48.20 CHRONIC ATRIAL FIBRILLATION (H): ICD-10-CM

## 2017-11-15 NOTE — PROGRESS NOTES
Pt was seen for a TCU visit  Case reviewed with NP    Pt suffered a R shoulder dislocation; the circumstances of injury not clear  She has experienced increased confusion since admission to TCU  She had one episode of decreased alertness last week while having a BM. There has been no recurrence    Pt currently denies any specific physical concerns      VSS  Alert, in NAD  Oriented to person, calm  R arm in sling  R hand without edema, hand grasp nl  Bruising R arm  Lungs clear  CV irreg, HR 80s    Assessment    S/p R shoulder dislocation, stable, Circumstances of injury not clear    Dementia, with element of delirium secondary to injury, pain, TCU placement, stable    Prob vasovagal episode, no recurrence    Chronic afib    Seizure, d/o, appears stable, though conceivably, shoulder injury may have been associated with an (unwitnessed) seizure    Plan  PT/OT  Monitor mental status, vitals closely  Monitor for evidence of recurrent seizures

## 2017-11-20 ENCOUNTER — NURSING HOME VISIT (OUTPATIENT)
Dept: GERIATRICS | Facility: CLINIC | Age: 82
End: 2017-11-20
Payer: MEDICARE

## 2017-11-20 VITALS
HEIGHT: 61 IN | HEART RATE: 76 BPM | WEIGHT: 117 LBS | BODY MASS INDEX: 22.09 KG/M2 | TEMPERATURE: 98.2 F | RESPIRATION RATE: 16 BRPM | SYSTOLIC BLOOD PRESSURE: 110 MMHG | DIASTOLIC BLOOD PRESSURE: 71 MMHG

## 2017-11-20 DIAGNOSIS — I48.20 CHRONIC ATRIAL FIBRILLATION (H): ICD-10-CM

## 2017-11-20 DIAGNOSIS — F03.90 SENILE DEMENTIA WITHOUT BEHAVIORAL DISTURBANCE (H): ICD-10-CM

## 2017-11-20 DIAGNOSIS — S43.004S DISLOCATED SHOULDER, RIGHT, SEQUELA: Primary | ICD-10-CM

## 2017-11-20 DIAGNOSIS — K59.01 SLOW TRANSIT CONSTIPATION: ICD-10-CM

## 2017-11-20 DIAGNOSIS — R63.6 LOW WEIGHT: ICD-10-CM

## 2017-11-20 PROCEDURE — 99309 SBSQ NF CARE MODERATE MDM 30: CPT | Performed by: NURSE PRACTITIONER

## 2017-11-20 NOTE — PROGRESS NOTES
[facility]    Chief Complaint   Patient presents with     RECHECK       HPI:    Yeni Pérez is a 89 year old  (2/9/1927), who is being seen today for an episodic care visit at [facility].  HPI information obtained from: facility chart records, facility staff, patient report and [facility] chart review.Pt is unreliable historian due to cognitive loss. Today's concern is:  Dislocated shoulder, right, sequela  Pain is controlled mostly with tid tylenol. Has an occasional prn tramadol for breakthrough pain.  Last dose was 48 hours ago.  Remains in Physical Therapy and Occupational Therapy.  Remains in arm imobilizer.  Ortho has noted that it is OK to remove and monitor for pain.    Chronic atrial fibrillation (H)  Remains on metoprolol and ASA.  BP ranges in past week: 103/63 - 114/78,  Pulse ranges: 62-90.    Senile dementia without behavioral disturbance  Therapy recommending that pt, on discharge, move to skilled [facility] care unit due to cognitive loss.  Family is working with  for this transition.      Low weight  Weight is low but stale.     Slow transit constipation  BM's qd.    ALLERGIES: Boniva [ibandronic acid]; Food; Lexapro [escitalopram]; No clinical screening - see comments; and Zoloft [sertraline]  Past Medical, Surgical, Family and Social History reviewed and updated in [facility].    Current Outpatient Prescriptions   Medication Sig Dispense Refill     METOPROLOL TARTRATE PO Take 12.5 mg by mouth daily       ACETAMINOPHEN PO Take 650 mg by mouth 3 times daily       ACETAMINOPHEN PO Take 650 mg by mouth every 4 hours as needed for pain       calcium carbonate (TUMS) 500 MG chewable tablet Take 1 tablet (500 mg) by mouth 4 times daily as needed for heartburn 150 tablet      traMADol (ULTRAM) 50 MG tablet Take 0.5 tablets (25 mg) by mouth every 6 hours as needed for moderate pain 20 tablet 0     bisacodyl (DULCOLAX) 10 MG Suppository Place 10 mg  rectally every 3 days       magnesium hydroxide (MILK OF MAGNESIA) 400 MG/5ML suspension Take 30 mLs by mouth daily as needed for constipation or heartburn        senna-docusate (SENOKOT-S;PERICOLACE) 8.6-50 MG per tablet Take 2 tablets by mouth At Bedtime        docusate sodium (COLACE) 100 MG capsule Take 1 capsule (100 mg) by mouth every other day 60 capsule      emollient (VANICREAM) cream Apply topically 2 times daily       Misc Natural Products (GLUCOSAMINE CHOND COMPLEX/MSM PO) Take 1 tablet by mouth daily       allopurinol (ZYLOPRIM) 300 MG tablet Take 300 mg by mouth daily       aspirin 325 MG EC tablet Take 325 mg by mouth daily       levETIRAcetam (KEPPRA) 500 MG tablet Take 500 mg by mouth daily       Medications reviewed:  Medications reconciled to facility chart and changes were made to reflect current medications as identified as above med list. Below are the changes that were made:   Medications stopped since last EPIC medication reconciliation:   Medications Discontinued During This Encounter   Medication Reason     metoprolol (LOPRESSOR) 25 MG tablet      acetaminophen (TYLENOL) 325 MG tablet        Medications started since last [facility] medication reconciliation:  Orders Placed This Encounter   Medications     METOPROLOL TARTRATE PO     Sig: Take 12.5 mg by mouth daily     ACETAMINOPHEN PO     Sig: Take 650 mg by mouth 3 times daily     Patient Active Problem List   Diagnosis     History of hepatitis A  in 1980     History of hepatitis B in 1980     Cyst on ear, left, 2/25/2013     H/O squamous cell carcinoma of skin, right hand, 11/28/2011     Ganglion cyst, 1st MTP left 3/25/2009     Seizure disorder (H)     H/O renal calculi, 1987     PVC's (premature ventricular contractions)     H/O small bowel obstruction, lysis of adhesions, 1970     Osteoporosis, Bone Density 2013: Femeral neck: -2.6 (L) and -2.3 (R)     Cardiomegaly     Diverticulosis of large intestine without hemorrhage     Bilateral dry  "eyes     Heart murmur     Ataxia     Chronic fatigue     Major depression in complete remission (H)     Benign essential hypertension     Primary osteoarthritis involving multiple joints     PVD (peripheral vascular disease) (H)     Abnormal mammogram (3./12/2015: 4mm grouping of calcificaions in the posterior central right breast)     Advance Care Planning     Senile dementia without behavioral disturbance     Low weight     External hemorrhoids     Chronic atrial fibrillation (H)     Dermatitis due to unknown cause, scalp     Dislocated shoulder, right, sequela     Slow transit constipation     REVIEW OF SYSTEMS:  Unobtainable secondary to cognitive impairment, and today pt reports no pain in her right shoulder..    Physical Exam:  /71  Pulse 76  Temp 98.2  F (36.8  C)  Resp 16  Ht 5' 1\" (1.549 m)  Wt 117 lb (53.1 kg)  BMI 22.11 kg/m2  GENERAL APPEARANCE: Alert, thin, female seen.  Pale and confused. Able to respond verbally. Appears tired..  .    HEAD: Normocephalic. No facial asymmetry  ENT: Mouth and posterior oropharynx normal, moist mucous membranes, [place], Has her own teeth. Has right ear hearing aide in place.    EYES: EOM normal, conjunctiva and lids normal, Wearing eyeglasses.  RESP: Quiet, effortless respirations. No cough. CTA bilaterally.  CV: Irregular rhythm, Grade 2/6 cardiac murmur. No LE edema. DP pulses +1 bilateral.  ABDOMEN: Soft thin abdomen. BS's positive all 4 quadrants. No tenderness with palpation. No guarding.   M/S: Imobilizer is  on her right arm.   Fading bruising noted involving entire right arm and axilla. Not tender to any touch of anterior or posterior shoulder. Ambulated with OT  Anxious that she would fall if let go, but denied any pain.   NEURO: Oriented to person only.  Numerous repetitive statements. Able to squeeze both hands firmly, without pain. .  No tremors or cogwheeling. .  PSYCH:  Confused, but calm.    Recent Labs:    CBC RESULTS:   Recent Labs   Lab Test " 11/06/17 11/03/17  02/13/15   WBC  7.4  11.2*   < >  7.2   RBC  3.59*  3.49*   < >  3.76*   HGB  11.9  11.8   < >  12.5   HCT  37.3  36.4   < >  38.5   MCV  103.9*  104.3*   < >  102*   MCH   --    --    --   33.2*   MCHC   --    --    --   32.6   RDW  13.2  13.4   < >  13.8   PLT  342  243   < >  223    < > = values in this interval not displayed.       Last Basic Metabolic Panel:  Recent Labs   Lab Test 11/06/17 11/03/17   NA  142  142   POTASSIUM  4.0  4.8   CHLORIDE  105  104   BRIJESH  9.4  9.6   CO2  27  26   BUN  18  32*   CR  0.59  0.94   GLC  117*  191*     GFR Estimate   Date Value Ref Range Status   11/06/2017 >60 >60 mL/min/1.73m2 Final   11/03/2017 53 (L) >60 mL/min/1.73m2 Final   08/24/2017 >60 >60 ml/min/1.73m2 Final   04/10/2017 >60 >60 ml/min/1.73m2 Final   02/20/2017 >60 >60 ml/min/1.73m2 Final       Liver Function Studies -   Recent Labs   Lab Test 10/17/16 02/13/15   PROTTOTAL  5.9  6.3   ALBUMIN  3.4  4.3   BILITOTAL  1.4  1.1   ALKPHOS  68  72   AST  21  23   ALT  13  16     Assessment/Plan:  (S43.004S) Dislocated shoulder, right, sequela  (primary encounter diagnosis)  Comment: Improved  Plan: DC imobilizer.  Continue therapy and monitor for any adverse changes.  Continue pain meds.    (I48.2) Chronic atrial fibrillation (H)  Comment: Chronic, but rate controlled  Plan: Continue metoprolol and ASA.  Monitor.    (F03.90) Senile dementia without behavioral disturbance  Comment: Advancing  Plan: COntinue with plans for discharge to skilled care.  Monitor.    (R63.6) Low weight  Comment: Chronic but stable  Plan: Continue supplements    (K59.01) Slow transit constipation  Comment: Chronic but controlled  Plan: Continue current POC.     Electronically signed by  RAFAEL Flowers CNP

## 2017-11-22 ENCOUNTER — TRANSFERRED RECORDS (OUTPATIENT)
Dept: HEALTH INFORMATION MANAGEMENT | Facility: CLINIC | Age: 82
End: 2017-11-22

## 2017-11-27 ENCOUNTER — DISCHARGE SUMMARY NURSING HOME (OUTPATIENT)
Dept: GERIATRICS | Facility: CLINIC | Age: 82
End: 2017-11-27
Payer: MEDICARE

## 2017-11-27 VITALS
DIASTOLIC BLOOD PRESSURE: 62 MMHG | HEART RATE: 63 BPM | SYSTOLIC BLOOD PRESSURE: 109 MMHG | HEIGHT: 61 IN | RESPIRATION RATE: 16 BRPM | BODY MASS INDEX: 20.39 KG/M2 | WEIGHT: 108 LBS | TEMPERATURE: 97.1 F

## 2017-11-27 DIAGNOSIS — M15.0 PRIMARY OSTEOARTHRITIS INVOLVING MULTIPLE JOINTS: ICD-10-CM

## 2017-11-27 DIAGNOSIS — S00.81XA SCRATCH OF FACE, INITIAL ENCOUNTER: ICD-10-CM

## 2017-11-27 DIAGNOSIS — K59.01 SLOW TRANSIT CONSTIPATION: ICD-10-CM

## 2017-11-27 DIAGNOSIS — I10 BENIGN ESSENTIAL HYPERTENSION: ICD-10-CM

## 2017-11-27 DIAGNOSIS — R63.6 LOW WEIGHT: ICD-10-CM

## 2017-11-27 DIAGNOSIS — F03.90 SENILE DEMENTIA WITHOUT BEHAVIORAL DISTURBANCE (H): ICD-10-CM

## 2017-11-27 DIAGNOSIS — I48.20 CHRONIC ATRIAL FIBRILLATION (H): ICD-10-CM

## 2017-11-27 DIAGNOSIS — S43.004S DISLOCATED SHOULDER, RIGHT, SEQUELA: Primary | ICD-10-CM

## 2017-11-27 PROCEDURE — 99316 NF DSCHRG MGMT 30 MIN+: CPT | Performed by: NURSE PRACTITIONER

## 2017-11-27 NOTE — PROGRESS NOTES
[place] [facility] DISCHARGE SUMMARY    PATIENT'S NAME: Yeni Pérez  YOB: 1927  MEDICAL RECORD NUMBER:  [id number]    PRIMARY CARE PROVIDER AND CLINIC RESPONSIBLE AFTER TRANSFER: Cassie Gonzalez [address] / [place] [address]     CODE STATUS/ADVANCE DIRECTIVES DISCUSSION:   DNR / DNI       Allergies   Allergen Reactions     Boniva [Ibandronic Acid]      Food      PINEAPPLE     Lexapro [Escitalopram]      No Clinical Screening - See Comments      Hexacycline     Zoloft [Sertraline]        TRANSFERRING PROVIDERS: RAFAEL Flowers CNP; Chu Garcia MD  DATE OF SNF ADMISSION:  November / 02 / 2017  DATE OF SNF (anticipated) DISCHARGE: November / 28 / 2017  DISCHARGE DISPOSITION: Nursing Home: [facility]   Nursing Facility: [facility] stay 10/30/17 to 11/2/17.     Condition on Discharge:  Stable.  Function:  Up with assistance.   Cognitive Scores: BIMS 7    Equipment: walker and wheelchair    DISCHARGE DIAGNOSIS:   1. Dislocated shoulder, right, sequela    2. Slow transit constipation    3. Senile dementia without behavioral disturbance    4. Low weight    5. Chronic atrial fibrillation (H)    6. Benign essential hypertension    7. Primary osteoarthritis involving multiple joints    8. Scratch of face, initial encounter        HPI Nursing Facility Course:  HPI information obtained from: facility chart records, facility staff, patient report and [facility] chart review.Pt is unreliable historian due to cognitive loss  Dislocated shoulder, right, sequela  Sustained following likely fall in her apartment, although this was not witnessed and pt has no recall of fall due to cognitive loss.  No longer requires but continues with pain with abduction of shoulder.  Remains on tylenol tid which has been helpful.  Ambulates with assist and is frightened to move unescorted.    Slow transit constipation  BM's qd to  qod.    Senile dementia without behavioral disturbance  Advancing cognitive loss with very poor short term memory.  Requires assist with most ADL's.    Low weight  Stable on supplement bid.    Chronic atrial fibrillation (H)  Remains on low dose metoprolol.  Pulse ranges in past week: 63-84.  ASA decreased to 81 mg today.    Benign essential hypertension  BP ranges in past 4 days; 104-116/65-76.  No reports of chest pain.    Primary osteoarthritis involving multiple joints  Chronic issue and tylenol is effective.    Scratch of face, initial encounter  Pt has been scratching her face.  Dry skin.      PAST MEDICAL HISTORY:  has a past medical history of Abnormal mammogram (3./12/2015: 4mm grouping of calcificaions in the posterior central right breast) (11/9/2015); Advanced directives, counseling/discussion, POLST completed 11/9/15, DNR/DNI (11/9/2015); Ataxia (11/9/2015); Benign essential hypertension (11/9/2015); Bilateral dry eyes (11/9/2015); Cardiomegaly (11/9/2015); Chronic atrial fibrillation (H) (4/10/2017); Chronic fatigue (11/9/2015); Cyst on ear, left, 2/25/2013 (11/9/2015); Dermatitis due to unknown cause, scalp (6/19/2017); Dislocated shoulder, right, sequela (11/3/2017); Diverticulosis of large intestine without hemorrhage (11/9/2015); External hemorrhoids (11/14/2016); Ganglion cyst, 1st MTP left 3/25/2009 (11/9/2015); H/O renal calculi, 1987 (11/9/2015); H/O small bowel obstruction, lysis of adhesions, 1970 (11/9/2015); H/O squamous cell carcinoma of skin, right hand, 11/28/2011 (11/9/2015); Heart murmur (11/9/2015); History of hepatitis A  in 1980 (11/5/2015); History of hepatitis B in 1980 (11/5/2015); Low weight (12/4/2015); Major depression in complete remission (H) (11/9/2015); Osteoporosis, Bone Density 2013: Femeral neck: -2.6 (L) and -2.3 (R) (11/9/2015); Primary osteoarthritis involving multiple joints (11/9/2015); PVC's (premature ventricular contractions) (11/9/2015); PVD (peripheral  "vascular disease) (H) (11/9/2015); Seizure disorder (H) (11/9/2015); Senile dementia without behavioral disturbance (12/4/2015); Shingles; and Slow transit constipation (11/20/2017).    DISCHARGE MEDICATIONS:  Current Outpatient Prescriptions   Medication Sig Dispense Refill     aspirin 81 MG EC tablet Take 1 tablet (81 mg) by mouth daily 90 tablet 3     METOPROLOL TARTRATE PO Take 12.5 mg by mouth daily       ACETAMINOPHEN PO Take 650 mg by mouth 3 times daily       ACETAMINOPHEN PO Take 650 mg by mouth every 4 hours as needed for pain       traMADol (ULTRAM) 50 MG tablet Take 0.5 tablets (25 mg) by mouth every 6 hours as needed for moderate pain 20 tablet 0     bisacodyl (DULCOLAX) 10 MG Suppository Place 10 mg rectally every 3 days       magnesium hydroxide (MILK OF MAGNESIA) 400 MG/5ML suspension Take 30 mLs by mouth daily as needed for constipation or heartburn        senna-docusate (SENOKOT-S;PERICOLACE) 8.6-50 MG per tablet Take 2 tablets by mouth At Bedtime        docusate sodium (COLACE) 100 MG capsule Take 1 capsule (100 mg) by mouth every other day 60 capsule      emollient (VANICREAM) cream Apply topically 2 times daily       Misc Natural Products (GLUCOSAMINE CHOND COMPLEX/MSM PO) Take 1 tablet by mouth daily       allopurinol (ZYLOPRIM) 300 MG tablet Take 300 mg by mouth daily       levETIRAcetam (KEPPRA) 500 MG tablet Take 500 mg by mouth daily         MEDICATION CHANGES/RATIONALE:   1.  Metoprolol decreased due to hypotension  2.  ASA decreased  3.  Bowel meds adjusted  4.  Tylenol tid for pain control  Controlled medications sent with patient: not applicable/none  - Pt is staying in facility and tramadol script is current.     ROS:    Unobtainable secondary to cognitive impairment, but today pt reports no pain in shoulder until exam.  No recall of injury.    Physical Exam:   Vitals: /62  Pulse 63  Temp 97.1  F (36.2  C)  Resp 16  Ht 5' 1\" (1.549 m)  Wt 108 lb (49 kg)  BMI 20.41 " kg/m2  BMI= Body mass index is 20.41 kg/(m^2).    GENERAL APPEARANCE: Alert, thin, female seen.  Pale and confused. Able to respond verbally. Appears tired..  .    HEAD: Normocephalic. No facial asymmetry.  Multiple scratch marks on face. Lower lip also dry and slightly swollen.  ENT: Mouth and posterior oropharynx normal, moist mucous membranes, [place], Has her own teeth. Has right ear hearing aide in place.    EYES: EOM normal, conjunctiva and lids normal, Wearing eyeglasses.  RESP: Quiet, effortless respirations. No cough. CTA bilaterally.  CV: Irregular rhythm, Grade 2/6 cardiac murmur. No LE edema. DP pulses +1 bilateral.  ABDOMEN: Soft thin abdomen. BS's positive all 4 quadrants. No tenderness with palpation. No guarding.   M/S:Fading bruising noted right upper arm.  Not tender to any touch of anterior or posterior shoulder, but mild abduction causes pain. No pain with ROM of lower extremities.    NEURO: Oriented to person only.  Numerous repetitive statements. Able to squeeze both hands firmly, without pain. .  No tremors or cogwheeling. .  PSYCH:  Confused, but calm.    DISCHARGE PLAN:  Skilled nursing care on memory care unit.  Therapy as needed.  Patient instructed to follow-up with:  PCP in 7 days      Current [place] scheduled appointments: Dr. Garcia on 12/11  Future Appointments  Date Time Provider Department Center   11/27/2017 11:00 AM Cassie Gonzalez, RFAAEL CNP [facility] referral needed and placed by this provider: No    Pending labs: none  SNF labs   CBC RESULTS:   Recent Labs   Lab Test 11/06/17 11/03/17  02/13/15   WBC  7.4  11.2*   < >  7.2   RBC  3.59*  3.49*   < >  3.76*   HGB  11.9  11.8   < >  12.5   HCT  37.3  36.4   < >  38.5   MCV  103.9*  104.3*   < >  102*   MCH   --    --    --   33.2*   MCHC   --    --    --   32.6   RDW  13.2  13.4   < >  13.8   PLT  342  243   < >  223    < > = values in this interval not displayed.       Last Basic Metabolic Panel:  Recent  Labs   Lab Test 11/06/17 11/03/17   NA  142  142   POTASSIUM  4.0  4.8   CHLORIDE  105  104   BRIJESH  9.4  9.6   CO2  27  26   BUN  18  32*   CR  0.59  0.94   GLC  117*  191*     GFR Estimate   Date Value Ref Range Status   11/06/2017 >60 >60 mL/min/1.73m2 Final   11/03/2017 53 (L) >60 mL/min/1.73m2 Final   08/24/2017 >60 >60 ml/min/1.73m2 Final   04/10/2017 >60 >60 ml/min/1.73m2 Final   02/20/2017 >60 >60 ml/min/1.73m2 Final       Discharge Treatments: Ambulate and transfer with assistance.  Continue with assistance with ADL's.  Lotion to face.     TOTAL DISCHARGE TIME:   Greater than 30 minutes  Electronically signed by:  RAFAEL Flowers CNP

## 2017-12-11 ENCOUNTER — NURSING HOME VISIT (OUTPATIENT)
Dept: GERIATRICS | Facility: CLINIC | Age: 82
End: 2017-12-11

## 2017-12-11 DIAGNOSIS — I48.20 CHRONIC ATRIAL FIBRILLATION (H): ICD-10-CM

## 2017-12-11 DIAGNOSIS — F03.91 DEMENTIA WITH BEHAVIORAL DISTURBANCE, UNSPECIFIED DEMENTIA TYPE: ICD-10-CM

## 2017-12-11 DIAGNOSIS — S43.004S DISLOCATED SHOULDER, RIGHT, SEQUELA: Primary | ICD-10-CM

## 2017-12-13 NOTE — PROGRESS NOTES
Pt has recently moved to LTC after stay on TCU for rehab after suffering a dislocated R shoulder    She has continued to frequently refuse cares, including showering    Pt states she feels fine, denies shoulder pain    VSS  Frail appearing. Oriented to self  Disheveled appearing  Lungs clear  CV irreg, HR 70  Abd soft  R shoulder ROM was not assessed    Assessment    Dementia, advanced, with behavioral concerns as noted above  Recent R shoulder dislocation stable  Chronic afib, HR controlled    Plan   Continue current cares  LTC.

## 2018-01-01 ENCOUNTER — NURSING HOME VISIT (OUTPATIENT)
Dept: GERIATRICS | Facility: CLINIC | Age: 83
End: 2018-01-01
Payer: MEDICARE

## 2018-01-01 ENCOUNTER — TRANSFERRED RECORDS (OUTPATIENT)
Dept: HEALTH INFORMATION MANAGEMENT | Facility: CLINIC | Age: 83
End: 2018-01-01

## 2018-01-01 VITALS
DIASTOLIC BLOOD PRESSURE: 75 MMHG | HEIGHT: 61 IN | RESPIRATION RATE: 18 BRPM | WEIGHT: 118 LBS | BODY MASS INDEX: 22.28 KG/M2 | TEMPERATURE: 97.6 F | HEART RATE: 74 BPM | SYSTOLIC BLOOD PRESSURE: 130 MMHG

## 2018-01-01 DIAGNOSIS — I48.20 CHRONIC ATRIAL FIBRILLATION (H): ICD-10-CM

## 2018-01-01 DIAGNOSIS — I10 BENIGN ESSENTIAL HYPERTENSION: ICD-10-CM

## 2018-01-01 DIAGNOSIS — M24.60 FROZEN JOINT: ICD-10-CM

## 2018-01-01 DIAGNOSIS — G40.909 SEIZURE DISORDER (H): ICD-10-CM

## 2018-01-01 DIAGNOSIS — M15.0 PRIMARY OSTEOARTHRITIS INVOLVING MULTIPLE JOINTS: ICD-10-CM

## 2018-01-01 DIAGNOSIS — F03.91 DEMENTIA WITH BEHAVIORAL DISTURBANCE, UNSPECIFIED DEMENTIA TYPE: Primary | ICD-10-CM

## 2018-01-01 DIAGNOSIS — G40.909 SEIZURE DISORDER (H): Primary | ICD-10-CM

## 2018-01-01 DIAGNOSIS — F03.91 DEMENTIA WITH BEHAVIORAL DISTURBANCE, UNSPECIFIED DEMENTIA TYPE: ICD-10-CM

## 2018-01-01 LAB
BUN SERPL-MCNC: 16 MG/DL (ref 9–26)
CALCIUM SERPL-MCNC: 9.7 MG/DL (ref 8.4–10.4)
CHLORIDE SERPLBLD-SCNC: 106 MMOL/L (ref 98–109)
CO2 SERPL-SCNC: 28 MMOL/L (ref 22–31)
CREAT SERPL-MCNC: 0.77 MG/DL (ref 0.55–1.02)
GFR SERPL CREATININE-BSD FRML MDRD: >60 ML/MIN/1.73M2
GLUCOSE SERPL-MCNC: 77 MG/DL (ref 70–100)
HEMOGLOBIN: 12.8 G/DL (ref 11.8–15.5)
POTASSIUM SERPL-SCNC: 4.4 MMOL/L (ref 3.5–5.2)
SODIUM SERPL-SCNC: 143 MMOL/L (ref 136–145)

## 2018-01-01 PROCEDURE — 99309 SBSQ NF CARE MODERATE MDM 30: CPT | Performed by: NURSE PRACTITIONER

## 2018-01-25 ENCOUNTER — NURSING HOME VISIT (OUTPATIENT)
Dept: GERIATRICS | Facility: CLINIC | Age: 83
End: 2018-01-25
Payer: MEDICARE

## 2018-01-25 VITALS
SYSTOLIC BLOOD PRESSURE: 109 MMHG | BODY MASS INDEX: 20.39 KG/M2 | TEMPERATURE: 97.8 F | RESPIRATION RATE: 18 BRPM | WEIGHT: 108 LBS | DIASTOLIC BLOOD PRESSURE: 70 MMHG | HEART RATE: 76 BPM | HEIGHT: 61 IN

## 2018-01-25 DIAGNOSIS — R46.0 POOR HYGIENE: Primary | ICD-10-CM

## 2018-01-25 DIAGNOSIS — I10 BENIGN ESSENTIAL HYPERTENSION: ICD-10-CM

## 2018-01-25 DIAGNOSIS — I73.9 PVD (PERIPHERAL VASCULAR DISEASE) (H): ICD-10-CM

## 2018-01-25 DIAGNOSIS — I48.20 CHRONIC ATRIAL FIBRILLATION (H): ICD-10-CM

## 2018-01-25 DIAGNOSIS — E44.1 MILD PROTEIN-CALORIE MALNUTRITION (H): ICD-10-CM

## 2018-01-25 DIAGNOSIS — F03.91 DEMENTIA WITH BEHAVIORAL DISTURBANCE, UNSPECIFIED DEMENTIA TYPE: ICD-10-CM

## 2018-01-25 PROBLEM — E46 PROTEIN-CALORIE MALNUTRITION (H): Status: ACTIVE | Noted: 2018-01-25

## 2018-01-25 PROCEDURE — 99309 SBSQ NF CARE MODERATE MDM 30: CPT | Performed by: NURSE PRACTITIONER

## 2018-01-25 NOTE — PROGRESS NOTES
[place] [facility]    Chief Complaint   Patient presents with     shelter Regulatory       HPI:    Yeni Pérez is a 89 year old  (2/9/1927), who is being seen today for a federally mandated E/M visit at [facility].  HPI information obtained from: facility chart records, facility staff, patient report and [facility] chart review. Pt is unreliable historian due to dementia. Today's concerns are:  Poor hygiene  Has dementia and has been refusing any attempts by staff to bathe her.  SHe feeds herself.  Lacks insight into her hygiene issues and does not recall her refusal of attempts to assist with hygiene.    Mild protein-calorie malnutrition (H)  Has refused weights this month.  Will not permit staff to assist with meals.  Did eat 100% of her breakfast today.    Dementia with behavioral disturbance, unspecified dementia type  Resides on memory care unit since dislocation of her right shoulder in October in her assisted living apartment.  Had fallen and did not call for help and following TCU stay, it was determined that she was not safe for assisted living.  Very resistant to any attempts to provide hygiene with noted BM under fingernails at times.  Will become physically combative with staff.    Atrial Fibrillation  Pulse ranges this month: 68-84.  No reports of obvious chest pain.  On ASA daily for anticoagulation.  Is not a candidate for coumadin therapy due to her fall risk    PVD (peripheral vascular disease) (H)  No pedal wounds reported.  Is on ASA daily.    Benign essential hypertension  BP ranges in past month: 105//70.  No reports of chest pain.      ALLERGIES: Boniva [ibandronic acid]; Food; Lexapro [escitalopram]; No clinical screening - see comments; and Zoloft [sertraline]  PAST MEDICAL HISTORY:  has a past medical history of Abnormal mammogram (3./12/2015: 4mm grouping of calcificaions in the posterior central right breast) (11/9/2015); Advanced  directives, counseling/discussion, POLST completed 11/9/15, DNR/DNI (11/9/2015); Ataxia (11/9/2015); Benign essential hypertension (11/9/2015); Bilateral dry eyes (11/9/2015); Cardiomegaly (11/9/2015); Chronic atrial fibrillation (H) (4/10/2017); Chronic fatigue (11/9/2015); Cyst on ear, left, 2/25/2013 (11/9/2015); Dementia with behavioral disturbance, unspecified dementia type (1/25/2018); Dermatitis due to unknown cause, scalp (6/19/2017); Dislocated shoulder, right, sequela (11/3/2017); Diverticulosis of large intestine without hemorrhage (11/9/2015); External hemorrhoids (11/14/2016); Ganglion cyst, 1st MTP left 3/25/2009 (11/9/2015); H/O renal calculi, 1987 (11/9/2015); H/O small bowel obstruction, lysis of adhesions, 1970 (11/9/2015); H/O squamous cell carcinoma of skin, right hand, 11/28/2011 (11/9/2015); Heart murmur (11/9/2015); History of hepatitis A  in 1980 (11/5/2015); History of hepatitis B in 1980 (11/5/2015); Low weight (12/4/2015); Major depression in complete remission (H) (11/9/2015); Osteoporosis, Bone Density 2013: Femeral neck: -2.6 (L) and -2.3 (R) (11/9/2015); Primary osteoarthritis involving multiple joints (11/9/2015); PVC's (premature ventricular contractions) (11/9/2015); PVD (peripheral vascular disease) (H) (11/9/2015); Seizure disorder (H) (11/9/2015); Senile dementia without behavioral disturbance (12/4/2015); Shingles; and Slow transit constipation (11/20/2017).  PAST SURGICAL HISTORY:  has a past surgical history that includes appendectomy; Cystectomy ovarian benign; LAP,LYSIS OF ADHESIONS; Bunionectomy; and cataract iol, rt/lt.  FAMILY HISTORY: family history includes Colon Cancer in her mother; Depression in her daughter; Leukemia in her brother; Myocardial Infarction in her father; Ovarian Cancer in her sister.  SOCIAL HISTORY:  reports that she has quit smoking. She does not have any smokeless tobacco history on file. She reports that she drinks  alcohol.    MEDICATIONS:  Current Outpatient Prescriptions   Medication Sig Dispense Refill     aspirin 81 MG EC tablet Take 1 tablet (81 mg) by mouth daily 90 tablet 3     METOPROLOL TARTRATE PO Take 12.5 mg by mouth daily       ACETAMINOPHEN PO Take 650 mg by mouth 3 times daily       ACETAMINOPHEN PO Take 650 mg by mouth every 4 hours as needed for pain       traMADol (ULTRAM) 50 MG tablet Take 0.5 tablets (25 mg) by mouth every 6 hours as needed for moderate pain 20 tablet 0     bisacodyl (DULCOLAX) 10 MG Suppository Place 10 mg rectally every 3 days       magnesium hydroxide (MILK OF MAGNESIA) 400 MG/5ML suspension Take 30 mLs by mouth daily as needed for constipation or heartburn        senna-docusate (SENOKOT-S;PERICOLACE) 8.6-50 MG per tablet Take 2 tablets by mouth At Bedtime        docusate sodium (COLACE) 100 MG capsule Take 1 capsule (100 mg) by mouth every other day 60 capsule      emollient (VANICREAM) cream Apply topically 2 times daily       Misc Natural Products (GLUCOSAMINE CHOND COMPLEX/MSM PO) Take 1 tablet by mouth daily       allopurinol (ZYLOPRIM) 300 MG tablet Take 300 mg by mouth daily       levETIRAcetam (KEPPRA) 500 MG tablet Take 500 mg by mouth daily       Medications reviewed:  Medications reconciled to facility chart and changes were made to reflect current medications as identified as above med list. Below are the changes that were made:   Medications stopped since last EPIC medication reconciliation:   There are no discontinued medications.    Medications started since last [facility] medication reconciliation:  No orders of the defined types were placed in this encounter.    Patient Active Problem List   Diagnosis     History of hepatitis A  in 1980     History of hepatitis B in 1980     Cyst on ear, left, 2/25/2013     H/O squamous cell carcinoma of skin, right hand, 11/28/2011     Ganglion cyst, 1st MTP left 3/25/2009     Seizure disorder (H)     H/O renal calculi, 1987     PVC's  "(premature ventricular contractions)     H/O small bowel obstruction, lysis of adhesions, 1970     Osteoporosis, Bone Density 2013: Femeral neck: -2.6 (L) and -2.3 (R)     Cardiomegaly     Diverticulosis of large intestine without hemorrhage     Bilateral dry eyes     Heart murmur     Ataxia     Chronic fatigue     Major depression in complete remission (H)     Benign essential hypertension     Primary osteoarthritis involving multiple joints     PVD (peripheral vascular disease) (H)     Abnormal mammogram (3./12/2015: 4mm grouping of calcificaions in the posterior central right breast)     Advance Care Planning     Low weight     External hemorrhoids     Chronic atrial fibrillation (H)     Dermatitis due to unknown cause, scalp     Dislocated shoulder, right, sequela     Slow transit constipation     Protein-calorie malnutrition (H)     Dementia with behavioral disturbance, unspecified dementia type       Case Management:  I have reviewed the care plan and MDS and do agree with the plan. Patient's desire to return to the community is not assessible due to cognitive impairment.  Information reviewed:  Medications, vital signs, orders, and nursing notes.    ROS:  Unobtainable secondary to cognitive impairment, but today pt reports no pain.  No recall of injury.    Exam:  Vitals: /70  Pulse 76  Temp 97.8  F (36.6  C)  Resp 18  Ht 5' 1\" (1.549 m)  Wt 108 lb (49 kg)  BMI 20.41 kg/m2  BMI= Body mass index is 20.41 kg/(m^2).  GENERAL APPEARANCE: Alert, thin,disheveled female seen.  Pale and confused. Able to respond verbally. Poor recall of any recent past events..  .    HEAD: Normocephalic. No facial asymmetry.  Multiple scratch marks on face..  ENT: Mouth and posterior oropharynx normal, moist mucous membranes, [place], Has her own teeth. Has right ear hearing aide in place.    EYES: EOM normal, conjunctiva and lids normal, Wearing eyeglasses.  RESP: Quiet, effortless respirations. No cough. CTA " "bilaterally.  CV: Irregular rhythm, Grade 2/6 cardiac murmur. No LE edema. DP pulses +1 bilateral.  ABDOMEN: Soft thin abdomen. BS's positive all 4 quadrants. No tenderness with palpation. No guarding.   M/S:. No pain with ROM of lower extremities or palpation of spinal process.    NEURO: Oriented to person only.  Numerous repetitive statements. Able to squeeze both hands firmly, without pain. .  No tremors or cogwheeling.  SKIN:  No pedal wounds.  Pedal and LE varicosities.  Skin warm and dry. .  PSYCH:  Confused, and became easily agitated when bathing discussed.  Stated \"I will take care of it myself\".    Lab/Diagnostic data:   CBC RESULTS:   Recent Labs   Lab Test 11/06/17 11/03/17  02/13/15   WBC  7.4  11.2*   < >  7.2   RBC  3.59*  3.49*   < >  3.76*   HGB  11.9  11.8   < >  12.5   HCT  37.3  36.4   < >  38.5   MCV  103.9*  104.3*   < >  102*   MCH   --    --    --   33.2*   MCHC   --    --    --   32.6   RDW  13.2  13.4   < >  13.8   PLT  342  243   < >  223    < > = values in this interval not displayed.       Last Basic Metabolic Panel:  Recent Labs   Lab Test 11/06/17 11/03/17   NA  142  142   POTASSIUM  4.0  4.8   CHLORIDE  105  104   BRIJESH  9.4  9.6   CO2  27  26   BUN  18  32*   CR  0.59  0.94   GLC  117*  191*     GFR Estimate   Date Value Ref Range Status   11/06/2017 >60 >60 mL/min/1.73m2 Final   11/03/2017 53 (L) >60 mL/min/1.73m2 Final   08/24/2017 >60 >60 ml/min/1.73m2 Final   04/10/2017 >60 >60 ml/min/1.73m2 Final   02/20/2017 >60 >60 ml/min/1.73m2 Final       ASSESSMENT/PLAN  (R46.0) Poor hygiene  (primary encounter diagnosis)  Comment: Secondary to physical resistiveness to staff attempts to assist  Plan: Will try ativan 0.25 mg tomorrow morning before bath.  Nsg to update NP with results.  Daughter has declined to be of assistance.    (E44.1) Mild protein-calorie malnutrition (H)  Comment: Improved  Plan: Continue to monitor when she will permit.    (I48.2) Chronic atrial fibrillation " (H)  Comment: Rate controlled  Plan: Continue metoprolol and ASA.  Monitor.    (F03.91) Dementia with behavioral disturbance, unspecified dementia type  Comment: Advancing  Plan: Continue pOC on memory care unit.  Will attempt ativan tomorrow, to determine if that will facilitate bathing.    (I73.9) PVD (peripheral vascular disease) (H)  Comment: Chronic  Plan: Continue to monitor pedal skin.    (I10) Benign essential hypertension  Comment: BP goal; <150/90, at goal  Plan: Continue current POC.  Hgb and BMP on Monday.     Electronically signed by:  RAFAEL Flowers CNP

## 2018-01-29 ENCOUNTER — TRANSFERRED RECORDS (OUTPATIENT)
Dept: HEALTH INFORMATION MANAGEMENT | Facility: CLINIC | Age: 83
End: 2018-01-29

## 2018-01-29 LAB
BUN SERPL-MCNC: 23 MG/DL (ref 9–26)
CALCIUM SERPL-MCNC: 9.6 MG/DL (ref 8.4–10.2)
CHLORIDE SERPLBLD-SCNC: 104 MMOL/L (ref 98–109)
CO2 SERPL-SCNC: 29 MMOL/L (ref 22–31)
CREAT SERPL-MCNC: 0.72 MG/DL (ref 0.55–1.02)
GFR SERPL CREATININE-BSD FRML MDRD: >60 ML/MIN/1.73M2
GLUCOSE SERPL-MCNC: 91 MG/DL (ref 70–100)
HEMOGLOBIN: 12.9 G/DL (ref 11.8–15.5)
POTASSIUM SERPL-SCNC: 4.3 MMOL/L (ref 3.5–5.2)
SODIUM SERPL-SCNC: 141 MMOL/L (ref 136–145)

## 2018-02-13 ENCOUNTER — NURSING HOME VISIT (OUTPATIENT)
Dept: GERIATRICS | Facility: CLINIC | Age: 83
End: 2018-02-13
Payer: MEDICARE

## 2018-02-13 VITALS
TEMPERATURE: 97.8 F | HEART RATE: 84 BPM | HEIGHT: 61 IN | BODY MASS INDEX: 21.34 KG/M2 | SYSTOLIC BLOOD PRESSURE: 136 MMHG | WEIGHT: 113 LBS | RESPIRATION RATE: 18 BRPM | DIASTOLIC BLOOD PRESSURE: 78 MMHG

## 2018-02-13 DIAGNOSIS — M81.8 OTHER OSTEOPOROSIS WITHOUT CURRENT PATHOLOGICAL FRACTURE: ICD-10-CM

## 2018-02-13 DIAGNOSIS — I73.9 PVD (PERIPHERAL VASCULAR DISEASE) (H): ICD-10-CM

## 2018-02-13 DIAGNOSIS — M15.0 PRIMARY OSTEOARTHRITIS INVOLVING MULTIPLE JOINTS: ICD-10-CM

## 2018-02-13 DIAGNOSIS — G40.909 SEIZURE DISORDER (H): Primary | ICD-10-CM

## 2018-02-13 DIAGNOSIS — I10 BENIGN ESSENTIAL HYPERTENSION: ICD-10-CM

## 2018-02-13 PROCEDURE — 99309 SBSQ NF CARE MODERATE MDM 30: CPT | Performed by: NURSE PRACTITIONER

## 2018-02-13 NOTE — PROGRESS NOTES
[place] [facility]    Chief Complaint   Patient presents with     shelter Regulatory     HPI:    Yeni Pérez is a 89 year old  (2/9/1927), who is being seen today for a federally mandated E/M visit at [facility].  HPI information obtained from: facility chart records, facility staff, patient report and [facility] chart review. Pt is minimally able to participate in ROS due to cognitive loss.Today's concerns are:  Seizure disorder (H)  No reports of obvious seizures.  Remains on Keppra.    Other osteoporosis without current pathological fracture  Remains on glucosamine.  No reports of pain.  Ambulating minimally.    Benign essential hypertension  BP ranges in past month: 126-142/76-80.  Remains on metoprolol and ASA  Pulse ranges: 70-84.  Has atrial fibrillation, but no chest pain or rates >100/min.    Primary osteoarthritis involving multiple joints  Remains on tylenol tid, but dislikes taking medication and has no reports of pain.    PVD (peripheral vascular disease) (H)  No reports of pedal wounds.    ALLERGIES: Boniva [ibandronic acid]; Food; Lexapro [escitalopram]; No clinical screening - see comments; and Zoloft [sertraline]  PAST MEDICAL HISTORY:  has a past medical history of Abnormal mammogram (3./12/2015: 4mm grouping of calcificaions in the posterior central right breast) (11/9/2015); Advanced directives, counseling/discussion, POLST completed 11/9/15, DNR/DNI (11/9/2015); Ataxia (11/9/2015); Benign essential hypertension (11/9/2015); Bilateral dry eyes (11/9/2015); Cardiomegaly (11/9/2015); Chronic atrial fibrillation (H) (4/10/2017); Chronic fatigue (11/9/2015); Cyst on ear, left, 2/25/2013 (11/9/2015); Dementia with behavioral disturbance, unspecified dementia type (1/25/2018); Dermatitis due to unknown cause, scalp (6/19/2017); Dislocated shoulder, right, sequela (11/3/2017); Diverticulosis of large intestine without hemorrhage (11/9/2015); External  hemorrhoids (11/14/2016); Ganglion cyst, 1st MTP left 3/25/2009 (11/9/2015); H/O renal calculi, 1987 (11/9/2015); H/O small bowel obstruction, lysis of adhesions, 1970 (11/9/2015); H/O squamous cell carcinoma of skin, right hand, 11/28/2011 (11/9/2015); Heart murmur (11/9/2015); History of hepatitis A  in 1980 (11/5/2015); History of hepatitis B in 1980 (11/5/2015); Low weight (12/4/2015); Major depression in complete remission (H) (11/9/2015); Osteoporosis, Bone Density 2013: Femeral neck: -2.6 (L) and -2.3 (R) (11/9/2015); Primary osteoarthritis involving multiple joints (11/9/2015); PVC's (premature ventricular contractions) (11/9/2015); PVD (peripheral vascular disease) (H) (11/9/2015); Seizure disorder (H) (11/9/2015); Senile dementia without behavioral disturbance (12/4/2015); Shingles; and Slow transit constipation (11/20/2017).  PAST SURGICAL HISTORY:  has a past surgical history that includes appendectomy; Cystectomy ovarian benign; LAP,LYSIS OF ADHESIONS; Bunionectomy; and cataract iol, rt/lt.  FAMILY HISTORY: family history includes Colon Cancer in her mother; Depression in her daughter; Leukemia in her brother; Myocardial Infarction in her father; Ovarian Cancer in her sister.  SOCIAL HISTORY:  reports that she has quit smoking. She does not have any smokeless tobacco history on file. She reports that she drinks alcohol.    MEDICATIONS:  Current Outpatient Prescriptions   Medication Sig Dispense Refill     aspirin 81 MG EC tablet Take 1 tablet (81 mg) by mouth daily 90 tablet 3     METOPROLOL TARTRATE PO Take 12.5 mg by mouth daily       ACETAMINOPHEN PO Take 650 mg by mouth 3 times daily       ACETAMINOPHEN PO Take 650 mg by mouth every 4 hours as needed for pain       traMADol (ULTRAM) 50 MG tablet Take 0.5 tablets (25 mg) by mouth every 6 hours as needed for moderate pain 20 tablet 0     bisacodyl (DULCOLAX) 10 MG Suppository Place 10 mg rectally every 3 days       magnesium hydroxide (MILK OF  MAGNESIA) 400 MG/5ML suspension Take 30 mLs by mouth daily as needed for constipation or heartburn        senna-docusate (SENOKOT-S;PERICOLACE) 8.6-50 MG per tablet Take 2 tablets by mouth At Bedtime        docusate sodium (COLACE) 100 MG capsule Take 1 capsule (100 mg) by mouth every other day 60 capsule      Misc Natural Products (GLUCOSAMINE CHOND COMPLEX/MSM PO) Take 1 tablet by mouth daily       allopurinol (ZYLOPRIM) 300 MG tablet Take 300 mg by mouth daily       levETIRAcetam (KEPPRA) 500 MG tablet Take 500 mg by mouth daily       emollient (VANICREAM) cream Apply topically 2 times daily       Medications reviewed:  Medications reconciled to facility chart and changes were made to reflect current medications as identified as above med list. Below are the changes that were made:   Medications stopped since last EPIC medication reconciliation:   There are no discontinued medications.    Medications started since last [facility] medication reconciliation:  No orders of the defined types were placed in this encounter.    Patient Active Problem List   Diagnosis     History of hepatitis A  in 1980     History of hepatitis B in 1980     Cyst on ear, left, 2/25/2013     H/O squamous cell carcinoma of skin, right hand, 11/28/2011     Ganglion cyst, 1st MTP left 3/25/2009     Seizure disorder (H)     H/O renal calculi, 1987     PVC's (premature ventricular contractions)     H/O small bowel obstruction, lysis of adhesions, 1970     Osteoporosis, Bone Density 2013: Femeral neck: -2.6 (L) and -2.3 (R)     Cardiomegaly     Diverticulosis of large intestine without hemorrhage     Bilateral dry eyes     Heart murmur     Ataxia     Chronic fatigue     Benign essential hypertension     Primary osteoarthritis involving multiple joints     PVD (peripheral vascular disease) (H)     Abnormal mammogram (3./12/2015: 4mm grouping of calcificaions in the posterior central right breast)     Advance Care Planning     Low weight     External  "hemorrhoids     Chronic atrial fibrillation (H)     Dermatitis due to unknown cause, scalp     Dislocated shoulder, right, sequela     Slow transit constipation     Protein-calorie malnutrition (H)     Dementia with behavioral disturbance, unspecified dementia type       Case Management:  I have reviewed the care plan and MDS and do agree with the plan. Patient's desire to return to the community is not assessible due to cognitive impairment.  Information reviewed:  Medications, vital signs, orders, and nursing notes.    ROS:  Unobtainable secondary to cognitive impairment, but today pt reports no pain or concerns.    Exam:  Vitals: /78  Pulse 84  Temp 97.8  F (36.6  C)  Resp 18  Ht 5' 1\" (1.549 m)  Wt 113 lb (51.3 kg)  BMI 21.35 kg/m2  BMI= Body mass index is 21.35 kg/(m^2).  GENERAL APPEARANCE: Alert, thin,disheveled female seen.  Pale and confused. Able to respond verbally. Poor recall of any recent past events..  .    HEAD: Normocephalic. No facial asymmetry.  Multiple scratch marks on face..  ENT: Mouth and posterior oropharynx normal, moist mucous membranes, [place], Has her own teeth. Has right ear hearing aide in place.    EYES: EOM normal, conjunctiva and lids normal, Wearing eyeglasses.  RESP: Quiet, effortless respirations. No cough. CTA bilaterally.  CV: Irregular rhythm, Grade 2/6 cardiac murmur. No LE edema. DP pulses +1 bilateral.  ABDOMEN: Soft thin abdomen. BS's positive all 4 quadrants. No tenderness with palpation. No guarding.   M/S:. No pain with ROM of lower extremities or palpation of spinal process. Limited ROM of right shoulder (h/o dislocation).     NEURO: Oriented to person only.  Numerous repetitive statements. Able to squeeze both hands firmly, without pain. .  No tremors or cogwheeling.  SKIN:  No pedal wounds.  Pedal and LE varicosities.  Skin warm and dry. .  PSYCH:  Confused, and would frequently ask who I was, and shortly thereafter ask again.     Lab/Diagnostic data:   CBC " RESULTS:   Recent Labs   Lab Test 01/29/18 11/06/17 11/03/17  02/13/15   WBC   --   7.4  11.2*   < >  7.2   RBC   --   3.59*  3.49*   < >  3.76*   HGB  12.9  11.9  11.8   < >  12.5   HCT   --   37.3  36.4   < >  38.5   MCV   --   103.9*  104.3*   < >  102*   MCH   --    --    --    --   33.2*   MCHC   --    --    --    --   32.6   RDW   --   13.2  13.4   < >  13.8   PLT   --   342  243   < >  223    < > = values in this interval not displayed.       Last Basic Metabolic Panel:  Recent Labs   Lab Test 01/29/18 11/06/17   NA  141  142   POTASSIUM  4.3  4.0   CHLORIDE  104  105   BRIJESH  9.6  9.4   CO2  29  27   BUN  23  18   CR  0.72  0.59   GLC  91  117*     GFR Estimate   Date Value Ref Range Status   01/29/2018 >60 >60 mL/min/1.73m2 Final   11/06/2017 >60 >60 mL/min/1.73m2 Final   11/03/2017 53 (L) >60 mL/min/1.73m2 Final   08/24/2017 >60 >60 ml/min/1.73m2 Final   04/10/2017 >60 >60 ml/min/1.73m2 Final     ASSESSMENT/PLAN  (G40.909) Seizure disorder (H)  (primary encounter diagnosis)  Comment: Chronic, but no recent seizures.  Plan: Continue Keppra and monitoring.    (M81.8) Other osteoporosis without current pathological fracture  Comment: Chronic  Plan: Monitor for fx's.  Continue chondroiton.     (I10) Benign essential hypertension  Comment: BP goal; <150/90, at goal  Plan: Continue current POC.  Check BMP q6m or with acute change in condition    (M15.0) Primary osteoarthritis involving multiple joints  Comment: Chronic, but denies any pain.  Plan: Decrease tylenol to bid and monitor for signs of pain.    (I73.9) PVD (peripheral vascular disease) (H)  Comment: Chronic  Plan: Continue ASA and monitoring of pedal skin.    Electronically signed by:  RAFAEL Flowers CNP

## 2018-03-19 ENCOUNTER — NURSING HOME VISIT (OUTPATIENT)
Dept: GERIATRICS | Facility: CLINIC | Age: 83
End: 2018-03-19
Payer: MEDICARE

## 2018-03-19 DIAGNOSIS — G40.909 SEIZURE DISORDER (H): ICD-10-CM

## 2018-03-19 DIAGNOSIS — F03.91 DEMENTIA WITH BEHAVIORAL DISTURBANCE, UNSPECIFIED DEMENTIA TYPE: Primary | ICD-10-CM

## 2018-03-21 NOTE — PROGRESS NOTES
Pt was seen for a regulatory LTC visit  Case reviewed with NP    Pt's status has been stable  There have been no recent seizures  She remains resistant to cares    VSS  Alert, in NAD, suspicious, permits a limited exam  Lungs clear  CV irrreg  Abd soft  No LE edema  No tremors      Assessment    Dementia, advanced  Seizure d/o, stable on Keppra   HTN, stable on Metoprolol  Chronic afib, HR controlled    Plan  Continue current tx

## 2018-04-01 ENCOUNTER — TELEPHONE (OUTPATIENT)
Dept: GERIATRICS | Facility: CLINIC | Age: 83
End: 2018-04-01

## 2018-04-02 ENCOUNTER — NURSING HOME VISIT (OUTPATIENT)
Dept: GERIATRICS | Facility: CLINIC | Age: 83
End: 2018-04-02
Payer: MEDICARE

## 2018-04-02 VITALS
WEIGHT: 112 LBS | SYSTOLIC BLOOD PRESSURE: 142 MMHG | HEIGHT: 61 IN | BODY MASS INDEX: 21.14 KG/M2 | RESPIRATION RATE: 20 BRPM | HEART RATE: 76 BPM | DIASTOLIC BLOOD PRESSURE: 72 MMHG | TEMPERATURE: 97.4 F

## 2018-04-02 DIAGNOSIS — M24.60 FROZEN JOINT: Primary | ICD-10-CM

## 2018-04-02 DIAGNOSIS — E44.0 MODERATE PROTEIN-CALORIE MALNUTRITION (H): ICD-10-CM

## 2018-04-02 DIAGNOSIS — F03.91 DEMENTIA WITH BEHAVIORAL DISTURBANCE, UNSPECIFIED DEMENTIA TYPE: ICD-10-CM

## 2018-04-02 PROCEDURE — 99309 SBSQ NF CARE MODERATE MDM 30: CPT | Performed by: NURSE PRACTITIONER

## 2018-04-02 NOTE — TELEPHONE ENCOUNTER
"Since this afternoon, L middle finger \"tight, unable to open, c/o not being able to move it\". Not swollen, only painful if attempt to open it.  No redness, swelling, bruising.  No injury reported.  Nurse reports it is fixed in flexion against palm.  Pt sleeping  A/ Possible trigger finger with flexor tendon nodule preventing extension, doubt fracture or other acute injury.  P/ Recommend follow up with primary team this week.  Consider consultation with Ortho PA.      Electronically signed by Mary Saucedo  on April 1, 2018 8:18 PM    "

## 2018-04-02 NOTE — PROGRESS NOTES
"[place] [facility]    Chief Complaint   Patient presents with     Nursing Home Acute     HPI:    Yeni Pérez is a 89 year old  (2/9/1927), who is being seen today for an episodic care visit at [facility].  HPI information obtained from: facility chart records, facility staff, patient report and [facility] chart review. Pt is unreliable historian due to advanced dementia. Today's concern is:  Frozen joint, left 3rd finger  Nsg reported on 4/1 that pt's left third finger was frozen in a bent position.  Pt stated that she \"wok up\" with it this way.  Denies pain at rest.  No obvious injury.    Dementia with behavioral disturbance, unspecified dementia type  Advancing dementia and resists most care interventions including bathing and changing of clothes.      Moderate protein-calorie malnutrition (H)  Weight has been stable since admission to the skilled nsg unit and BMI is improved to 21.  Eats well, but eats when she wants to.    ALLERGIES: Boniva [ibandronic acid]; Food; Lexapro [escitalopram]; No clinical screening - see comments; and Zoloft [sertraline]  Past Medical, Surgical, Family and Social History reviewed and updated in [facility].    Current Outpatient Prescriptions   Medication Sig Dispense Refill     Acetaminophen 325 MG CAPS Take 650 mg by mouth 2 times daily 100 capsule      aspirin 81 MG EC tablet Take 1 tablet (81 mg) by mouth daily 90 tablet 3     METOPROLOL TARTRATE PO Take 12.5 mg by mouth daily       ACETAMINOPHEN PO Take 650 mg by mouth every 4 hours as needed for pain       bisacodyl (DULCOLAX) 10 MG Suppository Place 10 mg rectally every 3 days       magnesium hydroxide (MILK OF MAGNESIA) 400 MG/5ML suspension Take 30 mLs by mouth daily as needed for constipation or heartburn        senna-docusate (SENOKOT-S;PERICOLACE) 8.6-50 MG per tablet Take 2 tablets by mouth At Bedtime And 2 tabs daily prn       docusate sodium (COLACE) 100 MG capsule Take 1 capsule (100 " mg) by mouth every other day 60 capsule      emollient (VANICREAM) cream Apply topically 2 times daily       Misc Natural Products (GLUCOSAMINE CHOND COMPLEX/MSM PO) Take 1 tablet by mouth daily       allopurinol (ZYLOPRIM) 300 MG tablet Take 300 mg by mouth daily       levETIRAcetam (KEPPRA) 500 MG tablet Take 500 mg by mouth daily       Medications reviewed:  Medications reconciled to facility chart and changes were made to reflect current medications as identified as above med list. Below are the changes that were made:   Medications stopped since last EPIC medication reconciliation:   There are no discontinued medications.    Medications started since last [facility] medication reconciliation:  No orders of the defined types were placed in this encounter.    Patient Active Problem List   Diagnosis     History of hepatitis A  in 1980     History of hepatitis B in 1980     Cyst on ear, left, 2/25/2013     H/O squamous cell carcinoma of skin, right hand, 11/28/2011     Ganglion cyst, 1st MTP left 3/25/2009     Seizure disorder (H)     H/O renal calculi, 1987     PVC's (premature ventricular contractions)     H/O small bowel obstruction, lysis of adhesions, 1970     Osteoporosis, Bone Density 2013: Femeral neck: -2.6 (L) and -2.3 (R)     Cardiomegaly     Diverticulosis of large intestine without hemorrhage     Bilateral dry eyes     Heart murmur     Ataxia     Chronic fatigue     Benign essential hypertension     Primary osteoarthritis involving multiple joints     PVD (peripheral vascular disease) (H)     Abnormal mammogram (3./12/2015: 4mm grouping of calcificaions in the posterior central right breast)     Advance Care Planning     Low weight     External hemorrhoids     Chronic atrial fibrillation (H)     Dermatitis due to unknown cause, scalp     Dislocated shoulder, right, sequela     Slow transit constipation     Protein-calorie malnutrition (H)     Dementia with behavioral disturbance, unspecified dementia type  "    Frozen joint, left 3rd finger     REVIEW OF SYSTEMS:  Unobtainable secondary to cognitive impairment, but today pt reports no pain or concern until I told her that I wanted to see her about her finger and then recalled the issue.     Physical Exam:  /72  Pulse 76  Temp 97.4  F (36.3  C)  Resp 20  Ht 5' 1\" (1.549 m)  Wt 112 lb (50.8 kg)  BMI 21.16 kg/m2  GENERAL APPEARANCE: Alert, disheveled female seen.  Pale and confused. Able to respond verbally. Poor recall of any recent past events. Increased body odor .    HEAD: Normocephalic. No facial asymmetry.     EYES: conjunctiva and lids normal, Wearing eyeglasses.  RESP: Quiet, effortless respirations. No cough. CTA bilaterally.  CV: Irregular rhythm, Grade 2/6 cardiac murmur. No LE edema..  ABDOMEN: Soft rounded abdomen. BS's positive all 4 quadrants. No tenderness with palpation. No guarding.   M/S:. Able to open all five fingers of right hand.  Left hand third finger is triggered in frozen position with swelling of 1st metacarpal joint.  Unable to open finger manually.  Warm water applied to joint with slight ability to stretch the joint slightly. Limited ROM of right shoulder (h/o dislocation).     NEURO: Oriented to person only.  Numerous repetitive statements. No tremors  .  PSYCH: Suspicious of examiner and exam. Confused, and would ask why exam was being done.     Recent Labs:   CBC RESULTS:   Recent Labs   Lab Test 01/29/18 11/06/17 11/03/17  02/13/15   WBC   --   7.4  11.2*   < >  7.2   RBC   --   3.59*  3.49*   < >  3.76*   HGB  12.9  11.9  11.8   < >  12.5   HCT   --   37.3  36.4   < >  38.5   MCV   --   103.9*  104.3*   < >  102*   MCH   --    --    --    --   33.2*   MCHC   --    --    --    --   32.6   RDW   --   13.2  13.4   < >  13.8   PLT   --   342  243   < >  223    < > = values in this interval not displayed.       Last Basic Metabolic Panel:  Recent Labs   Lab Test 01/29/18 11/06/17   NA  141  142   POTASSIUM  4.3  4.0   CHLORIDE  " 104  105   BRIJSEH  9.6  9.4   CO2  29  27   BUN  23  18   CR  0.72  0.59   GLC  91  117*     GFR Estimate   Date Value Ref Range Status   01/29/2018 >60 >60 mL/min/1.73m2 Final   11/06/2017 >60 >60 mL/min/1.73m2 Final   11/03/2017 53 (L) >60 mL/min/1.73m2 Final   08/24/2017 >60 >60 ml/min/1.73m2 Final   04/10/2017 >60 >60 ml/min/1.73m2 Final       Liver Function Studies -   Recent Labs   Lab Test 10/17/16 02/13/15   PROTTOTAL  5.9  6.3   ALBUMIN  3.4  4.3   BILITOTAL  1.4  1.1   ALKPHOS  68  72   AST  21  23   ALT  13  16       TSH   Date Value Ref Range Status   11/12/2015 1.33 mcU/mL Final       Family Communication:  Message left for daughter Nellie to call to discuss status.    Assessment/Plan:  (M24.60) Frozen joint, left 3rd finger  (primary encounter diagnosis)  Comment: Acute, unclear if she would cooperate with orthopedic intervention  Plan: Occupational Therapy contacted and order given to eval and tx.  Unclear if paraffin tx's would be helpful.  Pending discussion with family, would consider referral to orthopedics, if she would cooperate, which is unlikely.    (F03.91) Dementia with behavioral disturbance, unspecified dementia type  Comment: Advanced  Plan: Continue POC on secure memory care unit.    (E44.0) Moderate protein-calorie malnutrition (H)  Comment: Improved  Plan: Continue POC and monitoring.     Electronically signed by  RAFAEL Flowers CNP

## 2018-04-03 ENCOUNTER — TELEPHONE (OUTPATIENT)
Dept: GERIATRICS | Facility: CLINIC | Age: 83
End: 2018-04-03

## 2018-04-03 NOTE — TELEPHONE ENCOUNTER
Daughter Nellie contacted and updated about mother's frozen trigger finger and issues regarding intervention.  Yeni is resistive to most care interventions due to her dementia.  If she had a surgical correction, it would be likely that she would not cooperate with post operative care, increasing the complication risk.  Currently I have asked Occupational Therapy and arthritis center head to evaluate her and determine if any therapy would be of benefit.  Nellie expressed understanding and agreement with this plan.

## 2018-05-09 ENCOUNTER — NURSING HOME VISIT (OUTPATIENT)
Dept: GERIATRICS | Facility: CLINIC | Age: 83
End: 2018-05-09
Payer: MEDICARE

## 2018-05-09 VITALS
HEIGHT: 61 IN | RESPIRATION RATE: 20 BRPM | WEIGHT: 110 LBS | HEART RATE: 78 BPM | SYSTOLIC BLOOD PRESSURE: 136 MMHG | DIASTOLIC BLOOD PRESSURE: 72 MMHG | BODY MASS INDEX: 20.77 KG/M2 | TEMPERATURE: 97.5 F

## 2018-05-09 DIAGNOSIS — I48.20 CHRONIC ATRIAL FIBRILLATION (H): ICD-10-CM

## 2018-05-09 DIAGNOSIS — I10 BENIGN ESSENTIAL HYPERTENSION: ICD-10-CM

## 2018-05-09 DIAGNOSIS — K59.01 SLOW TRANSIT CONSTIPATION: ICD-10-CM

## 2018-05-09 DIAGNOSIS — F03.91 DEMENTIA WITH BEHAVIORAL DISTURBANCE, UNSPECIFIED DEMENTIA TYPE: ICD-10-CM

## 2018-05-09 DIAGNOSIS — M24.60 FROZEN JOINT: ICD-10-CM

## 2018-05-09 DIAGNOSIS — G40.909 SEIZURE DISORDER (H): Primary | ICD-10-CM

## 2018-05-09 DIAGNOSIS — M81.6 LOCALIZED OSTEOPOROSIS WITHOUT CURRENT PATHOLOGICAL FRACTURE: ICD-10-CM

## 2018-05-09 DIAGNOSIS — I73.9 PVD (PERIPHERAL VASCULAR DISEASE) (H): ICD-10-CM

## 2018-05-09 DIAGNOSIS — Z87.442 H/O RENAL CALCULI: ICD-10-CM

## 2018-05-09 PROCEDURE — 99309 SBSQ NF CARE MODERATE MDM 30: CPT | Performed by: NURSE PRACTITIONER

## 2018-05-09 RX ORDER — BISACODYL 10 MG
10 SUPPOSITORY, RECTAL RECTAL
Qty: 30 SUPPOSITORY
Start: 2018-05-09 | End: 2019-01-01

## 2018-05-09 NOTE — PROGRESS NOTES
[facility]    Chief Complaint   Patient presents with     senior living Regulatory     [place] Medical Record Number:  [id number]    HPI:    Yeni Pérez is a 89 year old  (2/9/1927), who is being seen today for a federally mandated E/M visit at [facility].  HPI information obtained from: facility chart records, facility staff, patient report and [place] Epic chart review. Pt is unreliable historian due to cognitive loss. Today's concerns are:  Seizure disorder (H)  No reports of obvious seizure activity.  Remains on daily Keppra.    Slow transit constipation  Pt self toilets and due to her severe short term memory loss, is not reliable to report BM's, but staff have been able to report BM's on most days.    H/O renal calculi, 1987  Remains on allopurinal.  No reports of back pain or fever.    Benign essential hypertension  BP ranges in past month: 122-136/68-78.  No reports of obvious chest pain.    PVD (peripheral vascular disease) (H)  No reports of pedal wounds.    Chronic atrial fibrillation (H)  Remains on metoprolol daily for rate control.  Pulse ranges in past month: 64-78.  On ASA for anticoagulation.  Is not a candidate for coumadin due to her fall risk.    Localized osteoporosis without current pathological fracture  Remains on glucosomine.  Resistant to taking meds, so no calcium at this time.    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  Would need to see orthopedics for resolution and family is opting to manage conservatively at the NH.  Pt denies pain at rest.    Dementia  Pt continues to reside on secure memory care unit.  Resistive to many care interventions.  Daughter has agreed to assist with showers, so pt now is having a shower weekly.  No recent acute illness.  Body weight remains low but stable.  BMI is 20.    ALLERGIES: Boniva [ibandronic acid]; Food; Lexapro [escitalopram]; No clinical screening - see comments; and  Zoloft [sertraline]  PAST MEDICAL HISTORY:  has a past medical history of Abnormal mammogram (3./12/2015: 4mm grouping of calcificaions in the posterior central right breast) (11/9/2015); Advanced directives, counseling/discussion, POLST completed 11/9/15, DNR/DNI (11/9/2015); Ataxia (11/9/2015); Benign essential hypertension (11/9/2015); Bilateral dry eyes (11/9/2015); Cardiomegaly (11/9/2015); Chronic atrial fibrillation (H) (4/10/2017); Chronic fatigue (11/9/2015); Cyst on ear, left, 2/25/2013 (11/9/2015); Dementia with behavioral disturbance, unspecified dementia type (1/25/2018); Dermatitis due to unknown cause, scalp (6/19/2017); Dislocated shoulder, right, sequela (11/3/2017); Diverticulosis of large intestine without hemorrhage (11/9/2015); External hemorrhoids (11/14/2016); Frozen joint, left 3rd finger (4/2/2018); Ganglion cyst, 1st MTP left 3/25/2009 (11/9/2015); H/O renal calculi, 1987 (11/9/2015); H/O small bowel obstruction, lysis of adhesions, 1970 (11/9/2015); H/O squamous cell carcinoma of skin, right hand, 11/28/2011 (11/9/2015); Heart murmur (11/9/2015); History of hepatitis A  in 1980 (11/5/2015); History of hepatitis B in 1980 (11/5/2015); Low weight (12/4/2015); Major depression in complete remission (H) (11/9/2015); Osteoporosis, Bone Density 2013: Femeral neck: -2.6 (L) and -2.3 (R) (11/9/2015); Primary osteoarthritis involving multiple joints (11/9/2015); PVC's (premature ventricular contractions) (11/9/2015); PVD (peripheral vascular disease) (H) (11/9/2015); Seizure disorder (H) (11/9/2015); Senile dementia without behavioral disturbance (12/4/2015); Shingles; and Slow transit constipation (11/20/2017).  PAST SURGICAL HISTORY:  has a past surgical history that includes appendectomy; Cystectomy ovarian benign; LAP,LYSIS OF ADHESIONS; Bunionectomy; and cataract iol, rt/lt.  FAMILY HISTORY: family history includes Colon Cancer in her mother; Depression in her daughter; Leukemia in her  brother; Myocardial Infarction in her father; Ovarian Cancer in her sister.  SOCIAL HISTORY:  reports that she has quit smoking. She does not have any smokeless tobacco history on file. She reports that she drinks alcohol.    MEDICATIONS:  Current Outpatient Prescriptions   Medication Sig Dispense Refill     ACETAMINOPHEN PO Take 650 mg by mouth every 4 hours as needed for pain       allopurinol (ZYLOPRIM) 300 MG tablet Take 300 mg by mouth daily       aspirin 81 MG EC tablet Take 1 tablet (81 mg) by mouth daily 90 tablet 3     bisacodyl (DULCOLAX) 10 MG Suppository Place 1 suppository (10 mg) rectally every 3 days prn 30 suppository      docusate sodium (COLACE) 100 MG capsule Take 1 capsule (100 mg) by mouth every other day 60 capsule      emollient (VANICREAM) cream Apply topically 2 times daily       levETIRAcetam (KEPPRA) 500 MG tablet Take 500 mg by mouth daily       magnesium hydroxide (MILK OF MAGNESIA) 400 MG/5ML suspension Take 30 mLs by mouth daily as needed for constipation or heartburn        METOPROLOL TARTRATE PO Take 12.5 mg by mouth daily       Misc Natural Products (GLUCOSAMINE CHOND COMPLEX/MSM PO) Take 1 tablet by mouth daily       senna-docusate (SENOKOT-S;PERICOLACE) 8.6-50 MG per tablet Take 2 tablets by mouth At Bedtime And 2 tabs daily prn       Medications reviewed:  Medications reconciled to facility chart and changes were made to reflect current medications as identified as above med list. Below are the changes that were made:   Medications stopped since last EPIC medication reconciliation:   There are no discontinued medications.    Medications started since last [facility] medication reconciliation:  No orders of the defined types were placed in this encounter.    Patient Active Problem List   Diagnosis     History of hepatitis A  in 1980     History of hepatitis B in 1980     Cyst on ear, left, 2/25/2013     H/O squamous cell carcinoma of skin, right hand, 11/28/2011     Ganglion cyst, 1st  "MTP left 3/25/2009     Seizure disorder (H)     H/O renal calculi, 1987     PVC's (premature ventricular contractions)     H/O small bowel obstruction, lysis of adhesions, 1970     Osteoporosis, Bone Density 2013: Femeral neck: -2.6 (L) and -2.3 (R)     Cardiomegaly     Diverticulosis of large intestine without hemorrhage     Bilateral dry eyes     Heart murmur     Ataxia     Chronic fatigue     Benign essential hypertension     Primary osteoarthritis involving multiple joints     PVD (peripheral vascular disease) (H)     Abnormal mammogram (3./12/2015: 4mm grouping of calcificaions in the posterior central right breast)     Advance Care Planning     Low weight     External hemorrhoids     Chronic atrial fibrillation (H)     Dermatitis due to unknown cause, scalp     Dislocated shoulder, right, sequela     Slow transit constipation     Protein-calorie malnutrition (H)     Dementia with behavioral disturbance, unspecified dementia type     Frozen joint, left 3rd finger     Case Management:  I have reviewed the care plan and MDS and do agree with the plan. Patient's desire to return to the community is not assessible due to cognitive impairment.  Information reviewed:  Medications, vital signs, orders, and nursing notes.    ROS:  Unobtainable secondary to cognitive impairment, but today pt reports no pain or concern.     Exam:  Vitals: /72  Pulse 78  Temp 97.5  F (36.4  C)  Resp 20  Ht 5' 1\" (1.549 m)  Wt 110 lb (49.9 kg)  BMI 20.78 kg/m2  BMI= Body mass index is 20.78 kg/(m^2).  GENERAL APPEARANCE: Alert, female seen.  Pale and confused. Able to respond verbally. Poor recall of any recent past events.     HEAD: Normocephalic. No facial asymmetry.     EYES: conjunctiva and lids normal, Wearing eyeglasses.  RESP: Quiet, effortless respirations. No cough. CTA bilaterally.  CV: Irregular rhythm, Grade 2/6 cardiac murmur. No LE edema..  ABDOMEN: Soft rounded abdomen. BS's positive all 4 quadrants. No " tenderness with palpation. No guarding.   M/S:. Able to open all five fingers of right hand.  Left hand third finger is triggered in frozen position with swelling of 1st metacarpal joint.  Unable to open finger manually. Limited ROM of right shoulder (h/o dislocation).    SKIN:  No pedal wounds.  Skin warm and dry.   NEURO: Oriented to person only.  Numerous repetitive statements. No tremors  .  PSYCH: Suspicious of examiner and exam. Confused, and would ask why exam was being done.       Lab/Diagnostic data:   CBC RESULTS:   Recent Labs   Lab Test 01/29/18 11/06/17 11/03/17  02/13/15   WBC   --   7.4  11.2*   < >  7.2   RBC   --   3.59*  3.49*   < >  3.76*   HGB  12.9  11.9  11.8   < >  12.5   HCT   --   37.3  36.4   < >  38.5   MCV   --   103.9*  104.3*   < >  102*   MCH   --    --    --    --   33.2*   MCHC   --    --    --    --   32.6   RDW   --   13.2  13.4   < >  13.8   PLT   --   342  243   < >  223    < > = values in this interval not displayed.       Last Basic Metabolic Panel:  Recent Labs   Lab Test 01/29/18 11/06/17   NA  141  142   POTASSIUM  4.3  4.0   CHLORIDE  104  105   BRIJESH  9.6  9.4   CO2  29  27   BUN  23  18   CR  0.72  0.59   GLC  91  117*     GFR Estimate   Date Value Ref Range Status   01/29/2018 >60 >60 mL/min/1.73m2 Final   11/06/2017 >60 >60 mL/min/1.73m2 Final   11/03/2017 53 (L) >60 mL/min/1.73m2 Final   08/24/2017 >60 >60 ml/min/1.73m2 Final   04/10/2017 >60 >60 ml/min/1.73m2 Final     ASSESSMENT/PLAN  (G40.909) Seizure disorder (H)  (primary encounter diagnosis)  Comment: Asymptomatic  Plan: Continue current dose of Keppra and monitoring.  Check LFT's next lab day.    (K59.01) Slow transit constipation  Comment: Chronic, but appears controlled  Plan: Continue current POC.    (Z87.442) H/O renal calculi, 1987  Comment: Asymptomatic  Plan: Continue allopurinal and monitoring.    (I10) Benign essential hypertension  Comment: Chronic, BP goal: <150/90, at goal  Plan: Continue current POC.   BMP next lab day.    (I73.9) PVD (peripheral vascular disease) (H)  Comment: Chronic  Plan: Continue daily ASA and monitoring.    (I48.2) Chronic atrial fibrillation (H)  Comment: Rate controlled  Plan: Continue metoprolol for rate control and ASA for anticoagulation.    (M81.6) Localized osteoporosis without current pathological fracture  Comment: Chronic  Plan: Encourage intake of dairy,    (M24.60) Frozen joint, left 3rd finger  Comment: Ongoing  Plan: Monitor.  Refer to orthopedics if family desires.    (F03.91) Dementia with behavioral disturbance, unspecified dementia type  Comment: Avancing  Plan: Continue POC on memory care unit.  Anticipate further progression of dementia.     Electronically signed by:  RAFAEL Flowers CNP

## 2018-05-10 ENCOUNTER — TRANSFERRED RECORDS (OUTPATIENT)
Dept: HEALTH INFORMATION MANAGEMENT | Facility: CLINIC | Age: 83
End: 2018-05-10

## 2018-05-10 LAB
ALBUMIN SERPL-MCNC: 3.5 G/DL (ref 3.4–5)
ALP SERPL-CCNC: 96 U/L (ref 40–150)
ALT SERPL-CCNC: <10 U/L (ref 9–55)
AST SERPL-CCNC: 15 U/L (ref 10–40)
BILIRUB SERPL-MCNC: 1.1 MG/DL (ref 0.2–1.2)
BUN SERPL-MCNC: 18 MG/DL (ref 9–26)
CALCIUM SERPL-MCNC: 9.1 MG/DL (ref 8.4–10.4)
CHLORIDE SERPLBLD-SCNC: 105 MMOL/L (ref 98–109)
CO2 SERPL-SCNC: 28 MMOL/L (ref 22–31)
CREAT SERPL-MCNC: 0.67 MG/DL (ref 0.55–1.02)
GFR SERPL CREATININE-BSD FRML MDRD: >60 ML/MIN/1.73M2
GLUCOSE SERPL-MCNC: 89 MG/DL (ref 70–100)
HEMOGLOBIN: 12.8 G/DL (ref 11.8–15.5)
POTASSIUM SERPL-SCNC: 4.2 MMOL/L (ref 3.5–5.2)
PROT SERPL-MCNC: 6.2 G/DL (ref 6.4–8.3)
SODIUM SERPL-SCNC: 143 MMOL/L (ref 136–145)

## 2018-07-09 VITALS
WEIGHT: 114 LBS | RESPIRATION RATE: 18 BRPM | HEART RATE: 70 BPM | BODY MASS INDEX: 21.52 KG/M2 | TEMPERATURE: 98 F | HEIGHT: 61 IN | DIASTOLIC BLOOD PRESSURE: 71 MMHG | SYSTOLIC BLOOD PRESSURE: 145 MMHG

## 2018-07-09 NOTE — PROGRESS NOTES
[facility]    Chief Complaint   Patient presents with     RECHECK       [place] Medical Record Number:  [id number]    HPI:    Yeni Pérez is a 89 year old  (2/9/1927), who is being seen today for an episodic care visit at [facility].  HPI information obtained from: facility chart records, facility staff, patient report and [facility] chart review. Pt is unreliable historian due to cognitive loss. Today's concern is:  Seizure disorder (H)  No reports of obvious recent seizures.  Remains on low dose Keppra.    Chronic atrial fibrillation (H)  No reports of heart rates >100/min or less than 60/min.  Remains on metoprolol and ASA.    Moderate protein-calorie malnutrition (H)  Weight is low but stable.    Dementia with behavioral disturbance, unspecified dementia type  Remains on memory care unit.  Can be very resistive to attempts to provide care.  Daughter assists with showers.    ALLERGIES: Boniva [ibandronic acid]; Food; Lexapro [escitalopram]; No clinical screening - see comments; and Zoloft [sertraline]  Past Medical, Surgical, Family and Social History reviewed and updated in [facility].    Current Outpatient Prescriptions   Medication Sig Dispense Refill     ACETAMINOPHEN PO Take 650 mg by mouth every 4 hours as needed for pain       allopurinol (ZYLOPRIM) 300 MG tablet Take 300 mg by mouth daily       aspirin 81 MG EC tablet Take 1 tablet (81 mg) by mouth daily 90 tablet 3     bisacodyl (DULCOLAX) 10 MG Suppository Place 1 suppository (10 mg) rectally every 3 days prn 30 suppository      docusate sodium (COLACE) 100 MG capsule Take 1 capsule (100 mg) by mouth every other day 60 capsule      emollient (VANICREAM) cream Apply topically 2 times daily       levETIRAcetam (KEPPRA) 500 MG tablet Take 500 mg by mouth daily       magnesium hydroxide (MILK OF MAGNESIA) 400 MG/5ML suspension Take 30 mLs by mouth daily as needed for constipation or heartburn        METOPROLOL  TARTRATE PO Take 12.5 mg by mouth daily       [facility] [place] [place] Natural Products (GLUCOSAMINE CHOND COMPLEX/MSM PO) Take 1 tablet by mouth daily       senna-docusate (SENOKOT-S;PERICOLACE) 8.6-50 MG per tablet Take 2 tablets by mouth At Bedtime And 2 tabs daily prn       Medications reviewed:  Medications reconciled to facility chart and changes were made to reflect current medications as identified as above med list. Below are the changes that were made:   Medications stopped since last EPIC medication reconciliation:   There are no discontinued medications.    Medications started since last [facility] medication reconciliation:  No orders of the defined types were placed in this encounter.    Patient Active Problem List   Diagnosis     History of hepatitis A  in 1980     History of hepatitis B in 1980     Cyst on ear, left, 2/25/2013     H/O squamous cell carcinoma of skin, right hand, 11/28/2011     Ganglion cyst, 1st MTP left 3/25/2009     Seizure disorder (H)     H/O renal calculi, 1987     PVC's (premature ventricular contractions)     H/O small bowel obstruction, lysis of adhesions, 1970     Osteoporosis, Bone Density 2013: Femeral neck: -2.6 (L) and -2.3 (R)     Cardiomegaly     Diverticulosis of large intestine without hemorrhage     Bilateral dry eyes     Heart murmur     Ataxia     Chronic fatigue     Benign essential hypertension     Primary osteoarthritis involving multiple joints     PVD (peripheral vascular disease) (H)     Abnormal mammogram (3./12/2015: 4mm grouping of calcificaions in the posterior central right breast)     Advance Care Planning     Low weight     External hemorrhoids     Chronic atrial fibrillation (H)     Dermatitis due to unknown cause, scalp     Dislocated shoulder, right, sequela     Slow transit constipation     Protein-calorie malnutrition (H)     Dementia with behavioral disturbance, unspecified dementia type     Frozen joint, left 3rd finger     REVIEW OF SYSTEMS:  Unobtainable secondary to  "cognitive impairment, but today pt reports no pain or concern.       Physical Exam:  /71  Pulse 70  Temp 98  F (36.7  C)  Resp 18  Ht 5' 1\" (1.549 m)  Wt 114 lb (51.7 kg)  BMI 21.54 kg/m2  GENERAL APPEARANCE: Alert, female seen.  Pale and confused. Able to respond verbally. Poor recall of any recent past events.     HEAD: Normocephalic. No facial asymmetry.     EYES: conjunctiva and lids normal, Wearing eyeglasses.  RESP: Quiet, effortless respirations. No cough. CTA bilaterally.  CV: Irregular rhythm, Grade 2/6 cardiac murmur. No LE edema..  ABDOMEN: Soft rounded abdomen. BS's positive all 4 quadrants. No tenderness with palpation. No guarding.   M/S:. Able to open all five fingers of right hand.  Left hand third finger is triggered in frozen position with swelling of 1st metacarpal joint.  Unable to open finger manually. Limited ROM of right shoulder (h/o dislocation).    SKIN:  Several right forehead lesions that she has opened up by picking, which is baseline behavior for her..   NEURO: Oriented to person only.  Numerous repetitive statements. No tremors  .  PSYCH: Suspicious of examiner and exam. Confused, and would ask why exam was being done.       Recent Labs:   CBC RESULTS:   Recent Labs   Lab Test 05/10/18 01/29/18 11/06/17 11/03/17  02/13/15   WBC   --    --   7.4  11.2*   < >  7.2   RBC   --    --   3.59*  3.49*   < >  3.76*   HGB  12.8  12.9  11.9  11.8   < >  12.5   HCT   --    --   37.3  36.4   < >  38.5   MCV   --    --   103.9*  104.3*   < >  102*   MCH   --    --    --    --    --   33.2*   MCHC   --    --    --    --    --   32.6   RDW   --    --   13.2  13.4   < >  13.8   PLT   --    --   342  243   < >  223    < > = values in this interval not displayed.       Last Basic Metabolic Panel:  Recent Labs   Lab Test 05/10/18 01/29/18   NA  143  141   POTASSIUM  4.2  4.3   CHLORIDE  105  104   BRIJESH  9.1  9.6   CO2  28  29   BUN  18  23   CR  0.67  0.72   GLC  89  91     GFR Estimate   Date " Value Ref Range Status   05/10/2018 >60 >60 ml/min/1.73m2 Final   01/29/2018 >60 >60 mL/min/1.73m2 Final   11/06/2017 >60 >60 mL/min/1.73m2 Final   11/03/2017 53 (L) >60 mL/min/1.73m2 Final   08/24/2017 >60 >60 ml/min/1.73m2 Final       Liver Function Studies -   Recent Labs   Lab Test 05/10/18 10/17/16   PROTTOTAL  6.2*  5.9   ALBUMIN  3.5  3.4   BILITOTAL  1.1  1.4   ALKPHOS  96  68   AST  15  21   ALT  <10  13     Assessment/Plan:  (G40.909) Seizure disorder (H)  (primary encounter diagnosis)  Comment: No obvious evidence of seizures  Plan: Continue Keppra and monitor.  Dr. Garcia to see in two weeks.    (I48.2) Chronic atrial fibrillation (H)  Comment: Chronic, rate controlled  Plan: Continue metoprolol and ASA.  Is not a coumadin candidate due to fall risk.    (E44.0) Moderate protein-calorie malnutrition (H)  Comment: Chronic, but stable in skilled nsg facility  Plan: Continue current POC.    (F03.91) Dementia with behavioral disturbance, unspecified dementia type  Comment: Advancing  Plan: Continue POC on secure memory care unit.  Monitor for expected progression.    Electronically signed by  RAFAEL Flowers CNP

## 2018-07-10 ENCOUNTER — NURSING HOME VISIT (OUTPATIENT)
Dept: GERIATRICS | Facility: CLINIC | Age: 83
End: 2018-07-10
Payer: MEDICARE

## 2018-07-10 DIAGNOSIS — E44.0 MODERATE PROTEIN-CALORIE MALNUTRITION (H): ICD-10-CM

## 2018-07-10 DIAGNOSIS — I48.20 CHRONIC ATRIAL FIBRILLATION (H): ICD-10-CM

## 2018-07-10 DIAGNOSIS — G40.909 SEIZURE DISORDER (H): Primary | ICD-10-CM

## 2018-07-10 DIAGNOSIS — F03.91 DEMENTIA WITH BEHAVIORAL DISTURBANCE, UNSPECIFIED DEMENTIA TYPE: ICD-10-CM

## 2018-07-10 PROCEDURE — 99309 SBSQ NF CARE MODERATE MDM 30: CPT | Performed by: NURSE PRACTITIONER

## 2018-07-23 ENCOUNTER — NURSING HOME VISIT (OUTPATIENT)
Dept: GERIATRICS | Facility: CLINIC | Age: 83
End: 2018-07-23
Payer: MEDICARE

## 2018-07-23 DIAGNOSIS — I48.20 CHRONIC ATRIAL FIBRILLATION (H): ICD-10-CM

## 2018-07-23 DIAGNOSIS — F03.91 DEMENTIA WITH BEHAVIORAL DISTURBANCE, UNSPECIFIED DEMENTIA TYPE: Primary | ICD-10-CM

## 2018-07-23 DIAGNOSIS — G40.909 SEIZURE DISORDER (H): ICD-10-CM

## 2018-07-26 NOTE — PROGRESS NOTES
Pt was seen for a regulatory LTC visit  Case reviewed with NP    Overall mental and functional status remain stable    Pt remains often resistive to cares  There have been no clinically evident seizures      VSS  BP stable  Pt is lying in bed, appears comfortable, is uncooperative with exam  Oriented to self  Facies symmetric  Lungs clear  CV irreg, HR 60s  Abd soft      Assessment    Dementia, advanced, stable    Chronic afib, HR controlled    Hx seizures, no recurrence, on Keppra    Plan   Continue current tx

## 2018-09-10 ENCOUNTER — NURSING HOME VISIT (OUTPATIENT)
Dept: GERIATRICS | Facility: CLINIC | Age: 83
End: 2018-09-10
Payer: MEDICARE

## 2018-09-10 VITALS
RESPIRATION RATE: 20 BRPM | WEIGHT: 116 LBS | HEART RATE: 70 BPM | HEIGHT: 61 IN | BODY MASS INDEX: 21.9 KG/M2 | DIASTOLIC BLOOD PRESSURE: 76 MMHG | SYSTOLIC BLOOD PRESSURE: 136 MMHG

## 2018-09-10 DIAGNOSIS — I10 BENIGN ESSENTIAL HYPERTENSION: ICD-10-CM

## 2018-09-10 DIAGNOSIS — G40.909 SEIZURE DISORDER (H): Primary | ICD-10-CM

## 2018-09-10 DIAGNOSIS — I73.9 PVD (PERIPHERAL VASCULAR DISEASE) (H): ICD-10-CM

## 2018-09-10 DIAGNOSIS — M15.0 PRIMARY OSTEOARTHRITIS INVOLVING MULTIPLE JOINTS: ICD-10-CM

## 2018-09-10 DIAGNOSIS — M81.0 OSTEOPOROSIS WITHOUT CURRENT PATHOLOGICAL FRACTURE, UNSPECIFIED OSTEOPOROSIS TYPE: ICD-10-CM

## 2018-09-10 DIAGNOSIS — F03.91 DEMENTIA WITH BEHAVIORAL DISTURBANCE, UNSPECIFIED DEMENTIA TYPE: ICD-10-CM

## 2018-09-10 DIAGNOSIS — E44.1 MILD PROTEIN-CALORIE MALNUTRITION (H): ICD-10-CM

## 2018-09-10 DIAGNOSIS — I48.20 CHRONIC ATRIAL FIBRILLATION (H): ICD-10-CM

## 2018-09-10 PROBLEM — E46 PROTEIN-CALORIE MALNUTRITION (H): Status: RESOLVED | Noted: 2018-01-25 | Resolved: 2018-09-10

## 2018-09-10 PROBLEM — S43.004S: Status: RESOLVED | Noted: 2017-11-03 | Resolved: 2018-09-10

## 2018-09-10 PROCEDURE — 99318 ZZC ANNUAL NURSING FAC ASSESSMNT, STABLE: CPT | Performed by: NURSE PRACTITIONER

## 2018-09-10 NOTE — PROGRESS NOTES
[place]  Chief Complaint   Patient presents with     Annual Comprehensive Nursing Home     [facility] Medical Record Number:  [id number]    HPI:    Yeni Pérez is a 89 year old  (2/9/1927), who is being seen today for an annual comprehensive visit at [facility] .  HPI information obtained from: facility chart records, facility staff, patient report and [facility] chart review. Pt is unreliable historian due to cognitive loss Today's concerns are:  Seizure disorder (H)  Remains on Keppra daily.  No reports of seizure activity.    Benign essential hypertension  BP ranges in past 2 weeks: 126/81 - 144/78.  No reports of chest pain.  No recent changes in BP med dose.    PVD (peripheral vascular disease) (H)  No reports of pedal wounds.  On ASA daily.    Chronic atrial fibrillation (H)  No reports of heart rates >100/min.  Remains on metoprolol for rate control.  Unable to take coumadin due to fall risk and is on ASA daily.    Mild protein-calorie malnutrition (H)  Slowly gaining weight.  BMI improved to 21.    Osteoporosis without current pathological fracture, unspecified osteoporosis type  No reports of recent fracture.    Primary osteoarthritis involving multiple joints  Other than pain from her left 3rd and 4th finger contractures, she denies any other orthopedic pain.    Dementia with behavioral disturbance, unspecified dementia type  Resides on memory care unit.  Relies primarily on w/c, with little ambulation.  No recent acute illness.    PHQ-9 score:    PHQ-9 SCORE 8/2/2018   Total Score 0       ALLERGIES: Boniva [ibandronic acid]; Food; Lexapro [escitalopram]; No clinical screening - see comments; and Zoloft [sertraline]  PROBLEM LIST:  Patient Active Problem List   Diagnosis     History of hepatitis A  in 1980     History of hepatitis B in 1980     Cyst on ear, left, 2/25/2013     H/O squamous cell carcinoma of skin, right hand, 11/28/2011     Ganglion cyst, 1st MTP left  3/25/2009     Seizure disorder (H)     H/O renal calculi, 1987     PVC's (premature ventricular contractions)     H/O small bowel obstruction, lysis of adhesions, 1970     Osteoporosis, Bone Density 2013: Femeral neck: -2.6 (L) and -2.3 (R)     Cardiomegaly     Diverticulosis of large intestine without hemorrhage     Bilateral dry eyes     Heart murmur     Ataxia     Chronic fatigue     Benign essential hypertension     Primary osteoarthritis involving multiple joints     PVD (peripheral vascular disease) (H)     Abnormal mammogram (3./12/2015: 4mm grouping of calcificaions in the posterior central right breast)     Advance Care Planning     Low weight     External hemorrhoids     Chronic atrial fibrillation (H)     Dermatitis due to unknown cause, scalp     Slow transit constipation     Protein-calorie malnutrition (H)     Dementia with behavioral disturbance, unspecified dementia type     Frozen joint, left 3rd finger     PAST MEDICAL HISTORY:  has a past medical history of Abnormal mammogram (3./12/2015: 4mm grouping of calcificaions in the posterior central right breast) (11/9/2015); Advanced directives, counseling/discussion, POLST completed 11/9/15, DNR/DNI (11/9/2015); Ataxia (11/9/2015); Benign essential hypertension (11/9/2015); Bilateral dry eyes (11/9/2015); Cardiomegaly (11/9/2015); Chronic atrial fibrillation (H) (4/10/2017); Chronic fatigue (11/9/2015); Cyst on ear, left, 2/25/2013 (11/9/2015); Dementia with behavioral disturbance, unspecified dementia type (1/25/2018); Dermatitis due to unknown cause, scalp (6/19/2017); Dislocated shoulder, right, sequela (11/3/2017); Diverticulosis of large intestine without hemorrhage (11/9/2015); External hemorrhoids (11/14/2016); Frozen joint, left 3rd finger (4/2/2018); Ganglion cyst, 1st MTP left 3/25/2009 (11/9/2015); H/O renal calculi, 1987 (11/9/2015); H/O small bowel obstruction, lysis of adhesions, 1970 (11/9/2015); H/O squamous cell carcinoma of skin,  right hand, 11/28/2011 (11/9/2015); Heart murmur (11/9/2015); History of hepatitis A  in 1980 (11/5/2015); History of hepatitis B in 1980 (11/5/2015); Low weight (12/4/2015); Major depression in complete remission (H) (11/9/2015); Osteoporosis, Bone Density 2013: Femeral neck: -2.6 (L) and -2.3 (R) (11/9/2015); Primary osteoarthritis involving multiple joints (11/9/2015); PVC's (premature ventricular contractions) (11/9/2015); PVD (peripheral vascular disease) (H) (11/9/2015); Seizure disorder (H) (11/9/2015); Senile dementia without behavioral disturbance (12/4/2015); Shingles; and Slow transit constipation (11/20/2017).  PAST SURGICAL HISTORY:  has a past surgical history that includes appendectomy; Cystectomy ovarian benign; LAP,LYSIS OF ADHESIONS; Bunionectomy; and cataract iol, rt/lt.  FAMILY HISTORY: family history includes Colon Cancer in her mother; Depression in her daughter; Leukemia in her brother; Myocardial Infarction in her father; Ovarian Cancer in her sister.  SOCIAL HISTORY:  reports that she has quit smoking. She does not have any smokeless tobacco history on file. She reports that she drinks alcohol.  IMMUNIZATIONS:  Most Recent Immunizations   Administered Date(s) Administered     Influenza (High Dose) 3 valent vaccine 11/09/2017     Influenza (IIV3) PF 10/24/2016     Pneumo Conj 13-V (2010&after) 12/02/2013     Pneumococcal 23 valent 03/08/2004     Tetanus 07/01/2014     Zoster vaccine, live 02/01/2007   Deferred Date(s) Deferred     TDAP Vaccine (Boostrix) 10/12/2017     Above immunizations pulled from [facility]. [facility] and facility records also reconciled. Outstanding information sent to  to update [employer]  Future immunizations needed:  yearly influenza per facility protocol  MEDICATIONS:  Current Outpatient Prescriptions   Medication Sig Dispense Refill     ACETAMINOPHEN PO Take 650 mg by mouth every 4 hours as needed for pain       allopurinol (ZYLOPRIM) 300 MG tablet  Take 300 mg by mouth daily       aspirin 81 MG EC tablet Take 1 tablet (81 mg) by mouth daily 90 tablet 3     bisacodyl (DULCOLAX) 10 MG Suppository Place 1 suppository (10 mg) rectally every 3 days prn 30 suppository      docusate sodium (COLACE) 100 MG capsule Take 1 capsule (100 mg) by mouth every other day 60 capsule      emollient (VANICREAM) cream Apply topically 2 times daily       levETIRAcetam (KEPPRA) 500 MG tablet Take 500 mg by mouth daily       magnesium hydroxide (MILK OF MAGNESIA) 400 MG/5ML suspension Take 30 mLs by mouth daily as needed for constipation or heartburn        METOPROLOL TARTRATE PO Take 12.5 mg by mouth daily       Misc Natural Products (GLUCOSAMINE CHOND COMPLEX/MSM PO) Take 1 tablet by mouth daily       senna-docusate (SENOKOT-S;PERICOLACE) 8.6-50 MG per tablet Take 2 tablets by mouth At Bedtime And 2 tabs daily prn       Medications reviewed:  Medications reconciled to facility chart and changes were made to reflect current medications as identified as above med list. Below are the changes that were made:   Medications stopped since last EPIC medication reconciliation:   There are no discontinued medications.    Medications started since last [facility] medication reconciliation:  No orders of the defined types were placed in this encounter.    Case Management:  I have reviewed the facility/SNF care plan/MDS which was done 8/2/2018, including the falls risk, nutrition and pain screening. I also reviewed the current immunizations, and preventive care..Future cancer screening is not clinically indicated secondary to age/goals of care Patient's desire to return to the community is not assessible due to cognitive impairment. Current Level of Care is appropriate.    Advance Directive Discussion:    I reviewed the current advanced directives as reflected in EPIC, the POLST and the facility chart, and verified the congruency of orders. I contacted the first party, daughter Nellie and discussed  "the plan of Care.  I did not due to cognitive impairment review the advance directives with the resident.     Team Discussion:  I communicated with the appropriate disciplines involved with the Plan of Care:   Nursing      Patient Goal:  Patient's goal is family's/responsible party's goal for the patient is DNR/DNI but to hospitalize for treatable illnesses..    Information reviewed:  Medications, vital signs, orders, and nursing notes.    ROS:  Unobtainable secondary to cognitive impairment, but today pt reports no pain or concern.     Exam:  /76  Pulse 70  Resp 20  Ht 5' 1\" (1.549 m)  Wt 116 lb (52.6 kg)  BMI 21.92 kg/m2  GENERAL APPEARANCE: Alert, female seen.  Pale and confused. Able to respond verbally. Poor recall of any recent past events.     HEAD: Normocephalic. No facial asymmetry.     NECK:  Trachea midline.  NO palpable lymphadenopathy.  EYES: conjunctiva and lids normal, Wearing eyeglasses.  RESP: Quiet, effortless respirations. No cough. CTA bilaterally.  CV: Irregular rhythm, Grade 2/6 cardiac murmur. No LE edema. DP pulses +1 bilaterally..  BREASTS;  Symmetrical rounded breasts.  No palpable masses or dimpling.  ABDOMEN: Soft rounded abdomen. BS's positive all 4 quadrants. No tenderness with palpation. No guarding.   M/S:. Able to open all five fingers of right hand.  Left hand third and fourth finger is triggered in frozen position with swelling of 1st metacarpal joint.  Unable to open finger manually. Limited ROM of right shoulder (h/o dislocation).    SKIN:  Several right forehead lesions that she has opened up by picking, which is baseline behavior for her.  No pedal wounds. ..   NEURO: Oriented to person only.  Numerous repetitive statements. No tremors  .  PSYCH: Suspicious of examiner and exam. Confused, and would ask why exam was being done, but then easily smiled.    Lab/Diagnostic data:   CBC RESULTS:   Recent Labs   Lab Test 05/10/18 01/29/18 11/06/17 11/03/17  02/13/15   WBC   " --    --   7.4  11.2*   < >  7.2   RBC   --    --   3.59*  3.49*   < >  3.76*   HGB  12.8  12.9  11.9  11.8   < >  12.5   HCT   --    --   37.3  36.4   < >  38.5   MCV   --    --   103.9*  104.3*   < >  102*   MCH   --    --    --    --    --   33.2*   MCHC   --    --    --    --    --   32.6   RDW   --    --   13.2  13.4   < >  13.8   PLT   --    --   342  243   < >  223    < > = values in this interval not displayed.       Last Basic Metabolic Panel:  Recent Labs   Lab Test 05/10/18 01/29/18   NA  143  141   POTASSIUM  4.2  4.3   CHLORIDE  105  104   BRIJESH  9.1  9.6   CO2  28  29   BUN  18  23   CR  0.67  0.72   GLC  89  91     GFR Estimate   Date Value Ref Range Status   05/10/2018 >60 >60 ml/min/1.73m2 Final   01/29/2018 >60 >60 mL/min/1.73m2 Final   11/06/2017 >60 >60 mL/min/1.73m2 Final   11/03/2017 53 (L) >60 mL/min/1.73m2 Final   08/24/2017 >60 >60 ml/min/1.73m2 Final       Liver Function Studies -   Recent Labs   Lab Test 05/10/18 10/17/16   PROTTOTAL  6.2*  5.9   ALBUMIN  3.5  3.4   BILITOTAL  1.1  1.4   ALKPHOS  96  68   AST  15  21   ALT  <10  13     ASSESSMENT/PLAN  (G40.909) Seizure disorder (H)  (primary encounter diagnosis)  Comment: Chronic, but asymptomatic  Plan: Continue Keppra.  Monitor    (I10) Benign essential hypertension  Comment: Based on JNC-8 goals,  patients age of 89 year old, no presence of diabetes or CKD, and goals of care goal BP is <150/90 mm Hg. Patient is stable with current plan of care and routine assessment..    (I73.9) PVD (peripheral vascular disease) (H)  Comment: Chronic  Plan: Continue monitoring and daily ASA.    (I48.2) Chronic atrial fibrillation (H)  Comment: Chronic  Plan: Continue metoprolol for rate control and ASA for anticoagulation.    (E44.1) Mild protein-calorie malnutrition (H)  Comment: Resolved  Plan: Remove from problem list.  Monitor for recurrence.    (M81.0) Osteoporosis without current pathological fracture, unspecified osteoporosis type  Comment:  Chronic  Plan: Continue to monitor for any new pain.    (M15.0) Primary osteoarthritis involving multiple joints  Comment: Chronic, but pain controlled  Plan: MOnitor for increased pain.    (F03.91) Dementia with behavioral disturbance, unspecified dementia type  Comment: Slowly advancing  Plan: Continue POC on memory care unit.    Electronically signed by:  RAFAEL Flowers CNP

## 2018-09-11 ASSESSMENT — PATIENT HEALTH QUESTIONNAIRE - PHQ9: SUM OF ALL RESPONSES TO PHQ QUESTIONS 1-9: 0

## 2018-10-31 NOTE — PROGRESS NOTES
[facility] [facility]    Chief Complaint   Patient presents with     RECHECK     [place] Medical Record Number:  [id number]  Place of Service where encounter took place:  [facility] [[id number]]    HPI:    Yeni Pérez is a 89 year old  (2/9/1927), who is being seen today for an episodic care visit.  HPI information obtained from: facility chart records, facility staff, patient report and [facility] chart review Pt is unreliable historian due to cognitive loss. Today's concern is:  Seizure disorder (H)  No reports of recent seizures.  No change to Keppra dose.    Benign essential hypertension  BP ranges in past two weeks: 118//75.  No reports of obvious chest pain    Primary osteoarthritis involving multiple joints  No reports of increase in joint or back pain.    Chronic atrial fibrillation (H)  Heart rate ranges in past two weeks: 68-81.  Remains on ASA for anticoagulation.    Dementia with behavioral disturbance, unspecified dementia type  Resides on secure memory care unit. BIMS score of 3.  Does feed herself.  Can be resistive to cares.    Frozen joint, left 3rd finger  Continues with contracture of left 2nd and 3rd finger.    ALLERGIES: Boniva [ibandronic acid]; Food; Lexapro [escitalopram]; No clinical screening - see comments; and Zoloft [sertraline]  Past Medical, Surgical, Family and Social History reviewed and updated in [facility].    Current Outpatient Prescriptions   Medication Sig Dispense Refill     ACETAMINOPHEN PO Take 650 mg by mouth every 4 hours as needed for pain       allopurinol (ZYLOPRIM) 300 MG tablet Take 300 mg by mouth daily       aspirin 81 MG EC tablet Take 1 tablet (81 mg) by mouth daily 90 tablet 3     bisacodyl (DULCOLAX) 10 MG Suppository Place 1 suppository (10 mg) rectally every 3 days prn 30 suppository      docusate sodium (COLACE) 100 MG capsule Take 1 capsule (100 mg) by mouth every other day 60 capsule      emollient (VANICREAM) cream Apply  topically 2 times daily       levETIRAcetam (KEPPRA) 500 MG tablet Take 500 mg by mouth daily       magnesium hydroxide (MILK OF MAGNESIA) 400 MG/5ML suspension Take 30 mLs by mouth daily as needed for constipation or heartburn        METOPROLOL TARTRATE PO Take 12.5 mg by mouth daily       Misc Natural Products (GLUCOSAMINE CHOND COMPLEX/MSM PO) Take 1 tablet by mouth daily       senna-docusate (SENOKOT-S;PERICOLACE) 8.6-50 MG per tablet Take 2 tablets by mouth At Bedtime And 2 tabs daily prn       Medications reviewed:  Medications reconciled to facility chart and changes were made to reflect current medications as identified as above med list. Below are the changes that were made:   Medications stopped since last EPIC medication reconciliation:   There are no discontinued medications.    Medications started since last [facility] medication reconciliation:  No orders of the defined types were placed in this encounter.    Patient Active Problem List   Diagnosis     History of hepatitis A  in 1980     History of hepatitis B in 1980     Cyst on ear, left, 2/25/2013     H/O squamous cell carcinoma of skin, right hand, 11/28/2011     Ganglion cyst, 1st MTP left 3/25/2009     Seizure disorder (H)     H/O renal calculi, 1987     PVC's (premature ventricular contractions)     H/O small bowel obstruction, lysis of adhesions, 1970     Osteoporosis, Bone Density 2013: Femeral neck: -2.6 (L) and -2.3 (R)     Cardiomegaly     Diverticulosis of large intestine without hemorrhage     Bilateral dry eyes     Heart murmur     Ataxia     Chronic fatigue     Benign essential hypertension     Primary osteoarthritis involving multiple joints     PVD (peripheral vascular disease) (H)     Abnormal mammogram (3./12/2015: 4mm grouping of calcificaions in the posterior central right breast)     Advance Care Planning     Low weight     External hemorrhoids     Chronic atrial fibrillation (H)     Dermatitis due to unknown cause, scalp     Slow  "transit constipation     Dementia with behavioral disturbance, unspecified dementia type     Frozen joint, left 3rd finger       REVIEW OF SYSTEMS:  Unobtainable secondary to cognitive impairment, but today pt reports no pain or concern.      Physical Exam:  /75  Pulse 74  Temp 97.6  F (36.4  C)  Resp 18  Ht 5' 1\" (1.549 m)  Wt 118 lb (53.5 kg)  BMI 22.3 kg/m2  GENERAL APPEARANCE: Alert, female seen.  Pale and confused. Able to respond verbally. Poor recall of any recent past events.     HEAD: Normocephalic. No facial asymmetry.     NECK:  Trachea midline.  NO palpable lymphadenopathy.  EYES: conjunctiva and lids normal, Wearing eyeglasses.  RESP: Quiet, effortless respirations. No cough. CTA bilaterally.  CV: Irregular rhythm, Grade 2/6 cardiac murmur. No LE edema. DP pulses +1 bilaterally..  ABDOMEN: Soft rounded abdomen. BS's positive all 4 quadrants. No tenderness with palpation. No guarding.   M/S:. Able to open all five fingers of right hand.  Left hand second and third fingers are contracted in frozen position with swelling of 1st metacarpal joint.  Unable to open finger manually. Limited ROM of right shoulder (h/o dislocation).    NEURO: Oriented to person only.  Numerous repetitive statements. No tremors  .  PSYCH: Suspicious of examiner and exam. Confused, and would ask why exam was being done, but then easily smiled.       Recent Labs:   CBC RESULTS:   Recent Labs   Lab Test 05/10/18 01/29/18 11/06/17 11/03/17 02/13/15   WBC   --    --   7.4  11.2*  7.2   RBC   --    --   3.59*  3.49*  3.76*   HGB  12.8  12.9  11.9  11.8  12.5   HCT   --    --   37.3  36.4  38.5   MCV   --    --   103.9*  104.3*  102*   MCH   --    --    --    --   33.2*   MCHC   --    --    --    --   32.6   RDW   --    --   13.2  13.4  13.8   PLT   --    --   342  243  223       Last Basic Metabolic Panel:  Recent Labs   Lab Test 05/10/18 01/29/18   NA  143  141   POTASSIUM  4.2  4.3   CHLORIDE  105  104   BRIJESH  9.1  9.6 "   CO2  28  29   BUN  18  23   CR  0.67  0.72   GLC  89  91     GFR Estimate   Date Value Ref Range Status   05/10/2018 >60 >60 ml/min/1.73m2 Final   01/29/2018 >60 >60 mL/min/1.73m2 Final   11/06/2017 >60 >60 mL/min/1.73m2 Final   11/03/2017 53 (L) >60 mL/min/1.73m2 Final   08/24/2017 >60 >60 ml/min/1.73m2 Final     Liver Function Studies -   Recent Labs   Lab Test 05/10/18 10/17/16   PROTTOTAL  6.2*  5.9   ALBUMIN  3.5  3.4   BILITOTAL  1.1  1.4   ALKPHOS  96  68   AST  15  21   ALT  <10  13     Assessment/Plan:  (G40.909) Seizure disorder (H)  (primary encounter diagnosis)  Comment: Chronic  Plan: Continue Keppra and monitoring.    (I10) Benign essential hypertension  Comment: BP goal; <150/90, at goal  Plan: Continue current POC.  BMP on Monday.    (M15.0) Primary osteoarthritis involving multiple joints  Comment: Chronic, pt denies pain.  Plan:  Continue current POC.    (I48.2) Chronic atrial fibrillation (H)  Comment: CHronic and rate controlled  Plan: Continue metoprolol and ASA.  MOnitor.    (F03.91) Dementia with behavioral disturbance, unspecified dementia type  Comment: Chronic  Plan: Continue POC on secure memory care unit and monitor for expected progression.    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  Has completed Occupational Therapy.     Electronically signed by  RAFAEL Flowers CNP

## 2018-11-13 NOTE — PROGRESS NOTES
Pt was seen for a regulatory LTC visit  Case reviewed with NP    Pt's status has been stable  She requires near total assistance with cares, is often resistive to cares  No recent seizures    Vitals stable    Sleepy, easily awakens, [place]  Oriented to person  Lungs clear  CV irreg,HR 70s  Abd soft    Assessment    Dementia, advanced  Chronic afib, HR controlled  HTN, stable  Seizure disorder, stable on Keppra    Plan  Continue current tx

## 2019-01-01 ENCOUNTER — TRANSFERRED RECORDS (OUTPATIENT)
Dept: HEALTH INFORMATION MANAGEMENT | Facility: CLINIC | Age: 84
End: 2019-01-01

## 2019-01-01 ENCOUNTER — NURSING HOME VISIT (OUTPATIENT)
Dept: GERIATRICS | Facility: CLINIC | Age: 84
End: 2019-01-01
Payer: MEDICARE

## 2019-01-01 ENCOUNTER — TELEPHONE (OUTPATIENT)
Dept: GERIATRICS | Facility: CLINIC | Age: 84
End: 2019-01-01

## 2019-01-01 ENCOUNTER — DOCUMENTATION ONLY (OUTPATIENT)
Dept: GERIATRICS | Facility: CLINIC | Age: 84
End: 2019-01-01
Payer: MEDICARE

## 2019-01-01 VITALS — HEART RATE: 118 BPM | HEIGHT: 61 IN | BODY MASS INDEX: 19.84 KG/M2 | RESPIRATION RATE: 18 BRPM

## 2019-01-01 VITALS
HEIGHT: 61 IN | BODY MASS INDEX: 19.83 KG/M2 | DIASTOLIC BLOOD PRESSURE: 74 MMHG | SYSTOLIC BLOOD PRESSURE: 129 MMHG | WEIGHT: 105 LBS | HEART RATE: 72 BPM | RESPIRATION RATE: 18 BRPM | TEMPERATURE: 97.5 F

## 2019-01-01 VITALS
BODY MASS INDEX: 23.98 KG/M2 | HEIGHT: 61 IN | RESPIRATION RATE: 20 BRPM | SYSTOLIC BLOOD PRESSURE: 145 MMHG | TEMPERATURE: 96 F | HEART RATE: 75 BPM | WEIGHT: 127 LBS | DIASTOLIC BLOOD PRESSURE: 84 MMHG

## 2019-01-01 VITALS
RESPIRATION RATE: 18 BRPM | WEIGHT: 105 LBS | DIASTOLIC BLOOD PRESSURE: 65 MMHG | TEMPERATURE: 97.8 F | BODY MASS INDEX: 19.84 KG/M2 | HEART RATE: 71 BPM | SYSTOLIC BLOOD PRESSURE: 109 MMHG

## 2019-01-01 VITALS
RESPIRATION RATE: 20 BRPM | WEIGHT: 115 LBS | SYSTOLIC BLOOD PRESSURE: 127 MMHG | BODY MASS INDEX: 21.73 KG/M2 | DIASTOLIC BLOOD PRESSURE: 78 MMHG | TEMPERATURE: 97.5 F | HEART RATE: 70 BPM

## 2019-01-01 VITALS
DIASTOLIC BLOOD PRESSURE: 64 MMHG | WEIGHT: 105 LBS | BODY MASS INDEX: 19.83 KG/M2 | TEMPERATURE: 97.8 F | HEART RATE: 102 BPM | RESPIRATION RATE: 18 BRPM | SYSTOLIC BLOOD PRESSURE: 128 MMHG | HEIGHT: 61 IN

## 2019-01-01 VITALS — BODY MASS INDEX: 19.84 KG/M2 | TEMPERATURE: 97.8 F | RESPIRATION RATE: 16 BRPM | HEIGHT: 61 IN | HEART RATE: 96 BPM

## 2019-01-01 VITALS
HEART RATE: 67 BPM | BODY MASS INDEX: 23.22 KG/M2 | DIASTOLIC BLOOD PRESSURE: 72 MMHG | HEIGHT: 61 IN | TEMPERATURE: 98.2 F | WEIGHT: 123 LBS | RESPIRATION RATE: 18 BRPM | SYSTOLIC BLOOD PRESSURE: 143 MMHG

## 2019-01-01 VITALS — BODY MASS INDEX: 21.14 KG/M2 | RESPIRATION RATE: 16 BRPM | HEIGHT: 61 IN | WEIGHT: 112 LBS | HEART RATE: 122 BPM

## 2019-01-01 VITALS
HEART RATE: 76 BPM | TEMPERATURE: 98.8 F | WEIGHT: 107 LBS | RESPIRATION RATE: 18 BRPM | SYSTOLIC BLOOD PRESSURE: 136 MMHG | BODY MASS INDEX: 20.22 KG/M2 | DIASTOLIC BLOOD PRESSURE: 68 MMHG

## 2019-01-01 VITALS
TEMPERATURE: 97.5 F | RESPIRATION RATE: 20 BRPM | BODY MASS INDEX: 19.84 KG/M2 | SYSTOLIC BLOOD PRESSURE: 135 MMHG | HEART RATE: 78 BPM | DIASTOLIC BLOOD PRESSURE: 82 MMHG | HEIGHT: 61 IN

## 2019-01-01 VITALS
BODY MASS INDEX: 20.97 KG/M2 | SYSTOLIC BLOOD PRESSURE: 135 MMHG | TEMPERATURE: 98.9 F | HEART RATE: 78 BPM | DIASTOLIC BLOOD PRESSURE: 70 MMHG | WEIGHT: 111 LBS | RESPIRATION RATE: 20 BRPM

## 2019-01-01 DIAGNOSIS — I48.20 CHRONIC ATRIAL FIBRILLATION (H): ICD-10-CM

## 2019-01-01 DIAGNOSIS — R62.7 FAILURE TO THRIVE IN ADULT: Primary | ICD-10-CM

## 2019-01-01 DIAGNOSIS — I10 BENIGN ESSENTIAL HYPERTENSION: Primary | ICD-10-CM

## 2019-01-01 DIAGNOSIS — K59.01 SLOW TRANSIT CONSTIPATION: ICD-10-CM

## 2019-01-01 DIAGNOSIS — G40.909 SEIZURE DISORDER (H): ICD-10-CM

## 2019-01-01 DIAGNOSIS — I73.9 PVD (PERIPHERAL VASCULAR DISEASE) (H): ICD-10-CM

## 2019-01-01 DIAGNOSIS — R19.5 LOOSE STOOLS: Primary | ICD-10-CM

## 2019-01-01 DIAGNOSIS — E44.0 MODERATE PROTEIN-CALORIE MALNUTRITION (H): Primary | ICD-10-CM

## 2019-01-01 DIAGNOSIS — F03.91 DEMENTIA WITH BEHAVIORAL DISTURBANCE, UNSPECIFIED DEMENTIA TYPE: Primary | ICD-10-CM

## 2019-01-01 DIAGNOSIS — F03.91 DEMENTIA WITH BEHAVIORAL DISTURBANCE, UNSPECIFIED DEMENTIA TYPE: ICD-10-CM

## 2019-01-01 DIAGNOSIS — E44.0 MODERATE PROTEIN-CALORIE MALNUTRITION (H): ICD-10-CM

## 2019-01-01 DIAGNOSIS — I10 BENIGN ESSENTIAL HYPERTENSION: ICD-10-CM

## 2019-01-01 DIAGNOSIS — K52.9 GASTROENTERITIS: Primary | ICD-10-CM

## 2019-01-01 DIAGNOSIS — M62.81 GENERALIZED MUSCLE WEAKNESS: ICD-10-CM

## 2019-01-01 DIAGNOSIS — Z51.5 HOSPICE CARE PATIENT: ICD-10-CM

## 2019-01-01 DIAGNOSIS — E44.1 MILD PROTEIN-CALORIE MALNUTRITION (H): ICD-10-CM

## 2019-01-01 DIAGNOSIS — R63.4 WEIGHT LOSS: ICD-10-CM

## 2019-01-01 DIAGNOSIS — N18.30 CKD (CHRONIC KIDNEY DISEASE) STAGE 3, GFR 30-59 ML/MIN (H): ICD-10-CM

## 2019-01-01 LAB
ALBUMIN SERPL-MCNC: 2.9 G/DL (ref 3.5–5)
ALP SERPL-CCNC: 94 U/L (ref 40–150)
ALT SERPL-CCNC: <10 U/L (ref 0–55)
ANION GAP SERPL CALCULATED.3IONS-SCNC: 14 MMOL/L (ref 7–16)
ANION GAP SERPL CALCULATED.3IONS-SCNC: 14 MMOL/L (ref 7–16)
ANION GAP SERPL CALCULATED.3IONS-SCNC: 9 MMOL/L (ref 7–16)
AST SERPL-CCNC: 13 U/L (ref 10–40)
BILIRUB SERPL-MCNC: 0.8 MG/DL (ref 0.2–1.2)
BILIRUBIN DIRECT: 0.3 MG/DL (ref 0–0.5)
BUN SERPL-MCNC: 13 MG/DL (ref 7–26)
BUN SERPL-MCNC: 16 MG/DL (ref 7–26)
BUN SERPL-MCNC: 29 MG/DL (ref 7–26)
CALCIUM SERPL-MCNC: 9 MG/DL (ref 8.4–10.4)
CALCIUM SERPL-MCNC: 9 MG/DL (ref 8.4–10.4)
CALCIUM SERPL-MCNC: 9.3 MG/DL (ref 8.4–10.4)
CHLORIDE SERPLBLD-SCNC: 102 MMOL/L (ref 98–109)
CHLORIDE SERPLBLD-SCNC: 103 MMOL/L (ref 98–109)
CHLORIDE SERPLBLD-SCNC: 103 MMOL/L (ref 98–109)
CO2 SERPL-SCNC: 21 MMOL/L (ref 20–29)
CO2 SERPL-SCNC: 26 MMOL/L (ref 20–29)
CO2 SERPL-SCNC: 28 MMOL/L (ref 20–29)
CREAT SERPL-MCNC: 0.69 MG/DL (ref 0.55–1)
CREAT SERPL-MCNC: 0.7 MG/DL (ref 0.55–1.02)
CREAT SERPL-MCNC: 1 MG/DL (ref 0.55–1.02)
DIFFERENTIAL: ABNORMAL
ERYTHROCYTE [DISTWIDTH] IN BLOOD BY AUTOMATED COUNT: 13.6 % (ref 11.9–15.5)
GFR SERPL CREATININE-BSD FRML MDRD: 49 ML/MIN/1.73M2
GFR SERPL CREATININE-BSD FRML MDRD: >60 ML/MIN/1.73M2
GFR SERPL CREATININE-BSD FRML MDRD: >60 ML/MIN/1.73M2
GLUCOSE SERPL-MCNC: 100 MG/DL (ref 70–100)
GLUCOSE SERPL-MCNC: 87 MG/DL (ref 70–100)
GLUCOSE SERPL-MCNC: 89 MG/DL (ref 70–100)
HCT VFR BLD AUTO: 35.2 % (ref 34.9–44.5)
HEMOGLOBIN: 11.5 G/DL (ref 12–15.5)
HEMOGLOBIN: 12.3 G/DL (ref 12–15.5)
MCH RBC QN AUTO: 33.5 PG (ref 27.6–33.3)
MCHC RBC AUTO-ENTMCNC: 32.7 G/DL (ref 31.5–35.2)
MCV RBC AUTO: 102.6 FL (ref 80–100)
PLATELET # BLD AUTO: 194 X10(9)/L (ref 150–450)
POTASSIUM SERPL-SCNC: 3.2 MMOL/L (ref 3.5–5.1)
POTASSIUM SERPL-SCNC: 4 MMOL/L (ref 3.5–5.1)
POTASSIUM SERPL-SCNC: 4.2 MMOL/L (ref 3.5–5.1)
PROT SERPL-MCNC: 6.3 G/DL (ref 6.4–8.3)
RBC # BLD AUTO: 3.43 X10(12)/L (ref 3.9–5.03)
SODIUM SERPL-SCNC: 138 MMOL/L (ref 136–145)
SODIUM SERPL-SCNC: 139 MMOL/L (ref 136–145)
SODIUM SERPL-SCNC: 143 MMOL/L (ref 136–145)
WBC # BLD AUTO: 7.9 X10(9)/L (ref 3.5–10.5)

## 2019-01-01 PROCEDURE — 99318 ZZC ANNUAL NURSING FAC ASSESSMNT, STABLE: CPT | Performed by: NURSE PRACTITIONER

## 2019-01-01 PROCEDURE — 99309 SBSQ NF CARE MODERATE MDM 30: CPT | Mod: GW | Performed by: NURSE PRACTITIONER

## 2019-01-01 PROCEDURE — 99309 SBSQ NF CARE MODERATE MDM 30: CPT | Performed by: NURSE PRACTITIONER

## 2019-01-01 PROCEDURE — 99310 SBSQ NF CARE HIGH MDM 45: CPT | Mod: GW | Performed by: NURSE PRACTITIONER

## 2019-01-01 PROCEDURE — 99358 PROLONG SERVICE W/O CONTACT: CPT | Performed by: NURSE PRACTITIONER

## 2019-01-01 RX ORDER — LORAZEPAM 2 MG/ML
1 CONCENTRATE ORAL EVERY 4 HOURS
COMMUNITY

## 2019-01-01 RX ORDER — BISACODYL 10 MG
10 SUPPOSITORY, RECTAL RECTAL EVERY OTHER DAY
COMMUNITY

## 2019-01-01 RX ORDER — ACETAMINOPHEN 650 MG/1
650 SUPPOSITORY RECTAL EVERY 6 HOURS PRN
COMMUNITY

## 2019-01-01 RX ORDER — MORPHINE SULFATE 20 MG/ML
SOLUTION ORAL
Start: 2019-01-01 | End: 2019-01-01

## 2019-01-01 RX ORDER — AMOXICILLIN 250 MG
2 CAPSULE ORAL DAILY PRN
COMMUNITY

## 2019-01-01 RX ORDER — BISACODYL 10 MG
10 SUPPOSITORY, RECTAL RECTAL EVERY OTHER DAY
Start: 2019-01-01

## 2019-01-01 RX ORDER — LOPERAMIDE HYDROCHLORIDE 2 MG/1
2 TABLET ORAL 3 TIMES DAILY PRN
Start: 2019-01-01

## 2019-01-01 RX ORDER — MORPHINE SULFATE 20 MG/ML
10 SOLUTION ORAL EVERY 4 HOURS
Start: 2019-01-01

## 2019-01-01 RX ORDER — MORPHINE SULFATE 20 MG/ML
5 SOLUTION ORAL EVERY 4 HOURS
COMMUNITY
End: 2019-01-01

## 2019-01-01 RX ORDER — MORPHINE SULFATE 20 MG/ML
2.5 SOLUTION ORAL
COMMUNITY
End: 2019-01-01

## 2019-01-01 ASSESSMENT — MIFFLIN-ST. JEOR
SCORE: 823.66
SCORE: 905.3
SCORE: 855.41
SCORE: 823.66
SCORE: 928.45

## 2019-01-10 NOTE — PROGRESS NOTES
[facility]    Chief Complaint   Patient presents with     detention Regulatory       [facility] Record Number:  [id number]  Place of Service where encounter took place:  [facility] ([facility] [[id number]]    HPI:    Yeni Pérez is a 89 year old  (2/9/1927), who is being seen today for a federally mandated E/M visit.  HPI information obtained from: facility chart records, facility staff, patient report and [facility] Epic chart review. Pt is unreliable historian due to cognitive loss.     Today's concerns are:  PVD (peripheral vascular disease) (H)  No reports of pedal wounds.     Seizure disorder (H)  No reports of obvious seizure activity  Remains on daily Keppra.    Chronic atrial fibrillation (H)  No reports of heart rates >100/min.  Remains on low dose metoprolol for rate control and ASA for anticoagulation.  Is not a coumadin candidate due to her fall risk.    Benign essential hypertension  BP ranges in past month: 127/70 - 148/84 with one value of 159/78.  No reports of obvious chest pain.    Dementia with behavioral disturbance, unspecified dementia type  Resides on secure memory care unit.  BIMS score of 3.  Resistive to most attempts to assist with hygiene.  Daughter assists about twice a month with showers.  Uses w/c for all distance movement.  Continues with long time behavior of picking at her skin and will usually have small open areas, especially on her face.        ALLERGIES: Boniva [ibandronic acid]; Food; Lexapro [escitalopram]; No clinical screening - see comments; and Zoloft [sertraline]  PAST MEDICAL HISTORY:  has a past medical history of Abnormal mammogram (3./12/2015: 4mm grouping of calcificaions in the posterior central right breast) (11/9/2015), Advanced directives, counseling/discussion, POLST completed 11/9/15, DNR/DNI (11/9/2015), Ataxia (11/9/2015), Benign essential hypertension (11/9/2015), Bilateral dry eyes (11/9/2015), Cardiomegaly (11/9/2015),  Chronic atrial fibrillation (H) (4/10/2017), Chronic fatigue (11/9/2015), Cyst on ear, left, 2/25/2013 (11/9/2015), Dementia with behavioral disturbance, unspecified dementia type (1/25/2018), Dermatitis due to unknown cause, scalp (6/19/2017), Dislocated shoulder, right, sequela (11/3/2017), Diverticulosis of large intestine without hemorrhage (11/9/2015), External hemorrhoids (11/14/2016), Frozen joint, left 3rd finger (4/2/2018), Ganglion cyst, 1st MTP left 3/25/2009 (11/9/2015), H/O renal calculi, 1987 (11/9/2015), H/O small bowel obstruction, lysis of adhesions, 1970 (11/9/2015), H/O squamous cell carcinoma of skin, right hand, 11/28/2011 (11/9/2015), Heart murmur (11/9/2015), History of hepatitis A  in 1980 (11/5/2015), History of hepatitis B in 1980 (11/5/2015), Low weight (12/4/2015), Major depression in complete remission (H) (11/9/2015), Osteoporosis, Bone Density 2013: Femeral neck: -2.6 (L) and -2.3 (R) (11/9/2015), Primary osteoarthritis involving multiple joints (11/9/2015), PVC's (premature ventricular contractions) (11/9/2015), PVD (peripheral vascular disease) (H) (11/9/2015), Seizure disorder (H) (11/9/2015), Senile dementia without behavioral disturbance (12/4/2015), Shingles, and Slow transit constipation (11/20/2017).  PAST SURGICAL HISTORY:  has a past surgical history that includes appendectomy; Cystectomy ovarian benign; LAP,LYSIS OF ADHESIONS; Bunionectomy; and cataract iol, rt/lt.  FAMILY HISTORY: family history includes Colon Cancer in her mother; Depression in her daughter; Leukemia in her brother; Myocardial Infarction in her father; Ovarian Cancer in her sister.  SOCIAL HISTORY:  reports that she has quit smoking. She does not have any smokeless tobacco history on file. She reports that she drinks alcohol.    MEDICATIONS:  Current Outpatient Medications   Medication Sig Dispense Refill     ACETAMINOPHEN PO Take 650 mg by mouth every 4 hours as needed for pain       allopurinol  (ZYLOPRIM) 300 MG tablet Take 300 mg by mouth daily       aspirin 81 MG EC tablet Take 1 tablet (81 mg) by mouth daily 90 tablet 3     bisacodyl (DULCOLAX) 10 MG Suppository Place 1 suppository (10 mg) rectally every 3 days prn 30 suppository      docusate sodium (COLACE) 100 MG capsule Take 1 capsule (100 mg) by mouth every other day 60 capsule      emollient (VANICREAM) cream Apply topically 2 times daily       levETIRAcetam (KEPPRA) 500 MG tablet Take 500 mg by mouth daily       magnesium hydroxide (MILK OF MAGNESIA) 400 MG/5ML suspension Take 30 mLs by mouth daily as needed for constipation or heartburn        METOPROLOL TARTRATE PO Take 12.5 mg by mouth daily       Misc Natural Products (GLUCOSAMINE CHOND COMPLEX/MSM PO) Take 1 tablet by mouth daily       senna-docusate (SENOKOT-S;PERICOLACE) 8.6-50 MG per tablet Take 2 tablets by mouth At Bedtime And 2 tabs daily prn       Medications reviewed:  Medications reconciled to facility chart and changes were made to reflect current medications as identified as above med list. Below are the changes that were made:   Medications stopped since last EPIC medication reconciliation:   There are no discontinued medications.    Medications started since last [facility] medication reconciliation:  No orders of the defined types were placed in this encounter.    Patient Active Problem List   Diagnosis     History of hepatitis A  in 1980     History of hepatitis B in 1980     Cyst on ear, left, 2/25/2013     H/O squamous cell carcinoma of skin, right hand, 11/28/2011     Ganglion cyst, 1st MTP left 3/25/2009     Seizure disorder (H)     H/O renal calculi, 1987     PVC's (premature ventricular contractions)     H/O small bowel obstruction, lysis of adhesions, 1970     Osteoporosis, Bone Density 2013: Femeral neck: -2.6 (L) and -2.3 (R)     Cardiomegaly     Diverticulosis of large intestine without hemorrhage     Bilateral dry eyes     Heart murmur     Ataxia     Chronic fatigue      "Benign essential hypertension     Primary osteoarthritis involving multiple joints     PVD (peripheral vascular disease) (H)     Abnormal mammogram (3./12/2015: 4mm grouping of calcificaions in the posterior central right breast)     Advance Care Planning     Low weight     External hemorrhoids     Chronic atrial fibrillation (H)     Dermatitis due to unknown cause, scalp     Slow transit constipation     Dementia with behavioral disturbance, unspecified dementia type     Frozen joint, 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 finger     Case Management:  I have reviewed the care plan and MDS and do agree with the plan. Patient's desire to return to the community is not assessible due to cognitive impairment.  Information reviewed:  Medications, vital signs, orders, and nursing notes.    ROS:  Unobtainable secondary to cognitive impairment, but today pt reports no pain or concern.     Exam:  Vitals: /84   Pulse 75   Temp 96  F (35.6  C)   Resp 20   Ht 1.549 m (5' 1\")   Wt 57.6 kg (127 lb)   BMI 24.00 kg/m    BMI= Body mass index is 24 kg/m .  GENERAL APPEARANCE: Alert, female seen.  Pale and confused. Able to respond verbally. Poor recall of any recent past events and agitated with exam.  States \"I don't know why you are here\".      HEAD: Normocephalic. No facial asymmetry.     NECK:  Trachea midline.  No palpable lymphadenopathy.  EYES: conjunctiva and lids normal, Wearing eyeglasses.  RESP: Quiet, effortless respirations. No cough. CTA bilaterally.  CV: Irregular rhythm, Grade 2/6 cardiac murmur. No LE edema. DP pulses +1 bilaterally..  ABDOMEN: Soft rounded abdomen. BS's positive all 4 quadrants. No tenderness with palpation. No guarding.   M/S:. Able to open all five fingers of right hand.  Left hand second and third fingers are contracted in frozen position with swelling of 1st metacarpal joint.  Unable to open finger manually.   NEURO: Oriented to person only.  Numerous repetitive statements. No tremors  Purposeful movement " of all extremities without focal weakness.  SKIN:  Refused pedal exam.   PSYCH: Suspicious of examiner and exam. Confused, and would ask why exam was being done    Lab/Diagnostic data:   CBC RESULTS:   Recent Labs   Lab Test 11/05/18 05/10/18 11/06/17 11/03/17 02/13/15   WBC  --   --  7.4 11.2* 7.2   RBC  --   --  3.59* 3.49* 3.76*   HGB 12.8 12.8 11.9 11.8 12.5   HCT  --   --  37.3 36.4 38.5   MCV  --   --  103.9* 104.3* 102*   MCH  --   --   --   --  33.2*   MCHC  --   --   --   --  32.6   RDW  --   --  13.2 13.4 13.8   PLT  --   --  342 243 223       Last Basic Metabolic Panel:  Recent Labs   Lab Test 11/05/18 05/10/18    143   POTASSIUM 4.4 4.2   CHLORIDE 106 105   BRIJESH 9.7 9.1   CO2 28 28   BUN 16 18   CR 0.77 0.67   GLC 77 89     GFR Estimate   Date Value Ref Range Status   11/05/2018 >60 >60 ml/min/1.73m2 Final   05/10/2018 >60 >60 ml/min/1.73m2 Final   01/29/2018 >60 >60 mL/min/1.73m2 Final   11/06/2017 >60 >60 mL/min/1.73m2 Final   11/03/2017 53 (L) >60 mL/min/1.73m2 Final     Liver Function Studies -   Recent Labs   Lab Test 05/10/18 10/17/16   PROTTOTAL 6.2* 5.9   ALBUMIN 3.5 3.4   BILITOTAL 1.1 1.4   ALKPHOS 96 68   AST 15 21   ALT <10 13     ASSESSMENT/PLAN  (I10) Benign essential hypertension  (primary encounter diagnosis)  Comment: BP goal: <150/90, at goal majority of time  Plan: Continue current POC.     (I73.9) PVD (peripheral vascular disease) (H)  Comment: Chronic  Plan: Nsg to monitor pedal skin and toenails.    (G40.909) Seizure disorder (H)  Comment: No obvious symptoms  Plan: Continue Keppra and monitoring.    (I48.2) Chronic atrial fibrillation (H)  Comment: Rate controlled  Plan: Continue metoprolol and ASA.  Monitor.    (F03.91) Dementia with behavioral disturbance, unspecified dementia type  Comment: Advancing  Plan: Continue with POC on secure memory care unit.  Daughter to assist with bathing as much as possible.     Electronically signed by:  RAFAEL Flowers CNP

## 2019-01-25 PROBLEM — Z53.9 ERRONEOUS ENCOUNTER--DISREGARD: Status: ACTIVE | Noted: 2019-01-01

## 2019-02-25 NOTE — PROGRESS NOTES
[facility]    Chief Complaint   Patient presents with     RECHECK       [facility] Record Number:  [id number]  Place of Service where encounter took place:  [facility] ([facility] [[id number]]    HPI:    Yeni Pérez is a 89 year old  (2/9/1927), who is being seen today for an episodic care visit.  HPI information obtained from: facility chart records, facility staff, [facility] chart review and patient is unable to reliably participate in ROS due to cognitive loss..Today's concern is:  Gastroenteritis:  Numerous pts on the campus have been ill in the past two weeks with viral GI illness.  Yeni was affected on 2/22 with loose stools, which have resolved.     Seizure disorder (H)  No evidence of seizure activity. Remains on Keppra.    Chronic atrial fibrillation (H)  No reports of heart rates >100/min.  Remains on metoprolol and ASA.  Not a coumadin candidate due to fall risk.    Benign essential hypertension  BP ranges in past month: 127/78 - 144/76.  No reports of chest pain.    Mild protein-calorie malnutrition (H)  Weight remains low, but stable.    Dementia with behavioral disturbance, unspecified dementia type  Resides on memory care unit.  Often resistive to cares and daughter assists with showers.  No reports of recent acute injury. No falls in past month.    ALLERGIES: Boniva [ibandronic acid]; Food; Lexapro [escitalopram]; No clinical screening - see comments; and Zoloft [sertraline]  Past Medical, Surgical, Family and Social History reviewed and updated in [facility].    Current Outpatient Medications   Medication Sig Dispense Refill     ACETAMINOPHEN PO Take 650 mg by mouth every 4 hours as needed for pain       allopurinol (ZYLOPRIM) 300 MG tablet Take 300 mg by mouth daily       aspirin 81 MG EC tablet Take 1 tablet (81 mg) by mouth daily 90 tablet 3     bisacodyl (DULCOLAX) 10 MG Suppository Place 1 suppository (10 mg) rectally every 3 days prn 30 suppository       docusate sodium (COLACE) 100 MG capsule Take 1 capsule (100 mg) by mouth every other day 60 capsule      emollient (VANICREAM) cream Apply topically 2 times daily       levETIRAcetam (KEPPRA) 500 MG tablet Take 500 mg by mouth daily       magnesium hydroxide (MILK OF MAGNESIA) 400 MG/5ML suspension Take 30 mLs by mouth daily as needed for constipation or heartburn        METOPROLOL TARTRATE PO Take 12.5 mg by mouth daily       Misc Natural Products (GLUCOSAMINE CHOND COMPLEX/MSM PO) Take 1 tablet by mouth daily       senna-docusate (SENOKOT-S;PERICOLACE) 8.6-50 MG per tablet Take 2 tablets by mouth At Bedtime And 2 tabs daily prn       Medications reviewed:  Medications reconciled to facility chart and changes were made to reflect current medications as identified as above med list. Below are the changes that were made:   Medications stopped since last EPIC medication reconciliation:   There are no discontinued medications.    Medications started since last [facility] medication reconciliation:  No orders of the defined types were placed in this encounter.    Patient Active Problem List   Diagnosis     History of hepatitis A  in 1980     History of hepatitis B in 1980     Cyst on ear, left, 2/25/2013     H/O squamous cell carcinoma of skin, right hand, 11/28/2011     Ganglion cyst, 1st MTP left 3/25/2009     Seizure disorder (H)     H/O renal calculi, 1987     PVC's (premature ventricular contractions)     H/O small bowel obstruction, lysis of adhesions, 1970     Osteoporosis, Bone Density 2013: Femeral neck: -2.6 (L) and -2.3 (R)     Cardiomegaly     Diverticulosis of large intestine without hemorrhage     Bilateral dry eyes     Heart murmur     Ataxia     Chronic fatigue     Benign essential hypertension     Primary osteoarthritis involving multiple joints     PVD (peripheral vascular disease) (H)     Abnormal mammogram (3./12/2015: 4mm grouping of calcificaions in the posterior central right breast)     Advance Care  Planning     Low weight     External hemorrhoids     Chronic atrial fibrillation (H)     Dermatitis due to unknown cause, scalp     Slow transit constipation     Dementia with behavioral disturbance, unspecified dementia type     Frozen joint, 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 finger     REVIEW OF SYSTEMS:  Unobtainable secondary to cognitive impairment, but today pt reports no pain or concern.     Physical Exam:  /78   Pulse 70   Temp 97.5  F (36.4  C)   Resp 20   Wt 52.2 kg (115 lb)   BMI 21.73 kg/m    GENERAL APPEARANCE: Alert, female seen.  Pale and confused. Able to respond verbally. Poor recall of any recent past events. Much more pleasant than last visit..      HEAD: Normocephalic. No facial asymmetry.     EYES: conjunctiva and lids normal, Wearing eyeglasses.  RESP: Quiet, effortless respirations. No cough. CTA bilaterally.  CV: Irregular rhythm, Grade 2/6 cardiac murmur. No LE edema. DP pulses +1 bilaterally..  ABDOMEN: Soft rounded abdomen. BS's positive all 4 quadrants. No tenderness with palpation. No guarding.   M/S:. Able to open all five fingers of right hand.  Left hand second and third fingers are contracted in frozen position with swelling of 1st metacarpal joint.  Unable to open finger manually.   NEURO: Oriented to person only.  Numerous repetitive statements. No tremors  Purposeful movement of all extremities without focal weakness.    Recent Labs:   CBC RESULTS:   Recent Labs   Lab Test 11/05/18 05/10/18 11/06/17 11/03/17 02/13/15   WBC  --   --  7.4 11.2* 7.2   RBC  --   --  3.59* 3.49* 3.76*   HGB 12.8 12.8 11.9 11.8 12.5   HCT  --   --  37.3 36.4 38.5   MCV  --   --  103.9* 104.3* 102*   MCH  --   --   --   --  33.2*   MCHC  --   --   --   --  32.6   RDW  --   --  13.2 13.4 13.8   PLT  --   --  342 243 223       Last Basic Metabolic Panel:  Recent Labs   Lab Test 11/05/18 05/10/18    143   POTASSIUM 4.4 4.2   CHLORIDE 106 105   BRIJESH 9.7 9.1   CO2 28 28   BUN 16 18   CR 0.77 0.67   GLC 77 89      GFR Estimate   Date Value Ref Range Status   11/05/2018 >60 >60 ml/min/1.73m2 Final   05/10/2018 >60 >60 ml/min/1.73m2 Final   01/29/2018 >60 >60 mL/min/1.73m2 Final   11/06/2017 >60 >60 mL/min/1.73m2 Final   11/03/2017 53 (L) >60 mL/min/1.73m2 Final     Assessment/Plan:  (K52.9) Gastroenteritis  (primary encounter diagnosis)  Comment: Resolved  Plan: Monitor for recurrence.    (G40.909) Seizure disorder (H)  Comment: Asymptomatic  Plan: Continue low dose Keppra and monitoring.    (I48.2) Chronic atrial fibrillation (H)  Comment: Chronic, rate controlled  Plan: Continue ASA and metoprolol.  Not a coumadin candidate due to fall risk.    (I10) Benign essential hypertension  Comment: BP goal; <150/90, at goal  Plan: Continue current POC.    (E44.1) Mild protein-calorie malnutrition (H)  Comment: Chronic, but stable  Plan: COntinue supplements and encouragement of food.    (F03.91) Dementia with behavioral disturbance, unspecified dementia type  Comment: Advancing  Plan: Continue pOC on memory care unit. MOnitor for expected progression of disease.    Electronically signed by  RAAFEL Flowers CNP

## 2019-02-27 PROBLEM — Z53.9 ERRONEOUS ENCOUNTER--DISREGARD: Status: RESOLVED | Noted: 2019-01-01 | Resolved: 2019-01-01

## 2019-03-06 NOTE — PROGRESS NOTES
Patient was seen for a regulatory long-term care visit.  Course reviewed with nurse practitioner.    There have been no acute issues other than a recent self-limiting gastrointestinal infection.    Yeni does not volunteer any concerns.  Vital signs have been stable.  Blood pressure normal.  She is alert, oriented to self, thin appearing abdomen soft  Lungs clear  CV irregular rate in the 70s  No lower extremity edema      Assessment    Dementia with occasional behavioral dyscontrol, with frequent resistance to cares  Chronic atrial fibrillation, heart rate controlled on metoprolol.  On aspirin for anticoagulation  Hypertension, stable  Recent gastroenteritis, resolved  Seizure disorder, clinical recurrence, on Keppra    Plan    Continue current cares.

## 2019-05-16 NOTE — PROGRESS NOTES
[facility]  Chief Complaint   Patient presents with     care home Regulatory     [facility] Medical Record Number:  [id number]  Place of Service where encounter took place:  [facility] ([facility] [[id number]]    HPI:    Yeni Pérez  is 89 year old (2/9/1927), who is being seen today for a federally mandated E/M visit.  HPI information obtained from: facility chart records, facility staff and [facility] Epic chart review. Pt is unreliable for ROS due to cognitive loss. Today's concerns are:  Benign essential hypertension  BP ranges in past month:  136//86.  No reports of chest pain.    Dementia with behavioral disturbance, unspecified dementia type  Resides on memory care unit.  Can be resistive to attempts to assist with basic hygiene.  BIMS score of 4. Family visit often.    Chronic atrial fibrillation (H)  Pulse ranges in past month: 68-80.   No recent changes to metoprolol or aspirin doses.    Seizure disorder (H)  Remains on keppra daily.  No evidence of seizure activity.    PVD (peripheral vascular disease) (H)  No reports of pedal wounds.     ALLERGIES:Boniva [ibandronic acid]; Food; Lexapro [escitalopram]; No clinical screening - see comments; and Zoloft [sertraline]  PAST MEDICAL HISTORY:   has a past medical history of Abnormal mammogram (3./12/2015: 4mm grouping of calcificaions in the posterior central right breast) (11/9/2015), Advanced directives, counseling/discussion, POLST completed 11/9/15, DNR/DNI (11/9/2015), Ataxia (11/9/2015), Benign essential hypertension (11/9/2015), Bilateral dry eyes (11/9/2015), Cardiomegaly (11/9/2015), Chronic atrial fibrillation (H) (4/10/2017), Chronic fatigue (11/9/2015), Cyst on ear, left, 2/25/2013 (11/9/2015), Dementia with behavioral disturbance, unspecified dementia type (1/25/2018), Dermatitis due to unknown cause, scalp (6/19/2017), Dislocated shoulder, right, sequela (11/3/2017), Diverticulosis of large intestine without  hemorrhage (11/9/2015), External hemorrhoids (11/14/2016), Frozen joint, left 3rd finger (4/2/2018), Ganglion cyst, 1st MTP left 3/25/2009 (11/9/2015), H/O renal calculi, 1987 (11/9/2015), H/O small bowel obstruction, lysis of adhesions, 1970 (11/9/2015), H/O squamous cell carcinoma of skin, right hand, 11/28/2011 (11/9/2015), Heart murmur (11/9/2015), History of hepatitis A  in 1980 (11/5/2015), History of hepatitis B in 1980 (11/5/2015), Low weight (12/4/2015), Major depression in complete remission (H) (11/9/2015), Osteoporosis, Bone Density 2013: Femeral neck: -2.6 (L) and -2.3 (R) (11/9/2015), Primary osteoarthritis involving multiple joints (11/9/2015), PVC's (premature ventricular contractions) (11/9/2015), PVD (peripheral vascular disease) (H) (11/9/2015), Seizure disorder (H) (11/9/2015), Senile dementia without behavioral disturbance (12/4/2015), Shingles, and Slow transit constipation (11/20/2017).  PAST SURGICAL HISTORY:   has a past surgical history that includes appendectomy; Cystectomy ovarian benign; LAP,LYSIS OF ADHESIONS; Bunionectomy; and cataract iol, rt/lt.  FAMILY HISTORY: family history includes Colon Cancer in her mother; Depression in her daughter; Leukemia in her brother; Myocardial Infarction in her father; Ovarian Cancer in her sister.  SOCIAL HISTORY:  reports that she has quit smoking. She does not have any smokeless tobacco history on file. She reports that she drinks alcohol.    MEDICATIONS:  Current Outpatient Medications   Medication Sig Dispense Refill     ACETAMINOPHEN PO Take 650 mg by mouth every 4 hours as needed for pain       allopurinol (ZYLOPRIM) 300 MG tablet Take 300 mg by mouth daily       aspirin 81 MG EC tablet Take 1 tablet (81 mg) by mouth daily 90 tablet 3     bisacodyl (DULCOLAX) 10 MG Suppository Place 1 suppository (10 mg) rectally every 3 days prn 30 suppository      docusate sodium (COLACE) 100 MG capsule Take 1 capsule (100 mg) by mouth every other day 60  capsule      emollient (VANICREAM) cream Apply topically 2 times daily       levETIRAcetam (KEPPRA) 500 MG tablet Take 500 mg by mouth daily       magnesium hydroxide (MILK OF MAGNESIA) 400 MG/5ML suspension Take 30 mLs by mouth daily as needed for constipation or heartburn        METOPROLOL TARTRATE PO Take 12.5 mg by mouth daily       Misc Natural Products (GLUCOSAMINE CHOND COMPLEX/MSM PO) Take 1 tablet by mouth daily       senna-docusate (SENOKOT-S;PERICOLACE) 8.6-50 MG per tablet Take 2 tablets by mouth At Bedtime And 2 tabs daily prn       Patient Active Problem List   Diagnosis     History of hepatitis A  in 1980     History of hepatitis B in 1980     Cyst on ear, left, 2/25/2013     H/O squamous cell carcinoma of skin, right hand, 11/28/2011     Ganglion cyst, 1st MTP left 3/25/2009     Seizure disorder (H)     H/O renal calculi, 1987     PVC's (premature ventricular contractions)     H/O small bowel obstruction, lysis of adhesions, 1970     Osteoporosis, Bone Density 2013: Femeral neck: -2.6 (L) and -2.3 (R)     Cardiomegaly     Diverticulosis of large intestine without hemorrhage     Bilateral dry eyes     Heart murmur     Ataxia     Chronic fatigue     Benign essential hypertension     Primary osteoarthritis involving multiple joints     PVD (peripheral vascular disease) (H)     Abnormal mammogram (3./12/2015: 4mm grouping of calcificaions in the posterior central right breast)     Advance Care Planning     Low weight     External hemorrhoids     Chronic atrial fibrillation (H)     Dermatitis due to unknown cause, scalp     Slow transit constipation     Dementia with behavioral disturbance, unspecified dementia type     Frozen joint, 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 finger       Case Management:  I have reviewed the care plan and MDS and do agree with the plan. Patient's desire to return to the community is not assessible due to cognitive impairment. Information reviewed:  Medications, vital signs, orders, and nursing  "notes.    ROS:  Limited secondary to cognitive impairment but today pt reports no pain or distress.    Vitals:  /72   Pulse 67   Temp 98.2  F (36.8  C)   Resp 18   Ht 1.549 m (5' 1\")   Wt 55.8 kg (123 lb)   BMI 23.24 kg/m    Body mass index is 23.24 kg/m .  Exam:  GENERAL APPEARANCE: Alert, female seen.  Pale and confused. Able to respond verbally. Poor recall of any recent past events. Pleasant.      HEAD: Normocephalic. No facial asymmetry.     EYES: conjunctiva and lids normal, Wearing eyeglasses.  RESP: Quiet, effortless respirations. No cough. CTA bilaterally.  CV: Irregular rhythm, Grade 2/6 cardiac murmur. No LE edema. DP pulses +1 bilaterally..  ABDOMEN: Soft rounded abdomen. BS's positive all 4 quadrants. No tenderness with palpation. No guarding.   M/S:. Able to open all five fingers of right hand.  Left hand second and third fingers are contracted in frozen position with swelling of 1st metacarpal joint.  Unable to open finger manually.   NEURO: Oriented to person only.  Numerous repetitive statements. No tremors  Purposeful movement of all extremities without focal weakness.  SKIN:  No pedal wounds and nails well trimmed  PSYCH:  Pleasant and talkative.       Lab/Diagnostic data:   CBC RESULTS:   Recent Labs   Lab Test 11/05/18 05/10/18 11/06/17 11/03/17 02/13/15   WBC  --   --  7.4 11.2* 7.2   RBC  --   --  3.59* 3.49* 3.76*   HGB 12.8 12.8 11.9 11.8 12.5   HCT  --   --  37.3 36.4 38.5   MCV  --   --  103.9* 104.3* 102*   MCH  --   --   --   --  33.2*   MCHC  --   --   --   --  32.6   RDW  --   --  13.2 13.4 13.8   PLT  --   --  342 243 223       Last Basic Metabolic Panel:  Recent Labs   Lab Test 11/05/18 05/10/18    143   POTASSIUM 4.4 4.2   CHLORIDE 106 105   BRIJESH 9.7 9.1   CO2 28 28   BUN 16 18   CR 0.77 0.67   GLC 77 89       Liver Function Studies -   Recent Labs   Lab Test 05/10/18 10/17/16   PROTTOTAL 6.2* 5.9   ALBUMIN 3.5 3.4   BILITOTAL 1.1 1.4   ALKPHOS 96 68   AST 15 21 "   ALT <10 13     Family Communication: Met daughter following visit with her mother last week.  No new concerns.    ASSESSMENT/PLAN  (I10) Benign essential hypertension  (primary encounter diagnosis)  Comment: BP goal: ,150/90, at goal  Plan: Continue current POC.  Check Hgb and BMP next lab day.    (F03.91) Dementia with behavioral disturbance, unspecified dementia type  Comment: Slowly advancing  Plan: Continue skilled nsg home care in secure memory care unit.    (I48.2) Chronic atrial fibrillation (H)  Comment: Chronic,rate controlled  Plan: Continue metoprolol for rate control and ASA for anticoagulation.  Is not a coumadin or Eliquis candidate due to fall risk.    (G40.909) Seizure disorder (H)  Comment: Chronic, but no recent seizures  Plan: Continue current dose of Keppra    (I73.9) PVD (peripheral vascular disease) (H)  Comment: Chronic  Plan: Continue to monitor pedal skin.  Continue ASA.    Electronically signed by:  RAFAEL Flowers CNP

## 2019-05-30 NOTE — TELEPHONE ENCOUNTER
[facility] TELEPHONE ENCOUNTER    Chief Complaint   Patient presents with     increased confusion consistent with previous UTI       Yeni Pérez is a 89 year old  (2/9/1927),Nurse called today to report: patient having increased weakness and increased confusion.  Consistent with UTI sx    ASSESSMENT/PLAN  Increased weakness and confusion    Collect UA/UC    RAFAEL Rivera CNP

## 2019-06-01 NOTE — TELEPHONE ENCOUNTER
"Called with f/u UA results (rather bland - see below) which were ordered earlier in the day d/t increased lethargy and confusion from baseline in the setting of advanced dementia. Nurse says that Yeni denies dysuria and isn't having increased urinary frequency (though dementia limits history). The sample was obtained \"clean catch\" though she is incontinent. No change in clinical status - still is more confused and fatigued than baseline x 1 week though vitals stable and she \"may be a little better today\". Oral intake is stable though chronically low. No other infectious symptoms such as cough. No fever. UA results positive for: trace leukocytes, 8 WBC, 1 RBC.    PLAN: Mutually decided to continue to monitor at this time with no new orders. RN sounds to know Yeni well and is ok with this plan. UA results rather bland. RNs to update us if change in clinical status. I will route note to PCP Cassie Gonzalez, RAFAEL CNP.    Tracie Mari, DO   "

## 2019-06-13 NOTE — PROGRESS NOTES
[facility]  [facility] Medical Record Number:  [id number]  Place of Service where encounter took place:  [facility] ([facility]) [[id number]]  Chief Complaint   Patient presents with     RECHECK       HPI:    Yeni Pérez  is a 89 year old (2/9/1927), who is being seen today for an episodic care visit.  HPI information obtained from: facility chart records, facility staff and [facility] chart reviewPt is unable to participate in ROS due to cognitive loss. Today's concern is:  Moderate protein-calorie malnutrition (H)  Pt's appetite declining and will often refuse to eat.  Resistant to assistance with most cares.    Generalized muscle weakness  5/30, pt had several days of noted increase in weakness.  UC showed no acute infection.  Nsg notes that she has improved but has not yet returned to baseline.  Pt denies any weakness today, but she has not been up for any meals.    Chronic atrial fibrillation (H)  Pulse ranges in past week: 68-78.  No reports of chest pain    Dementia with behavioral disturbance, unspecified dementia type  Advancing disease, which she lacks insight into any functional or cognitive loss.  Very resistive to staff assist with basic cares.  Daughter often helps with showers.    Past Medical and Surgical History reviewed in Epic today.    MEDICATIONS:  Current Outpatient Medications   Medication Sig Dispense Refill     ACETAMINOPHEN PO Take 650 mg by mouth every 4 hours as needed for pain       allopurinol (ZYLOPRIM) 300 MG tablet Take 300 mg by mouth daily       aspirin 81 MG EC tablet Take 1 tablet (81 mg) by mouth daily 90 tablet 3     bisacodyl (DULCOLAX) 10 MG Suppository Place 1 suppository (10 mg) rectally every 3 days prn 30 suppository      docusate sodium (COLACE) 100 MG capsule Take 1 capsule (100 mg) by mouth every other day 60 capsule      emollient (VANICREAM) cream Apply topically 2 times daily       levETIRAcetam (KEPPRA) 500 MG tablet Take 500 mg by mouth  daily       magnesium hydroxide (MILK OF MAGNESIA) 400 MG/5ML suspension Take 30 mLs by mouth daily as needed for constipation or heartburn        METOPROLOL TARTRATE PO Take 12.5 mg by mouth daily       Misc Natural Products (GLUCOSAMINE CHOND COMPLEX/MSM PO) Take 1 tablet by mouth daily       senna-docusate (SENOKOT-S;PERICOLACE) 8.6-50 MG per tablet Take 2 tablets by mouth At Bedtime And 2 tabs daily prn       Patient Active Problem List   Diagnosis     History of hepatitis A  in 1980     History of hepatitis B in 1980     Cyst on ear, left, 2/25/2013     H/O squamous cell carcinoma of skin, right hand, 11/28/2011     Ganglion cyst, 1st MTP left 3/25/2009     Seizure disorder (H)     H/O renal calculi, 1987     PVC's (premature ventricular contractions)     H/O small bowel obstruction, lysis of adhesions, 1970     Osteoporosis, Bone Density 2013: Femeral neck: -2.6 (L) and -2.3 (R)     Cardiomegaly     Diverticulosis of large intestine without hemorrhage     Bilateral dry eyes     Heart murmur     Ataxia     Chronic fatigue     Benign essential hypertension     Primary osteoarthritis involving multiple joints     PVD (peripheral vascular disease) (H)     Abnormal mammogram (3./12/2015: 4mm grouping of calcificaions in the posterior central right breast)     Advance Care Planning     Low weight     External hemorrhoids     Chronic atrial fibrillation (H)     Dermatitis due to unknown cause, scalp     Slow transit constipation     Dementia with behavioral disturbance, unspecified dementia type     Frozen joint, 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 finger     Protein-calorie malnutrition (H)        Allergies   Allergen Reactions     Boniva [Ibandronic Acid]      Food      PINEAPPLE     Lexapro [Escitalopram]      No Clinical Screening - See Comments      Hexacycline     Zoloft [Sertraline]        REVIEW OF SYSTEMS:  Unobtainable secondary to cognitive impairment.     Objective:  /70   Pulse 78   Temp 98.9  F (37.2  C)   Resp 20    Wt 50.3 kg (111 lb)   BMI 20.97 kg/m    Exam:  GENERAL APPEARANCE: Alert, female seen.  Pale and confused. Able to respond verbally. Poor recall of any recent past events. States that she does not feel weak.    HEAD: Normocephalic. No facial asymmetry.     EYES: conjunctiva and lids normal, Wearing eyeglasses.  RESP: Quiet, effortless respirations. No cough. CTA bilaterally.  CV: Irregular rhythm, Grade 2/6 cardiac murmur. No LE edema. DP pulses +1 bilaterally..  ABDOMEN: Soft rounded abdomen. BS's positive all 4 quadrants. No tenderness with palpation. No guarding.   M/S:. Able to open all five fingers of right hand.  Left hand second and third fingers are contracted in frozen position with swelling of 1st metacarpal joint.  Unable to open finger manually.   NEURO: Oriented to person only.  Numerous repetitive statements. No tremors  Purposeful movement of all extremities without focal weakness.  PSYCH:  Appears fatigue.       Labs:   CBC RESULTS:   Recent Labs   Lab Test 05/20/19 11/05/18 11/06/17 11/03/17 02/13/15   WBC  --   --  7.4 11.2* 7.2   RBC  --   --  3.59* 3.49* 3.76*   HGB 12.3 12.8 11.9 11.8 12.5   HCT  --   --  37.3 36.4 38.5   MCV  --   --  103.9* 104.3* 102*   MCH  --   --   --   --  33.2*   MCHC  --   --   --   --  32.6   RDW  --   --  13.2 13.4 13.8   PLT  --   --  342 243 223       Last Basic Metabolic Panel:  Recent Labs   Lab Test 05/20/19 11/05/18    143   POTASSIUM 4.2 4.4   CHLORIDE 103 106   BRIJESH 9.0 9.7   CO2 21 28   BUN 13 16   CR 0.70 0.77   GLC 87 77     ASSESSMENT/PLAN:  (E44.0) Moderate protein-calorie malnutrition (H)  (primary encounter diagnosis)  Comment: Ongoing, but difficult to intervene due to her resistiveness to assistance  Plan:  Start NDS 4 oz bid and monitor weights.  If further weight loss or weakness occurs, recommend hospice care.    (M62.81) Generalized muscle weakness  Comment: Improved, but not at baseline  Plan: CBC and BMP MOnday.    (I48.2) Chronic atrial  fibrillation (H)  Comment: Chronic, not a coumadin candidate due to fall risk  Plan: Continue metoprolol for rate control and ASA for anticoagulation.    (F03.91) Dementia with behavioral disturbance, unspecified dementia type  Comment: Chronic  Plan: Continue 24 hour skilled nsg care in memory care unit.  Monitor for expected progression.    Electronically signed by:  RAFAEL Flowers CNP

## 2019-06-14 PROBLEM — E46 PROTEIN-CALORIE MALNUTRITION (H): Status: ACTIVE | Noted: 2019-01-01

## 2019-06-26 NOTE — PROGRESS NOTES
[facility]  [facility] Medical Record Number:  [id number]  Place of Service where encounter took place:  [facility] ([facility] [[id number]]  Chief Complaint   Patient presents with     RECHECK       HPI:    Yeni Pérez  is a 89 year old (2/9/1927), who is being seen today for an episodic care visit.  HPI information obtained from: facility chart records, facility staff and [facility] chart review   Pt is unreliable historian due to cognitive loss. Today's concern is:  Moderate protein-calorie malnutrition (H)  Current weight reflects 8 lbs weight loss since February if it is accurate and 4 lbs weight loss in one week. Can be resistive to taking meds and eating at times.    Generalized muscle weakness  Self transfers from chair to bed, but no longer ambulates. Fell on 6/14 during self transfer.No injuries reported  Today pt denies pain in the moment     Chronic atrial fibrillation (H)  No reports of heart rates >100/min    Dementia with behavioral disturbance, unspecified dementia type  Advancing disease and requires assistance with basic adl's such as hygiene and medications.  Resides on secure memory care unit.    Benign essential hypertension  BP ranges in past month: 12/73 - 134/79.    Past Medical and Surgical History reviewed in Epic today.    MEDICATIONS:  Current Outpatient Medications   Medication Sig Dispense Refill     ACETAMINOPHEN PO Take 650 mg by mouth every 4 hours as needed for pain       allopurinol (ZYLOPRIM) 300 MG tablet Take 300 mg by mouth daily       aspirin 81 MG EC tablet Take 1 tablet (81 mg) by mouth daily 90 tablet 3     bisacodyl (DULCOLAX) 10 MG Suppository Place 1 suppository (10 mg) rectally every 3 days prn 30 suppository      docusate sodium (COLACE) 100 MG capsule Take 1 capsule (100 mg) by mouth every other day 60 capsule      emollient (VANICREAM) cream Apply topically 2 times daily       levETIRAcetam (KEPPRA) 500 MG tablet Take 500 mg by mouth  daily       magnesium hydroxide (MILK OF MAGNESIA) 400 MG/5ML suspension Take 30 mLs by mouth daily as needed for constipation or heartburn        Misc Natural Products (GLUCOSAMINE CHOND COMPLEX/MSM PO) Take 1 tablet by mouth daily       senna-docusate (SENOKOT-S;PERICOLACE) 8.6-50 MG per tablet Take 2 tablets by mouth At Bedtime And 2 tabs daily prn       METOPROLOL TARTRATE PO Take 12.5 mg by mouth daily       Patient Active Problem List   Diagnosis     History of hepatitis A  in 1980     History of hepatitis B in 1980     Cyst on ear, left, 2/25/2013     H/O squamous cell carcinoma of skin, right hand, 11/28/2011     Ganglion cyst, 1st MTP left 3/25/2009     Seizure disorder (H)     H/O renal calculi, 1987     PVC's (premature ventricular contractions)     H/O small bowel obstruction, lysis of adhesions, 1970     Osteoporosis, Bone Density 2013: Femeral neck: -2.6 (L) and -2.3 (R)     Cardiomegaly     Diverticulosis of large intestine without hemorrhage     Bilateral dry eyes     Heart murmur     Ataxia     Chronic fatigue     Benign essential hypertension     Primary osteoarthritis involving multiple joints     PVD (peripheral vascular disease) (H)     Abnormal mammogram (3./12/2015: 4mm grouping of calcificaions in the posterior central right breast)     Advance Care Planning     Low weight     External hemorrhoids     Chronic atrial fibrillation (H)     Dermatitis due to unknown cause, scalp     Slow transit constipation     Dementia with behavioral disturbance, unspecified dementia type     Frozen joint, 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 finger     Protein-calorie malnutrition (H)        Allergies   Allergen Reactions     Boniva [Ibandronic Acid]      Food      PINEAPPLE     Lexapro [Escitalopram]      No Clinical Screening - See Comments      Hexacycline     Zoloft [Sertraline]      REVIEW OF SYSTEMS:  Limited secondary to cognitive impairment but today pt reports no pain or distress    Objective:  /68   Pulse 76    "Temp 98.8  F (37.1  C)   Resp 18   Wt 48.5 kg (107 lb)   BMI 20.22 kg/m    Exam:  GENERAL APPEARANCE: Alert, female seen.  Pale and confused. Able to respond verbally. Poor recall of any recent past events. States that she does not feel weak, just \"useless today\".     HEAD: Normocephalic. No facial asymmetry.     EYES: conjunctiva and lids normal, Wearing eyeglasses.  RESP: Quiet, effortless respirations. No cough. CTA bilaterally.  CV: Irregular rhythm, Grade 2/6 cardiac murmur. No LE edema. DP pulses +1 bilaterally..  ABDOMEN: Soft rounded abdomen. BS's positive all 4 quadrants. No tenderness with palpation. No guarding.   M/S:. Able to open all five fingers of right hand.  Left hand second and third fingers are contracted in frozen position with swelling of 1st metacarpal joint.  Unable to open finger manually.   NEURO: Oriented to person only.  Numerous repetitive statements. No tremors  Purposeful movement of all extremities without focal weakness.  PSYCH:  Appears sad today with flat affect, but denies feelings of sadness.      Labs:   CBC RESULTS:   Recent Labs   Lab Test 05/20/19 11/05/18 11/06/17 11/03/17 02/13/15   WBC  --   --  7.4 11.2* 7.2   RBC  --   --  3.59* 3.49* 3.76*   HGB 12.3 12.8 11.9 11.8 12.5   HCT  --   --  37.3 36.4 38.5   MCV  --   --  103.9* 104.3* 102*   MCH  --   --   --   --  33.2*   MCHC  --   --   --   --  32.6   RDW  --   --  13.2 13.4 13.8   PLT  --   --  342 243 223       Last Basic Metabolic Panel:  Recent Labs   Lab Test 06/17/19 05/20/19    138   POTASSIUM 4.0 4.2   CHLORIDE 102 103   BRIJESH 9.0 9.0   CO2 28 21   BUN 16 13   CR 0.69 0.70   GLC 89 87     GFR Estimate   Date Value Ref Range Status   06/17/2019 >60 >60 ml/min/1.73m2 Final   05/20/2019 >60 >60 ml/min/1.73m2 Final   11/05/2018 >60 >60 ml/min/1.73m2 Final       ASSESSMENT/PLAN:  (E44.0) Moderate protein-calorie malnutrition (H)  (primary encounter diagnosis)  Comment: Chronic, but current weight may be in " error  Plan: Will get reweigh for pt today.    (M62.81) Generalized muscle weakness  Comment: Chronic  Plan: Continue to monitor on memory care unit.    (I48.2) Chronic atrial fibrillation (H)  Comment: Chronic, rate controlled  Plan: Continue current POC    (F03.91) Dementia with behavioral disturbance, unspecified dementia type  Comment: Chronic and advancing  Plan: Continue current POC on secure 24 hr memory care unit.    (I10) Benign essential hypertension  Comment: BP goal; <150/90, at goal  Plan: Continue current POC.    Electronically signed by:  RAFAEL Flowers CNP

## 2019-07-10 NOTE — PROGRESS NOTES
Patient was seen for a regulatory long-term care visit.      Course reviewed with nurse practitioner.    Patient's mental and functional status continue to gradually decline.  Patient is occasionally resistive to cares and medications.  Oral intake has been diminished.  There is been a gradual weight loss over the last several months.    Vital signs have been stable.  Blood pressure has been stable.    Patient is alert, lying in bed, appears guarded and somewhat suspicious.  She is oriented to person.  Lungs clear  CV irregular, heart rate 70s  Abdomen soft    Assessment    Dementia, progressive  Gradual weight loss secondary to above, with moderate protein calorie malnutrition  Chronic atrial fibrillation, heart rate controlled.  Seizure disorder, stable on Keppra, without recent clinically evident episodes.    Plan  Continue current medications and cares.

## 2019-09-01 NOTE — TELEPHONE ENCOUNTER
Resident having foul loose stools and asking for test for c-diff    Ok to send in stool sample for C diff    Electronically signed by Maria G Puente RN, CNP

## 2019-09-02 NOTE — PROGRESS NOTES
[facility]  Chief Complaint   Patient presents with     Annual Comprehensive Nursing Home       [facility] Record Number:  [id number]  Place of Service where encounter took place:  No question data found.    HPI:    Yeni Pérez is a 89 year old  (2/9/1927), who is being seen today for an annual comprehensive visit.  HPI information obtained from: facility chart records, facility staff and [facility] chart review.  Pt is unreliable historian due to cognitive loss.  Today's concerns are:  Loose stools  Nsg contacted on call yesterday to report two days of watery,  Foul smelling BM's.  Order given for stool culture, but none had been collected yet.  Today, pt denies having any loose stools, but has dementia and is unreliable. Nsg notes increased fatigue.    Moderate protein-calorie malnutrition (H)  Weight remains low.  She has not been up for breakfast as of mid morning, but this is not unusual for her.  No reports of emesis.  Loose stools as noted above.    Chronic atrial fibrillation (H)  No reports of heat rates >100/min.  Remains on metoprolol.  On ASA for anticoagulation as she is a fall risk and therefore no coumadin or eliquis.    Dementia with behavioral disturbance, unspecified dementia type  Resides on secure memory care unit.  Continues to self transfer and she can be very resistive to care interventions.      Benign essential hypertension  BP ranges in past week: 109/62 - 118/74.   No reports of chest pain    Seizure disorder (H)  Remains on lamictal.  No reports of obvious seizure activity.    ALLERGIES: Boniva [ibandronic acid]; Food; Lexapro [escitalopram]; No clinical screening - see comments; and Zoloft [sertraline]  PROBLEM LIST:  Patient Active Problem List   Diagnosis     History of hepatitis A  in 1980     History of hepatitis B in 1980     Cyst on ear, left, 2/25/2013     H/O squamous cell carcinoma of skin, right hand, 11/28/2011     Ganglion cyst, 1st MTP left  3/25/2009     Seizure disorder (H)     H/O renal calculi, 1987     PVC's (premature ventricular contractions)     H/O small bowel obstruction, lysis of adhesions, 1970     Osteoporosis, Bone Density 2013: Femeral neck: -2.6 (L) and -2.3 (R)     Cardiomegaly     Diverticulosis of large intestine without hemorrhage     Bilateral dry eyes     Heart murmur     Ataxia     Chronic fatigue     Benign essential hypertension     Primary osteoarthritis involving multiple joints     PVD (peripheral vascular disease) (H)     Abnormal mammogram (3./12/2015: 4mm grouping of calcificaions in the posterior central right breast)     Advance Care Planning     Low weight     External hemorrhoids     Chronic atrial fibrillation (H)     Dermatitis due to unknown cause, scalp     Slow transit constipation     Dementia with behavioral disturbance, unspecified dementia type     Frozen joint, 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 finger     Protein-calorie malnutrition (H)     PAST MEDICAL HISTORY:  has a past medical history of Abnormal mammogram (3./12/2015: 4mm grouping of calcificaions in the posterior central right breast) (11/9/2015), Advanced directives, counseling/discussion, POLST completed 11/9/15, DNR/DNI (11/9/2015), Ataxia (11/9/2015), Benign essential hypertension (11/9/2015), Bilateral dry eyes (11/9/2015), Cardiomegaly (11/9/2015), Chronic atrial fibrillation (H) (4/10/2017), Chronic fatigue (11/9/2015), Cyst on ear, left, 2/25/2013 (11/9/2015), Dementia with behavioral disturbance, unspecified dementia type (1/25/2018), Dermatitis due to unknown cause, scalp (6/19/2017), Dislocated shoulder, right, sequela (11/3/2017), Diverticulosis of large intestine without hemorrhage (11/9/2015), External hemorrhoids (11/14/2016), Frozen joint, left 3rd finger (4/2/2018), Ganglion cyst, 1st MTP left 3/25/2009 (11/9/2015), H/O renal calculi, 1987 (11/9/2015), H/O small bowel obstruction, lysis of adhesions, 1970 (11/9/2015), H/O squamous cell carcinoma of  skin, right hand, 11/28/2011 (11/9/2015), Heart murmur (11/9/2015), History of hepatitis A  in 1980 (11/5/2015), History of hepatitis B in 1980 (11/5/2015), Low weight (12/4/2015), Major depression in complete remission (H) (11/9/2015), Osteoporosis, Bone Density 2013: Femeral neck: -2.6 (L) and -2.3 (R) (11/9/2015), Primary osteoarthritis involving multiple joints (11/9/2015), PVC's (premature ventricular contractions) (11/9/2015), PVD (peripheral vascular disease) (H) (11/9/2015), Seizure disorder (H) (11/9/2015), Senile dementia without behavioral disturbance (12/4/2015), Shingles, and Slow transit constipation (11/20/2017).  PAST SURGICAL HISTORY:  has a past surgical history that includes appendectomy; Cystectomy ovarian benign; LAP,LYSIS OF ADHESIONS; Bunionectomy; and cataract iol, rt/lt.  FAMILY HISTORY: family history includes Colon Cancer in her mother; Depression in her daughter; Leukemia in her brother; Myocardial Infarction in her father; Ovarian Cancer in her sister.  SOCIAL HISTORY:  reports that she has quit smoking. She does not have any smokeless tobacco history on file. She reports that she drinks alcohol.  IMMUNIZATIONS:  Most Recent Immunizations   Administered Date(s) Administered     Influenza (High Dose) 3 valent vaccine 11/09/2017     Influenza (IIV3) PF 10/16/2018     Pneumo Conj 13-V (2010&after) 12/02/2013     Pneumococcal 23 valent 03/08/2004     Tetanus 07/01/2014     Zoster vaccine, live 02/01/2007   Deferred Date(s) Deferred     TDAP Vaccine (Boostrix) 09/02/2019     Above immunizations pulled from [facility]. [facility] and facility records also reconciled.  Future immunizations needed:  yearly influenza per facility protocol  Pt has refused TDAP.  MEDICATIONS:  Current Outpatient Medications   Medication Sig Dispense Refill     allopurinol (ZYLOPRIM) 300 MG tablet Take 300 mg by mouth daily       aspirin 81 MG EC tablet Take 1 tablet (81 mg) by mouth daily 90 tablet 3     bisacodyl  (DULCOLAX) 10 MG Suppository Place 1 suppository (10 mg) rectally every 3 days prn 30 suppository      docusate sodium (COLACE) 100 MG capsule Take 1 capsule (100 mg) by mouth every other day 60 capsule      levETIRAcetam (KEPPRA) 500 MG tablet Take 500 mg by mouth daily       magnesium hydroxide (MILK OF MAGNESIA) 400 MG/5ML suspension Take 30 mLs by mouth daily as needed for constipation or heartburn        METOPROLOL TARTRATE PO Take 12.5 mg by mouth daily       Misc Natural Products (GLUCOSAMINE CHOND COMPLEX/MSM PO) Take 1 tablet by mouth daily       senna-docusate (SENOKOT-S;PERICOLACE) 8.6-50 MG per tablet Take 2 tablets by mouth At Bedtime And 2 tabs daily prn       ACETAMINOPHEN PO Take 650 mg by mouth every 4 hours as needed for pain       emollient (VANICREAM) cream Apply topically 2 times daily       Medications reviewed:  Medications reconciled to facility chart and changes were made to reflect current medications as identified as above med list. Below are the changes that were made:   Medications stopped since last EPIC medication reconciliation:   There are no discontinued medications    Case Management:  I have reviewed the facility/SNF care plan/MDS which was done 7/25/19, including the falls risk, nutrition and pain screening. I also reviewed the current immunizations, and preventive care..Future cancer screening is not clinically indicated secondary to age/goals of care Patient's desire to return to the community is not assessible due to cognitive impairment. Current Level of Care is appropriate.    Care Conference  The most recent care conference was done on 8/15/19 date and I have reviewed the notes.     Advance Directive Discussion:    I reviewed the current advanced directives as reflected in EPIC, the POLST and the facility chart, and verified the congruency of orders. I contacted the first party son Manolo and discussed the status & plan of Care. He recognizes that she has no insight into her  "physical issues and that she is very resistant to care interventions.  If she refuses to eat, she will not eat.  Discussed comfort focus, which he believes would be her goal, but will talk about this with his siblings.  I will call him back tomorrow following the receipt of her test results.  I did not due to cognitive impairment review the advance directives with the resident.     Team Discussion:  I communicated with the appropriate disciplines involved with the Plan of Care:   Nursing      Patient Goal:  Patient's goal is family's/responsible party's goal for the patient is likely pain control and comfort and they are now discussing this and will let me know what their decision is..    Information reviewed:  Medications, vital signs, orders, and nursing notes.    ROS:  Limited secondary to cognitive impairment but today pt reports no pain or distress, but yet refusing to get up and eat and states that she wants to \"rest\".    Exam:  /65   Pulse 71   Temp 97.8  F (36.6  C)   Resp 18   Wt 47.6 kg (105 lb)   BMI 19.84 kg/m    GENERAL APPEARANCE: Alert, female seen.  Pale and confused. Resting in bed and participated minimally with exam.  Stated that she was fine and saw no reason for exam.   Poor recall of any recent past events. States that she has not had any diarrhea and feels fine.     HEAD: Normocephalic. No facial asymmetry.     NECK  Trachea midline.  No palpable lymphadenopathy.  EYES: conjunctiva and lids normal, Wearing eyeglasses.  RESP: Quiet, effortless respirations. No cough. CTA bilaterally.  CV: Irregular rhythm, Grade 2/6 cardiac murmur. No LE edema. DP pulses +1 bilaterally..  ABDOMEN: Soft rounded abdomen. BS's positive all 4 quadrants. No tenderness with palpation. No guarding.   M/S:. Able to open all five fingers of right hand.  Left hand second and third fingers are contracted in frozen position with swelling of 1st metacarpal joint.  Unable to open finger manually.   NEURO: Oriented " to person only.  Numerous repetitive statements. No tremors  Purposeful movement of all extremities without focal weakness.  SKIN:  Numerous bruises on lower legs with small skin tear on left shin.  Denies Pain.  Toenails trimmed.  No pedal wounds.  PSYCH:  Appears sad today with flat affect, but denies feelings of sadness.      Lab/Diagnostic data:      CBC RESULTS:   Recent Labs   Lab Test 06/17/19 05/20/19 11/06/17 02/13/15   WBC 7.9  --  7.4 7.2   RBC 3.43*  --  3.59* 3.76*   HGB 11.5* 12.3 11.9 12.5   HCT 35.2  --  37.3 38.5   .6*  --  103.9* 102*   MCH 33.5*  --   --  33.2*   MCHC 32.7  --   --  32.6   RDW 13.6  --  13.2 13.8     --  342 223       Last Basic Metabolic Panel:  Recent Labs   Lab Test 06/17/19 05/20/19    138   POTASSIUM 4.0 4.2   CHLORIDE 102 103   BRIJESH 9.0 9.0   CO2 28 21   BUN 16 13   CR 0.69 0.70   GLC 89 87       Liver Function Studies -   Recent Labs   Lab Test 05/10/18 10/17/16   PROTTOTAL 6.2* 5.9   ALBUMIN 3.5 3.4   BILITOTAL 1.1 1.4   ALKPHOS 96 68   AST 15 21   ALT <10 13       ASSESSMENT/PLAN  (R19.5) Loose stools  (primary encounter diagnosis)  Comment: Ongoing  Plan: Stool specimen to r/o C Diff.   discontinue Senna and colace. CBC, BMP as well as LFT's tomorrow as pt's medical history notes hepatitis.  Family is discussing goals of care    (E44.0) Moderate protein-calorie malnutrition (H)  Comment: Ongoing  Plan: Continue to offer nutrition as well as supplements, but pt is very resistant to direction and often will only eat when she desires.    (I48.2) Chronic atrial fibrillation (H)  Comment: Chronic, rate controlled  Plan: Continue current POC    (F03.91) Dementia with behavioral disturbance, unspecified dementia type  Comment: Advancing  Plan: Continue 24 hour skilled nsg care on memory care unit.  Family is discussing goals of care.  If comfort is the goal, consider hospice care.    (I10) Benign essential hypertension  Comment: Based on JNC-8 goals,   patients age of 89 year old, no presence of diabetes or CKD, and goals of care goal BP is <150/90 mm Hg. Patient is stable with current plan of care and routine assessment..    (G40.909) Seizure disorder (H)  Comment: Chronic, no obvious recent seizures  Plan: Continue medications and monitoring.     Electronically signed by:  RAFAEL Flowers CNP

## 2019-09-02 NOTE — LETTER
9/2/2019        RE: Yeni Pérez  C/o [facility]  Yeni Pérez  [facility] [address]        [place] [facility]  Chief Complaint   Patient presents with     Annual Comprehensive Nursing Home       [place] Medical Record Number:  [id number]  Place of Service where encounter took place:  No question data found.    HPI:    Yeni Pérez is a 89 year old  (2/9/1927), who is being seen today for an annual comprehensive visit.  HPI information obtained from: facility chart records, facility staff and [facility] chart review.  Pt is unreliable historian due to cognitive loss.  Today's concerns are:  Loose stools  Nsg contacted on call yesterday to report two days of watery,  Foul smelling BM's.  Order given for stool culture, but none had been collected yet.  Today, pt denies having any loose stools, but has dementia and is unreliable. Nsg notes increased fatigue.    Moderate protein-calorie malnutrition (H)  Weight remains low.  She has not been up for breakfast as of mid morning, but this is not unusual for her.  No reports of emesis.  Loose stools as noted above.    Chronic atrial fibrillation (H)  No reports of heat rates >100/min.  Remains on metoprolol.  On ASA for anticoagulation as she is a fall risk and therefore no coumadin or eliquis.    Dementia with behavioral disturbance, unspecified dementia type  Resides on secure memory care unit.  Continues to self transfer and she can be very resistive to care interventions.      Benign essential hypertension  BP ranges in past week: 109/62 - 118/74.   No reports of chest pain    Seizure disorder (H)  Remains on lamictal.  No reports of obvious seizure activity.    ALLERGIES: Boniva [ibandronic acid]; Food; Lexapro [escitalopram]; No clinical screening - see comments; and Zoloft [sertraline]  PROBLEM LIST:  Patient Active Problem List   Diagnosis     History of hepatitis A  in 1980     History of hepatitis B in 1980     Cyst on ear, left,  2/25/2013     H/O squamous cell carcinoma of skin, right hand, 11/28/2011     Ganglion cyst, 1st MTP left 3/25/2009     Seizure disorder (H)     H/O renal calculi, 1987     PVC's (premature ventricular contractions)     H/O small bowel obstruction, lysis of adhesions, 1970     Osteoporosis, Bone Density 2013: Femeral neck: -2.6 (L) and -2.3 (R)     Cardiomegaly     Diverticulosis of large intestine without hemorrhage     Bilateral dry eyes     Heart murmur     Ataxia     Chronic fatigue     Benign essential hypertension     Primary osteoarthritis involving multiple joints     PVD (peripheral vascular disease) (H)     Abnormal mammogram (3./12/2015: 4mm grouping of calcificaions in the posterior central right breast)     Advance Care Planning     Low weight     External hemorrhoids     Chronic atrial fibrillation (H)     Dermatitis due to unknown cause, scalp     Slow transit constipation     Dementia with behavioral disturbance, unspecified dementia type     Frozen joint, 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 finger     Protein-calorie malnutrition (H)     PAST MEDICAL HISTORY:  has a past medical history of Abnormal mammogram (3./12/2015: 4mm grouping of calcificaions in the posterior central right breast) (11/9/2015), Advanced directives, counseling/discussion, POLST completed 11/9/15, DNR/DNI (11/9/2015), Ataxia (11/9/2015), Benign essential hypertension (11/9/2015), Bilateral dry eyes (11/9/2015), Cardiomegaly (11/9/2015), Chronic atrial fibrillation (H) (4/10/2017), Chronic fatigue (11/9/2015), Cyst on ear, left, 2/25/2013 (11/9/2015), Dementia with behavioral disturbance, unspecified dementia type (1/25/2018), Dermatitis due to unknown cause, scalp (6/19/2017), Dislocated shoulder, right, sequela (11/3/2017), Diverticulosis of large intestine without hemorrhage (11/9/2015), External hemorrhoids (11/14/2016), Frozen joint, left 3rd finger (4/2/2018), Ganglion cyst, 1st MTP left 3/25/2009 (11/9/2015), H/O renal calculi, 1987  (11/9/2015), H/O small bowel obstruction, lysis of adhesions, 1970 (11/9/2015), H/O squamous cell carcinoma of skin, right hand, 11/28/2011 (11/9/2015), Heart murmur (11/9/2015), History of hepatitis A  in 1980 (11/5/2015), History of hepatitis B in 1980 (11/5/2015), Low weight (12/4/2015), Major depression in complete remission (H) (11/9/2015), Osteoporosis, Bone Density 2013: Femeral neck: -2.6 (L) and -2.3 (R) (11/9/2015), Primary osteoarthritis involving multiple joints (11/9/2015), PVC's (premature ventricular contractions) (11/9/2015), PVD (peripheral vascular disease) (H) (11/9/2015), Seizure disorder (H) (11/9/2015), Senile dementia without behavioral disturbance (12/4/2015), Shingles, and Slow transit constipation (11/20/2017).  PAST SURGICAL HISTORY:  has a past surgical history that includes appendectomy; Cystectomy ovarian benign; LAP,LYSIS OF ADHESIONS; Bunionectomy; and cataract iol, rt/lt.  FAMILY HISTORY: family history includes Colon Cancer in her mother; Depression in her daughter; Leukemia in her brother; Myocardial Infarction in her father; Ovarian Cancer in her sister.  SOCIAL HISTORY:  reports that she has quit smoking. She does not have any smokeless tobacco history on file. She reports that she drinks alcohol.  IMMUNIZATIONS:  Most Recent Immunizations   Administered Date(s) Administered     Influenza (High Dose) 3 valent vaccine 11/09/2017     Influenza (IIV3) PF 10/16/2018     Pneumo Conj 13-V (2010&after) 12/02/2013     Pneumococcal 23 valent 03/08/2004     Tetanus 07/01/2014     Zoster vaccine, live 02/01/2007   Deferred Date(s) Deferred     TDAP Vaccine (Boostrix) 09/02/2019     Above immunizations pulled from [facility]. [facility] and facility records also reconciled.  Future immunizations needed:  yearly influenza per facility protocol  Pt has refused TDAP.  MEDICATIONS:  Current Outpatient Medications   Medication Sig Dispense Refill     allopurinol (ZYLOPRIM) 300 MG tablet Take 300 mg  by mouth daily       aspirin 81 MG EC tablet Take 1 tablet (81 mg) by mouth daily 90 tablet 3     bisacodyl (DULCOLAX) 10 MG Suppository Place 1 suppository (10 mg) rectally every 3 days prn 30 suppository      docusate sodium (COLACE) 100 MG capsule Take 1 capsule (100 mg) by mouth every other day 60 capsule      levETIRAcetam (KEPPRA) 500 MG tablet Take 500 mg by mouth daily       magnesium hydroxide (MILK OF MAGNESIA) 400 MG/5ML suspension Take 30 mLs by mouth daily as needed for constipation or heartburn        METOPROLOL TARTRATE PO Take 12.5 mg by mouth daily       Misc Natural Products (GLUCOSAMINE CHOND COMPLEX/MSM PO) Take 1 tablet by mouth daily       senna-docusate (SENOKOT-S;PERICOLACE) 8.6-50 MG per tablet Take 2 tablets by mouth At Bedtime And 2 tabs daily prn       ACETAMINOPHEN PO Take 650 mg by mouth every 4 hours as needed for pain       emollient (VANICREAM) cream Apply topically 2 times daily       Medications reviewed:  Medications reconciled to facility chart and changes were made to reflect current medications as identified as above med list. Below are the changes that were made:   Medications stopped since last EPIC medication reconciliation:   There are no discontinued medications    Case Management:  I have reviewed the facility/SNF care plan/MDS which was done 7/25/19, including the falls risk, nutrition and pain screening. I also reviewed the current immunizations, and preventive care..Future cancer screening is not clinically indicated secondary to age/goals of care Patient's desire to return to the community is not assessible due to cognitive impairment. Current Level of Care is appropriate.    Care Conference  The most recent care conference was done on 8/15/19 date and I have reviewed the notes.     Advance Directive Discussion:    I reviewed the current advanced directives as reflected in EPIC, the POLST and the facility chart, and verified the congruency of orders. I contacted the  "first party son Manolo and discussed the status & plan of Care. He recognizes that she has no insight into her physical issues and that she is very resistant to care interventions.  If she refuses to eat, she will not eat.  Discussed comfort focus, which he believes would be her goal, but will talk about this with his siblings.  I will call him back tomorrow following the receipt of her test results.  I did not due to cognitive impairment review the advance directives with the resident.     Team Discussion:  I communicated with the appropriate disciplines involved with the Plan of Care:   Nursing      Patient Goal:  Patient's goal is family's/responsible party's goal for the patient is likely pain control and comfort and they are now discussing this and will let me know what their decision is..    Information reviewed:  Medications, vital signs, orders, and nursing notes.    ROS:  Limited secondary to cognitive impairment but today pt reports no pain or distress, but yet refusing to get up and eat and states that she wants to \"rest\".    Exam:  /65   Pulse 71   Temp 97.8  F (36.6  C)   Resp 18   Wt 47.6 kg (105 lb)   BMI 19.84 kg/m     GENERAL APPEARANCE: Alert, female seen.  Pale and confused. Resting in bed and participated minimally with exam.  Stated that she was fine and saw no reason for exam.   Poor recall of any recent past events. States that she has not had any diarrhea and feels fine.     HEAD: Normocephalic. No facial asymmetry.     NECK  Trachea midline.  No palpable lymphadenopathy.  EYES: conjunctiva and lids normal, Wearing eyeglasses.  RESP: Quiet, effortless respirations. No cough. CTA bilaterally.  CV: Irregular rhythm, Grade 2/6 cardiac murmur. No LE edema. DP pulses +1 bilaterally..  ABDOMEN: Soft rounded abdomen. BS's positive all 4 quadrants. No tenderness with palpation. No guarding.   M/S:. Able to open all five fingers of right hand.  Left hand second and third fingers " are contracted in frozen position with swelling of 1st metacarpal joint.  Unable to open finger manually.   NEURO: Oriented to person only.  Numerous repetitive statements. No tremors  Purposeful movement of all extremities without focal weakness.  SKIN:  Numerous bruises on lower legs with small skin tear on left shin.  Denies Pain.  Toenails trimmed.  No pedal wounds.  PSYCH:  Appears sad today with flat affect, but denies feelings of sadness.      Lab/Diagnostic data:      CBC RESULTS:   Recent Labs   Lab Test 06/17/19 05/20/19 11/06/17 02/13/15   WBC 7.9  --  7.4 7.2   RBC 3.43*  --  3.59* 3.76*   HGB 11.5* 12.3 11.9 12.5   HCT 35.2  --  37.3 38.5   .6*  --  103.9* 102*   MCH 33.5*  --   --  33.2*   MCHC 32.7  --   --  32.6   RDW 13.6  --  13.2 13.8     --  342 223       Last Basic Metabolic Panel:  Recent Labs   Lab Test 06/17/19 05/20/19    138   POTASSIUM 4.0 4.2   CHLORIDE 102 103   BRIJESH 9.0 9.0   CO2 28 21   BUN 16 13   CR 0.69 0.70   GLC 89 87       Liver Function Studies -   Recent Labs   Lab Test 05/10/18 10/17/16   PROTTOTAL 6.2* 5.9   ALBUMIN 3.5 3.4   BILITOTAL 1.1 1.4   ALKPHOS 96 68   AST 15 21   ALT <10 13       ASSESSMENT/PLAN  (R19.5) Loose stools  (primary encounter diagnosis)  Comment: Ongoing  Plan: Stool specimen to r/o C Diff.   discontinue Senna and colace. CBC, BMP as well as LFT's tomorrow as pt's medical history notes hepatitis.  Family is discussing goals of care    (E44.0) Moderate protein-calorie malnutrition (H)  Comment: Ongoing  Plan: Continue to offer nutrition as well as supplements, but pt is very resistant to direction and often will only eat when she desires.    (I48.2) Chronic atrial fibrillation (H)  Comment: Chronic, rate controlled  Plan: Continue current POC    (F03.91) Dementia with behavioral disturbance, unspecified dementia type  Comment: Advancing  Plan: Continue 24 hour skilled nsg care on memory care unit.  Family is discussing goals of care.  If  comfort is the goal, consider hospice care.    (I10) Benign essential hypertension  Comment: Based on JNC-8 goals,  patients age of 89 year old, no presence of diabetes or CKD, and goals of care goal BP is <150/90 mm Hg. Patient is stable with current plan of care and routine assessment..    (G40.909) Seizure disorder (H)  Comment: Chronic, no obvious recent seizures  Plan: Continue medications and monitoring.     Electronically signed by:  RAFAEL Flowers CNP          Sincerely,        RAFAEL Flowers CNP

## 2019-09-03 NOTE — PROGRESS NOTES
[facility] [facility]  NON-FACE TO FACE PROLONGED SERVICE    Yeni Pérez 2/9/1927    Date of Related Face to Face Service: 9/2/19    INTENT OF SERVICE  This is an extended evaluation of patient's specific problem.    Regarding the following diagnoses:     Loose stools  Weight loss,     Met with daughter Jannet and son Manolo was on the speaker phone to discuss lab resutls of patient Yeni Pérez and her continued decline.  Stool culture did not show CDifficile.  She continues with some loose stools and has a low potassium today and BUN slightly elevated consistent with her declining food and fluids.    The family discussed the situation following my call to Manolo yesterday and all are in agreement to proceed with Hospice care.  Goal of all care interventions are now comfort focused.  SHe has been resistive for some time for assistance with cares and prefers to be by herself.  Now is curled in bed and wanting to be left alone.  She is losing weight.  Hospice choices reviewed and family is opting to enroll patient in [facility] as soon as possible  Met with hospice nurse who was on site and described situation and she will assist with the process of enrollment.    ASSESSMENT/PLAN     Loose stools  Weight loss     Status:  Declining  PLAN:  Enroll in hospice care.     TIME  Total time spent with Non-Face to Face prolonged service 35 min minutes. (12:25 to 1300).    Electronically signed by:  RAFAEL Flowers CNP

## 2019-09-05 PROBLEM — N18.30 CKD (CHRONIC KIDNEY DISEASE) STAGE 3, GFR 30-59 ML/MIN (H): Status: ACTIVE | Noted: 2019-01-01

## 2019-09-05 NOTE — LETTER
9/5/2019        RE: Yeni Pérez  [facility]  Yeni Pérez  [facility] [address]        [place] [facility]  [place] Medical Record Number:  [id number]  Place of Service where encounter took place:  [facility] [[id number]]  Chief Complaint   Patient presents with     RECHECK     Home Care/Hospice       HPI:    Yeni Pérez  is a 89 year old (2/9/1927), who is being seen today for an episodic care visit.  HPI information obtained from: facility chart records, facility staff and [facility] chart review.  Pt cannot participate in ROS due to cognitive loss.. Today's concern is:  Failure to thrive in adult  Son Manolo is here and reports that last evening and this morning, she appears slightly better.  Pt took orange juice and NDS for this examiner but repeatedly asked to be put back to bed, stating that she feels awful.  Denies any specific pain. Had small emesis yesterday.  No further diarrhea.     CKD (chronic kidney disease) stage 3, GFR 30-59 ml/min (H)  Creatinine was 1.00 with an est GFR of 49 on 9/3.     Moderate protein-calorie malnutrition (H)  Continues to appear to lose weight as she is eating very little. Last weight was on 8/30.    Dementia with behavioral disturbance, unspecified dementia type  Resides on secure memory care unit. Now declining with poor intake.  Family present and opted for comfort focus.  Pt has bruises on lower legs and skin tear on left shin.    Hospice care patient  Started on [facility] 48 hours ago.    Past Medical and Surgical History reviewed in Epic today.    MEDICATIONS:  Current Outpatient Medications   Medication Sig Dispense Refill     acetaminophen (TYLENOL) 650 MG suppository Place 650 mg rectally every 6 hours as needed for fever       ACETAMINOPHEN PO Take 650 mg by mouth every 4 hours as needed for pain       atropine 1 % SOLN Place 2 drops under the tongue every 4 hours as needed for secretions       bisacodyl  (DULCOLAX) 10 MG suppository Place 10 mg rectally daily as needed for constipation       bisacodyl (DULCOLAX) 10 MG Suppository Place 1 suppository (10 mg) rectally every 3 days prn 30 suppository      emollient (VANICREAM) cream Apply topically 2 times daily       levETIRAcetam (KEPPRA) 500 MG tablet Take 500 mg by mouth daily       loperamide (IMODIUM A-D) 2 MG tablet Take 1 tablet (2 mg) by mouth 3 times daily as needed for diarrhea       LORazepam (ATIVAN) 2 MG/ML (HIGH CONC) solution Take 0.25 mg by mouth every 4 hours as needed for anxiety       magnesium hydroxide (MILK OF MAGNESIA) 400 MG/5ML suspension Take 30 mLs by mouth daily as needed for constipation or heartburn        METOPROLOL TARTRATE PO Take 12.5 mg by mouth daily       morphine sulfate HIGH CONCENTRATE (ROXANOL) 20 mg/mL (HIGH CONC) solution Take 2.5 mg by mouth every hour as needed for shortness of breath / dyspnea or breakthrough pain       senna-docusate (SENOKOT-S/PERICOLACE) 8.6-50 MG tablet Take 2 tablets by mouth daily as needed for constipation       Misc Natural Products (GLUCOSAMINE CHOND COMPLEX/MSM PO) Take 1 tablet by mouth daily       Patient Active Problem List   Diagnosis     History of hepatitis A  in 1980     History of hepatitis B in 1980     Cyst on ear, left, 2/25/2013     H/O squamous cell carcinoma of skin, right hand, 11/28/2011     Ganglion cyst, 1st MTP left 3/25/2009     Seizure disorder (H)     H/O renal calculi, 1987     PVC's (premature ventricular contractions)     H/O small bowel obstruction, lysis of adhesions, 1970     Osteoporosis, Bone Density 2013: Femeral neck: -2.6 (L) and -2.3 (R)     Cardiomegaly     Diverticulosis of large intestine without hemorrhage     Bilateral dry eyes     Heart murmur     Ataxia     Chronic fatigue     Benign essential hypertension     Primary osteoarthritis involving multiple joints     PVD (peripheral vascular disease) (H)     Abnormal mammogram (3./12/2015: 4mm grouping of  "calcificaions in the posterior central right breast)     Advance Care Planning     Low weight     External hemorrhoids     Chronic atrial fibrillation (H)     Dermatitis due to unknown cause, scalp     Slow transit constipation     Dementia with behavioral disturbance, unspecified dementia type     Frozen joint, 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 finger     Protein-calorie malnutrition (H)     CKD (chronic kidney disease) stage 3, GFR 30-59 ml/min (H)     REVIEW OF SYSTEMS:  Unobtainable secondary to cognitive impairment.     Objective:  /64   Pulse 102   Temp 97.8  F (36.6  C)   Resp 18   Ht 1.549 m (5' 1\")   Wt 47.6 kg (105 lb)   BMI 19.84 kg/m     Exam:  GENERAL APPEARANCE: Alert, ill appearing female seen.  Pale and confused. Up in w/c, but repeatedly asking to return to bed.   HEAD: Normocephalic. No facial asymmetry.     NECK  Trachea midline.  No palpable lymphadenopathy.  EYES: conjunctiva and lids normal, Wearing eyeglasses.  RESP: Quiet, effortless respirations. No cough. CTA bilaterally.  CV: Irregular rhythm with rapid rate. Grade 2/6 cardiac murmur. No LE edema. DP pulses +1 bilaterally..  ABDOMEN: Soft rounded abdomen. BS's positive all 4 quadrants. No tenderness with palpation. No guarding.   NEURO: Oriented to person only.  Numerous repetitive statements. No tremors  During process of attempting to assist with transfer, pt was not able to assist and feet were dragged during transfer.   SKIN:  Numerous bruises on lower legs with small skin tear on left shin. .  PSYCH:  Appears very ill and distressed.  Recognizes her son and called him by name.     Labs:   CBC RESULTS:   Recent Labs   Lab Test 06/17/19 05/20/19 11/06/17 02/13/15   WBC 7.9  --  7.4 7.2   RBC 3.43*  --  3.59* 3.76*   HGB 11.5* 12.3 11.9 12.5   HCT 35.2  --  37.3 38.5   .6*  --  103.9* 102*   MCH 33.5*  --   --  33.2*   MCHC 32.7  --   --  32.6   RDW 13.6  --  13.2 13.8     --  342 223       Last Basic Metabolic Panel:  Recent Labs "   Lab Test 09/03/19 06/17/19    139   POTASSIUM 3.2* 4.0   CHLORIDE 103 102   BRIJESH 9.3 9.0   CO2 26 28   BUN 29* 16   CR 1.00 0.69    89     GFR Estimate   Date Value Ref Range Status   09/03/2019 49 (L) >60 ml/min/1.73m2 Final   06/17/2019 >60 >60 ml/min/1.73m2 Final   05/20/2019 >60 >60 ml/min/1.73m2 Final     Liver Function Studies -   Recent Labs   Lab Test 09/03/19 05/10/18   PROTTOTAL 6.3* 6.2*   ALBUMIN 2.9* 3.5   BILITOTAL 0.8 1.1   ALKPHOS 94 96   AST 13 15   ALT <10 <10     ASSESSMENT/PLAN:  (R62.7) Failure to thrive in adult  (primary encounter diagnosis)  Comment: Ongoing  Plan: Continue hospice care.  Requested Hospice to bring in a BRODA chair.  They will see her this afternoon and evaluate for comfort.     (N18.3) CKD (chronic kidney disease) stage 3, GFR 30-59 ml/min (H)  Comment: Likely ongoing  Plan: Renally adjust dose of medications.  Will not recheck BMP as pt is now on hospice care.    (E44.0) Moderate protein-calorie malnutrition (H)  Comment: Appears to be losing weight  Plan: Continue hospice care.    (F03.91) Dementia with behavioral disturbance, unspecified dementia type  Comment: Advanced  Plan: ongoing   Provide 24 hour skilled nursing care.  MOnitor skin condition carefully due to decline.    (Z51.5) Hospice care patient  Comment: Ongoing  Plan: Continue hospice care.    Total time spent with patient visit at the skilled nursing facility was 38 min including patient visit, review of past records and meeting with son and communication with hospice. Greater than 50% of total time spent with counseling and coordinating care due to counseling patient/resident for 20 minutes on: current plan with decline. and coordination of care with hospice..  Electronically signed by:  RAFAEL Flowers CNP             Sincerely,        RAFAEL Flowers CNP

## 2019-09-05 NOTE — PROGRESS NOTES
[facility]  [place] Medical Record Number:  [id number]  Place of Service where encounter took place:  [facility] ([facility]) [[id number]]  Chief Complaint   Patient presents with     RECHECK     Home Care/Hospice       HPI:    Yeni Pérez  is a 89 year old (2/9/1927), who is being seen today for an episodic care visit.  HPI information obtained from: facility chart records, facility staff and [facility] chart review.  Pt cannot participate in ROS due to cognitive loss.. Today's concern is:  Failure to thrive in adult  Son Manolo is here and reports that last evening and this morning, she appears slightly better.  Pt took orange juice and NDS for this examiner but repeatedly asked to be put back to bed, stating that she feels awful.  Denies any specific pain. Had small emesis yesterday.  No further diarrhea.     CKD (chronic kidney disease) stage 3, GFR 30-59 ml/min (H)  Creatinine was 1.00 with an est GFR of 49 on 9/3.     Moderate protein-calorie malnutrition (H)  Continues to appear to lose weight as she is eating very little. Last weight was on 8/30.    Dementia with behavioral disturbance, unspecified dementia type  Resides on secure memory care unit. Now declining with poor intake.  Family present and opted for comfort focus.  Pt has bruises on lower legs and skin tear on left shin.    Hospice care patient  Started on [facility] 48 hours ago.    Past Medical and Surgical History reviewed in Epic today.    MEDICATIONS:  Current Outpatient Medications   Medication Sig Dispense Refill     acetaminophen (TYLENOL) 650 MG suppository Place 650 mg rectally every 6 hours as needed for fever       ACETAMINOPHEN PO Take 650 mg by mouth every 4 hours as needed for pain       atropine 1 % SOLN Place 2 drops under the tongue every 4 hours as needed for secretions       bisacodyl (DULCOLAX) 10 MG suppository Place 10 mg rectally daily as needed for constipation       bisacodyl (DULCOLAX)  10 MG Suppository Place 1 suppository (10 mg) rectally every 3 days prn 30 suppository      emollient (VANICREAM) cream Apply topically 2 times daily       levETIRAcetam (KEPPRA) 500 MG tablet Take 500 mg by mouth daily       loperamide (IMODIUM A-D) 2 MG tablet Take 1 tablet (2 mg) by mouth 3 times daily as needed for diarrhea       LORazepam (ATIVAN) 2 MG/ML (HIGH CONC) solution Take 0.25 mg by mouth every 4 hours as needed for anxiety       magnesium hydroxide (MILK OF MAGNESIA) 400 MG/5ML suspension Take 30 mLs by mouth daily as needed for constipation or heartburn        METOPROLOL TARTRATE PO Take 12.5 mg by mouth daily       morphine sulfate HIGH CONCENTRATE (ROXANOL) 20 mg/mL (HIGH CONC) solution Take 2.5 mg by mouth every hour as needed for shortness of breath / dyspnea or breakthrough pain       senna-docusate (SENOKOT-S/PERICOLACE) 8.6-50 MG tablet Take 2 tablets by mouth daily as needed for constipation       Misc Natural Products (GLUCOSAMINE CHOND COMPLEX/MSM PO) Take 1 tablet by mouth daily       Patient Active Problem List   Diagnosis     History of hepatitis A  in 1980     History of hepatitis B in 1980     Cyst on ear, left, 2/25/2013     H/O squamous cell carcinoma of skin, right hand, 11/28/2011     Ganglion cyst, 1st MTP left 3/25/2009     Seizure disorder (H)     H/O renal calculi, 1987     PVC's (premature ventricular contractions)     H/O small bowel obstruction, lysis of adhesions, 1970     Osteoporosis, Bone Density 2013: Femeral neck: -2.6 (L) and -2.3 (R)     Cardiomegaly     Diverticulosis of large intestine without hemorrhage     Bilateral dry eyes     Heart murmur     Ataxia     Chronic fatigue     Benign essential hypertension     Primary osteoarthritis involving multiple joints     PVD (peripheral vascular disease) (H)     Abnormal mammogram (3./12/2015: 4mm grouping of calcificaions in the posterior central right breast)     Advance Care Planning     Low weight     External  "hemorrhoids     Chronic atrial fibrillation (H)     Dermatitis due to unknown cause, scalp     Slow transit constipation     Dementia with behavioral disturbance, unspecified dementia type     Frozen joint, 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 finger     Protein-calorie malnutrition (H)     CKD (chronic kidney disease) stage 3, GFR 30-59 ml/min (H)     REVIEW OF SYSTEMS:  Unobtainable secondary to cognitive impairment.     Objective:  /64   Pulse 102   Temp 97.8  F (36.6  C)   Resp 18   Ht 1.549 m (5' 1\")   Wt 47.6 kg (105 lb)   BMI 19.84 kg/m    Exam:  GENERAL APPEARANCE: Alert, ill appearing female seen.  Pale and confused. Up in w/c, but repeatedly asking to return to bed.   HEAD: Normocephalic. No facial asymmetry.     NECK  Trachea midline.  No palpable lymphadenopathy.  EYES: conjunctiva and lids normal, Wearing eyeglasses.  RESP: Quiet, effortless respirations. No cough. CTA bilaterally.  CV: Irregular rhythm with rapid rate. Grade 2/6 cardiac murmur. No LE edema. DP pulses +1 bilaterally..  ABDOMEN: Soft rounded abdomen. BS's positive all 4 quadrants. No tenderness with palpation. No guarding.   NEURO: Oriented to person only.  Numerous repetitive statements. No tremors  During process of attempting to assist with transfer, pt was not able to assist and feet were dragged during transfer.   SKIN:  Numerous bruises on lower legs with small skin tear on left shin. .  PSYCH:  Appears very ill and distressed.  Recognizes her son and called him by name.     Labs:   CBC RESULTS:   Recent Labs   Lab Test 06/17/19 05/20/19 11/06/17 02/13/15   WBC 7.9  --  7.4 7.2   RBC 3.43*  --  3.59* 3.76*   HGB 11.5* 12.3 11.9 12.5   HCT 35.2  --  37.3 38.5   .6*  --  103.9* 102*   MCH 33.5*  --   --  33.2*   MCHC 32.7  --   --  32.6   RDW 13.6  --  13.2 13.8     --  342 223       Last Basic Metabolic Panel:  Recent Labs   Lab Test 09/03/19 06/17/19    139   POTASSIUM 3.2* 4.0   CHLORIDE 103 102   BRIJSEH 9.3 9.0   CO2 26 28 "   BUN 29* 16   CR 1.00 0.69    89     GFR Estimate   Date Value Ref Range Status   09/03/2019 49 (L) >60 ml/min/1.73m2 Final   06/17/2019 >60 >60 ml/min/1.73m2 Final   05/20/2019 >60 >60 ml/min/1.73m2 Final     Liver Function Studies -   Recent Labs   Lab Test 09/03/19 05/10/18   PROTTOTAL 6.3* 6.2*   ALBUMIN 2.9* 3.5   BILITOTAL 0.8 1.1   ALKPHOS 94 96   AST 13 15   ALT <10 <10     ASSESSMENT/PLAN:  (R62.7) Failure to thrive in adult  (primary encounter diagnosis)  Comment: Ongoing  Plan: Continue hospice care.  Requested Hospice to bring in a BRODA chair.  They will see her this afternoon and evaluate for comfort.     (N18.3) CKD (chronic kidney disease) stage 3, GFR 30-59 ml/min (H)  Comment: Likely ongoing  Plan: Renally adjust dose of medications.  Will not recheck BMP as pt is now on hospice care.    (E44.0) Moderate protein-calorie malnutrition (H)  Comment: Appears to be losing weight  Plan: Continue hospice care.    (F03.91) Dementia with behavioral disturbance, unspecified dementia type  Comment: Advanced  Plan: ongoing   Provide 24 hour skilled nursing care.  MOnitor skin condition carefully due to decline.    (Z51.5) Hospice care patient  Comment: Ongoing  Plan: Continue hospice care.    Total time spent with patient visit at the skilled nursing facility was 38 min including patient visit, review of past records and meeting with son and communication with hospice. Greater than 50% of total time spent with counseling and coordinating care due to counseling patient/resident for 20 minutes on: current plan with decline. and coordination of care with hospice..  Electronically signed by:  RAFAEL Flowers CNP

## 2019-09-06 NOTE — PROGRESS NOTES
[place] [facility]  [place] Medical Record Number:  [id number]  Place of Service where encounter took place:  [facility] ([facility]) [[id number]]  Chief Complaint   Patient presents with     RECHECK     Home Care/Hospice     HPI:    Yeni Pérez  is a 89 year old (2/9/1927), who is being seen today for an episodic care visit.  HPI information obtained from: facility chart records, facility staff and [facility] chart review  Pr unable to participate due to cognitive & physical decline. Today's concern is:  Failure to thrive in adult  Much more comfortable today with small dose of morphine every 8 hours.  Does recognize her son.  Taking only sips of fluid.  Denies pain today.    Moderate protein-calorie malnutrition (H)  Appears to be losing weight, but now on hospice and no weight since last week.    Dementia with behavioral disturbance, unspecified dementia type  Has resided on 24 hour skilled nursing unit due to her advancing dementia.  Now bedbound due to current decline. Has skin tear on left lower leg and bruising, but no reports of other skin issues.    Hospice care patient  On [facility] care.     Past Medical and Surgical History reviewed in Epic today.    MEDICATIONS:  Current Outpatient Medications   Medication Sig Dispense Refill     acetaminophen (TYLENOL) 650 MG suppository Place 650 mg rectally every 6 hours as needed for fever       ACETAMINOPHEN PO Take 650 mg by mouth every 4 hours as needed for pain       atropine 1 % SOLN Place 2 drops under the tongue every 4 hours as needed for secretions       bisacodyl (DULCOLAX) 10 MG suppository Place 10 mg rectally daily as needed for constipation       bisacodyl (DULCOLAX) 10 MG Suppository Place 1 suppository (10 mg) rectally every 3 days prn 30 suppository      emollient (VANICREAM) cream Apply topically 2 times daily       levETIRAcetam (KEPPRA) 500 MG tablet Take 500 mg by mouth daily       loperamide (IMODIUM A-D) 2 MG  tablet Take 1 tablet (2 mg) by mouth 3 times daily as needed for diarrhea       LORazepam (ATIVAN) 2 MG/ML (HIGH CONC) solution Take 0.25 mg by mouth every 4 hours as needed for anxiety       magnesium hydroxide (MILK OF MAGNESIA) 400 MG/5ML suspension Take 30 mLs by mouth daily as needed for constipation or heartburn        METOPROLOL TARTRATE PO Take 12.5 mg by mouth daily       Misc Natural Products (GLUCOSAMINE CHOND COMPLEX/MSM PO) Take 1 tablet by mouth daily       morphine sulfate HIGH CONCENTRATE (ROXANOL) 20 mg/mL (HIGH CONC) solution 2.5mg SL q8h  And every hour prn pain.       senna-docusate (SENOKOT-S/PERICOLACE) 8.6-50 MG tablet Take 2 tablets by mouth daily as needed for constipation       Patient Active Problem List   Diagnosis     History of hepatitis A  in 1980     History of hepatitis B in 1980     Cyst on ear, left, 2/25/2013     H/O squamous cell carcinoma of skin, right hand, 11/28/2011     Ganglion cyst, 1st MTP left 3/25/2009     Seizure disorder (H)     H/O renal calculi, 1987     PVC's (premature ventricular contractions)     H/O small bowel obstruction, lysis of adhesions, 1970     Osteoporosis, Bone Density 2013: Femeral neck: -2.6 (L) and -2.3 (R)     Cardiomegaly     Diverticulosis of large intestine without hemorrhage     Bilateral dry eyes     Heart murmur     Ataxia     Chronic fatigue     Benign essential hypertension     Primary osteoarthritis involving multiple joints     PVD (peripheral vascular disease) (H)     Abnormal mammogram (3./12/2015: 4mm grouping of calcificaions in the posterior central right breast)     Advance Care Planning     Low weight     External hemorrhoids     Chronic atrial fibrillation (H)     Dermatitis due to unknown cause, scalp     Slow transit constipation     Dementia with behavioral disturbance, unspecified dementia type     Frozen joint, 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 finger     Protein-calorie malnutrition (H)     CKD (chronic kidney disease) stage 3, GFR 30-59  "ml/min (H)     REVIEW OF SYSTEMS:  Unobtainable secondary to cognitive impairment.     Objective:  Pulse 96   Temp 97.8  F (36.6  C)   Resp 16   Ht 1.549 m (5' 1\")   BMI 19.84 kg/m    Exam:  GENERAL APPEARANCE: Sleepy, ill appearing female seen.  Pale but calm.  Arouseable.   HEAD: Normocephalic. No facial asymmetry.     ENT:  Dry lips.  EYES: conjunctiva and lids normal, Wearing eyeglasses.  RESP: Quiet, effortless respirations. No cough. CTA bilaterally.  CV: Irregular rhythm with slightly rapid rate. Grade 2/6 cardiac murmur. No LE edema. DP pulses +1 bilaterally..  ABDOMEN: Soft rounded abdomen. BS's positive all 4 quadrants. No tenderness with palpation. No guarding.   NEURO: Oriented to person only.  QUestioned my exam, but remained calm.  SKIN:  Numerous bruises on lower legs with small skin tear on left shin. .  PSYCH: Denies any pain or worries.  Eyes remained closed during most of exam. .      Labs:   CBC RESULTS:   Recent Labs   Lab Test 06/17/19 05/20/19 11/06/17 02/13/15   WBC 7.9  --  7.4 7.2   RBC 3.43*  --  3.59* 3.76*   HGB 11.5* 12.3 11.9 12.5   HCT 35.2  --  37.3 38.5   .6*  --  103.9* 102*   MCH 33.5*  --   --  33.2*   MCHC 32.7  --   --  32.6   RDW 13.6  --  13.2 13.8     --  342 223       Last Basic Metabolic Panel:  Recent Labs   Lab Test 09/03/19 06/17/19    139   POTASSIUM 3.2* 4.0   CHLORIDE 103 102   BRIJESH 9.3 9.0   CO2 26 28   BUN 29* 16   CR 1.00 0.69    89       Liver Function Studies -   Recent Labs   Lab Test 09/03/19 05/10/18   PROTTOTAL 6.3* 6.2*   ALBUMIN 2.9* 3.5   BILITOTAL 0.8 1.1   ALKPHOS 94 96   AST 13 15   ALT <10 <10     Family Communication; Met with son Manolo and discussed current status.  Likely she will continue to decline, but will monitor closely.  His other brothers are coming into the area this weekend and next week.  He plans to return to [place] today, but expressed sincere gratitude for all that is being done for his " mother.    ASSESSMENT/PLAN:  (R62.7) Failure to thrive in adult  (primary encounter diagnosis)  Comment: Ongoing  Plan: Continue hospice and comfort cares.    (E44.0) Moderate protein-calorie malnutrition (H)  Comment: Ongoing  Plan: Continue to offer NDS if she will accept, but focus on comfort cares.    (F03.91) Dementia with behavioral disturbance, unspecified dementia type  Comment: Advanced  Plan: Continue 24 hour skilled nursing care.    (Z51.5) Hospice care patient  Comment: Ongoing  Plan: COntinue hospice care.    Electronically signed by:  RAFAEL Flowers CNP *

## 2019-09-06 NOTE — LETTER
9/6/2019        RE: Yeni Pérez  [facility]  Yeni Pérez  [facility] [place] [address]        [place] [facility]  [place] Medical Record Number:  [id number]  Place of Service where encounter took place:  [facility] ([facility]) [[id number]]  Chief Complaint   Patient presents with     RECHECK     Home Care/Hospice     HPI:    Yeni Pérez  is a 89 year old (2/9/1927), who is being seen today for an episodic care visit.  HPI information obtained from: facility chart records, facility staff and [facility] chart review  Pr unable to participate due to cognitive & physical decline. Today's concern is:  Failure to thrive in adult  Much more comfortable today with small dose of morphine every 8 hours.  Does recognize her son.  Taking only sips of fluid.  Denies pain today.    Moderate protein-calorie malnutrition (H)  Appears to be losing weight, but now on hospice and no weight since last week.    Dementia with behavioral disturbance, unspecified dementia type  Has resided on 24 hour skilled nursing unit due to her advancing dementia.  Now bedbound due to current decline. Has skin tear on left lower leg and bruising, but no reports of other skin issues.    Hospice care patient  On [facility] care.     Past Medical and Surgical History reviewed in Epic today.    MEDICATIONS:  Current Outpatient Medications   Medication Sig Dispense Refill     acetaminophen (TYLENOL) 650 MG suppository Place 650 mg rectally every 6 hours as needed for fever       ACETAMINOPHEN PO Take 650 mg by mouth every 4 hours as needed for pain       atropine 1 % SOLN Place 2 drops under the tongue every 4 hours as needed for secretions       bisacodyl (DULCOLAX) 10 MG suppository Place 10 mg rectally daily as needed for constipation       bisacodyl (DULCOLAX) 10 MG Suppository Place 1 suppository (10 mg) rectally every 3 days prn 30 suppository      emollient (VANICREAM) cream Apply topically 2 times daily        levETIRAcetam (KEPPRA) 500 MG tablet Take 500 mg by mouth daily       loperamide (IMODIUM A-D) 2 MG tablet Take 1 tablet (2 mg) by mouth 3 times daily as needed for diarrhea       LORazepam (ATIVAN) 2 MG/ML (HIGH CONC) solution Take 0.25 mg by mouth every 4 hours as needed for anxiety       magnesium hydroxide (MILK OF MAGNESIA) 400 MG/5ML suspension Take 30 mLs by mouth daily as needed for constipation or heartburn        METOPROLOL TARTRATE PO Take 12.5 mg by mouth daily       Misc Natural Products (GLUCOSAMINE CHOND COMPLEX/MSM PO) Take 1 tablet by mouth daily       morphine sulfate HIGH CONCENTRATE (ROXANOL) 20 mg/mL (HIGH CONC) solution 2.5mg SL q8h  And every hour prn pain.       senna-docusate (SENOKOT-S/PERICOLACE) 8.6-50 MG tablet Take 2 tablets by mouth daily as needed for constipation       Patient Active Problem List   Diagnosis     History of hepatitis A  in 1980     History of hepatitis B in 1980     Cyst on ear, left, 2/25/2013     H/O squamous cell carcinoma of skin, right hand, 11/28/2011     Ganglion cyst, 1st MTP left 3/25/2009     Seizure disorder (H)     H/O renal calculi, 1987     PVC's (premature ventricular contractions)     H/O small bowel obstruction, lysis of adhesions, 1970     Osteoporosis, Bone Density 2013: Femeral neck: -2.6 (L) and -2.3 (R)     Cardiomegaly     Diverticulosis of large intestine without hemorrhage     Bilateral dry eyes     Heart murmur     Ataxia     Chronic fatigue     Benign essential hypertension     Primary osteoarthritis involving multiple joints     PVD (peripheral vascular disease) (H)     Abnormal mammogram (3./12/2015: 4mm grouping of calcificaions in the posterior central right breast)     Advance Care Planning     Low weight     External hemorrhoids     Chronic atrial fibrillation (H)     Dermatitis due to unknown cause, scalp     Slow transit constipation     Dementia with behavioral disturbance, unspecified dementia type     Frozen joint, 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  "finger     Protein-calorie malnutrition (H)     CKD (chronic kidney disease) stage 3, GFR 30-59 ml/min (H)     REVIEW OF SYSTEMS:  Unobtainable secondary to cognitive impairment.     Objective:  Pulse 96   Temp 97.8  F (36.6  C)   Resp 16   Ht 1.549 m (5' 1\")   BMI 19.84 kg/m     Exam:  GENERAL APPEARANCE: Sleepy, ill appearing female seen.  Pale but calm.  Arouseable.   HEAD: Normocephalic. No facial asymmetry.     ENT:  Dry lips.  EYES: conjunctiva and lids normal, Wearing eyeglasses.  RESP: Quiet, effortless respirations. No cough. CTA bilaterally.  CV: Irregular rhythm with  slightly rapid rate. Grade 2/6 cardiac murmur. No LE edema. DP pulses +1 bilaterally..  ABDOMEN: Soft rounded abdomen. BS's positive all 4 quadrants. No tenderness with palpation. No guarding.   NEURO: Oriented to person only.  QUestioned my exam, but remained calm.  SKIN:  Numerous bruises on lower legs with small skin tear on left shin. .  PSYCH: Denies any pain or worries.  Eyes remained closed during most of exam. .      Labs:   CBC RESULTS:   Recent Labs   Lab Test 06/17/19 05/20/19 11/06/17 02/13/15   WBC 7.9  --  7.4 7.2   RBC 3.43*  --  3.59* 3.76*   HGB 11.5* 12.3 11.9 12.5   HCT 35.2  --  37.3 38.5   .6*  --  103.9* 102*   MCH 33.5*  --   --  33.2*   MCHC 32.7  --   --  32.6   RDW 13.6  --  13.2 13.8     --  342 223       Last Basic Metabolic Panel:  Recent Labs   Lab Test 09/03/19 06/17/19    139   POTASSIUM 3.2* 4.0   CHLORIDE 103 102   BRIJESH 9.3 9.0   CO2 26 28   BUN 29* 16   CR 1.00 0.69    89       Liver Function Studies -   Recent Labs   Lab Test 09/03/19 05/10/18   PROTTOTAL 6.3* 6.2*   ALBUMIN 2.9* 3.5   BILITOTAL 0.8 1.1   ALKPHOS 94 96   AST 13 15   ALT <10 <10     Family Communication; Met with son Mnaolo and discussed current status.  Likely she will continue to decline, but will monitor closely.  His other brothers are coming into the area this weekend and next week.  He plans to return to " [place] today, but expressed sincere gratitude for all that is being done for his mother.    ASSESSMENT/PLAN:  (R62.7) Failure to thrive in adult  (primary encounter diagnosis)  Comment: Ongoing  Plan: Continue hospice and comfort cares.    (E44.0) Moderate protein-calorie malnutrition (H)  Comment: Ongoing  Plan: Continue to offer NDS if she will accept, but focus on comfort cares.    (F03.91) Dementia with behavioral disturbance, unspecified dementia type  Comment: Advanced  Plan: Continue 24 hour skilled nursing care.    (Z51.5) Hospice care patient  Comment: Ongoing  Plan: COntinue hospice care.    Electronically signed by:  RAFAEL Flowers CNP *            Sincerely,        RAFAEL Flowers CNP

## 2019-09-09 NOTE — PROGRESS NOTES
[facility]  [place] Medical Record Number:  [id number]  Place of Service where encounter took place:  [facility] [[id number]]  Chief Complaint   Patient presents with     RECHECK     Home Care/Hospice       HPI:    Yeni Pérez  is a 89 year old (2/9/1927), who is being seen today for an episodic care visit.  HPI information obtained from: facility chart records, facility staff and [facility] chart review  Pt is unreliable to participate in ROS due to cognitive decline. Today's concern is:  Failure to thrive in adult  Eating bites and taking sips of fluid.  Hospice in place.  No emesis in past two days.  Morphine started and dose increased on 9/7 due to crying out with pain with moved, although at rest, pt denied pain.  Today again is denying pain, but appears more comfortable.     Moderate protein-calorie malnutrition (H)  Eating very little and weight is low.     Dementia with behavioral disturbance, unspecified dementia type  Advancing dementia and resides in secure memory care unit.  Now declining and on hospice care.  Nursing notes that they tried to get her up yesterday, but she was up in the BRODA chair only minutes when she requested to lay back down as she did not feel well.    Hospice care patient  Remains on [facility] and nurse to see her today.  Pt's son Roger in [facility].  Manolo returned to [place].    Past Medical and Surgical History reviewed in Epic today.    MEDICATIONS:  Current Outpatient Medications   Medication Sig Dispense Refill     morphine sulfate HIGH CONCENTRATE (ROXANOL) 20 mg/mL (HIGH CONC) solution 2.5mg SL q4h  And every hour prn pain.       acetaminophen (TYLENOL) 650 MG suppository Place 650 mg rectally every 6 hours as needed for fever       ACETAMINOPHEN PO Take 650 mg by mouth every 4 hours as needed for pain       atropine 1 % SOLN Place 2 drops under the tongue every 4 hours as needed for secretions       bisacodyl (DULCOLAX) 10 MG  suppository Place 10 mg rectally daily as needed for constipation       bisacodyl (DULCOLAX) 10 MG Suppository Place 1 suppository (10 mg) rectally every 3 days prn 30 suppository      emollient (VANICREAM) cream Apply topically 2 times daily       levETIRAcetam (KEPPRA) 500 MG tablet Take 500 mg by mouth daily       loperamide (IMODIUM A-D) 2 MG tablet Take 1 tablet (2 mg) by mouth 3 times daily as needed for diarrhea       LORazepam (ATIVAN) 2 MG/ML (HIGH CONC) solution Take 0.25 mg by mouth every 4 hours as needed for anxiety       magnesium hydroxide (MILK OF MAGNESIA) 400 MG/5ML suspension Take 30 mLs by mouth daily as needed for constipation or heartburn        METOPROLOL TARTRATE PO Take 12.5 mg by mouth daily       Misc Natural Products (GLUCOSAMINE CHOND COMPLEX/MSM PO) Take 1 tablet by mouth daily       senna-docusate (SENOKOT-S/PERICOLACE) 8.6-50 MG tablet Take 2 tablets by mouth daily as needed for constipation       Patient Active Problem List   Diagnosis     History of hepatitis A  in 1980     History of hepatitis B in 1980     Cyst on ear, left, 2/25/2013     H/O squamous cell carcinoma of skin, right hand, 11/28/2011     Ganglion cyst, 1st MTP left 3/25/2009     Seizure disorder (H)     H/O renal calculi, 1987     PVC's (premature ventricular contractions)     H/O small bowel obstruction, lysis of adhesions, 1970     Osteoporosis, Bone Density 2013: Femeral neck: -2.6 (L) and -2.3 (R)     Cardiomegaly     Diverticulosis of large intestine without hemorrhage     Bilateral dry eyes     Heart murmur     Ataxia     Chronic fatigue     Benign essential hypertension     Primary osteoarthritis involving multiple joints     PVD (peripheral vascular disease) (H)     Abnormal mammogram (3./12/2015: 4mm grouping of calcificaions in the posterior central right breast)     Advance Care Planning     Low weight     External hemorrhoids     Chronic atrial fibrillation (H)     Dermatitis due to unknown cause, scalp  "    Slow transit constipation     Dementia with behavioral disturbance, unspecified dementia type     Frozen joint, 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 finger     Protein-calorie malnutrition (H)     CKD (chronic kidney disease) stage 3, GFR 30-59 ml/min (H)     REVIEW OF SYSTEMS:  Unobtainable secondary to cognitive impairment.     Objective:  /74   Pulse 72   Temp 97.5  F (36.4  C)   Resp 18   Ht 1.549 m (5' 1\")   Wt 47.6 kg (105 lb)   BMI 19.84 kg/m    Exam:  GENERAL APPEARANCE: Alert female seen.  Resting in bed and appears more awake than 3 days ago.    HEAD: Normocephalic. No facial asymmetry.     ENT:  Dry lips.  EYES: conjunctiva and lids normal, Wearing eyeglasses.  RESP: Quiet, effortless respirations. No cough. CTA bilaterally.  CV: Irregular rhythm with slightly rapid rate of 92. Grade 2/6 cardiac murmur. No LE edema. DP pulses +1 bilaterally..  ABDOMEN: Soft rounded abdomen. BS's positive all 4 quadrants. No tenderness with palpation. No guarding.   NEURO: Oriented to person only.  QUestioned my exam, but remained calm.  SKIN:  Numerous bruises on lower legs with small skin tear on left shin. .  PSYCH: Denies any pain or worries. Smiling at times. .      Labs:   CBC RESULTS:   Recent Labs   Lab Test 06/17/19 05/20/19  11/06/17  02/13/15   WBC 7.9  --   --  7.4   < > 7.2   RBC 3.43*  --   --  3.59*   < > 3.76*   HGB 11.5* 12.3   < > 11.9   < > 12.5   HCT 35.2  --   --  37.3   < > 38.5   .6*  --   --  103.9*   < > 102*   MCH 33.5*  --   --   --   --  33.2*   MCHC 32.7  --   --   --   --  32.6   RDW 13.6  --   --  13.2   < > 13.8     --   --  342   < > 223    < > = values in this interval not displayed.       Last Basic Metabolic Panel:  Recent Labs   Lab Test 09/03/19 06/17/19    139   POTASSIUM 3.2* 4.0   CHLORIDE 103 102   BRIJESH 9.3 9.0   CO2 26 28   BUN 29* 16   CR 1.00 0.69    89     GFR Estimate   Date Value Ref Range Status   09/03/2019 49 (L) >60 ml/min/1.73m2 Final   06/17/2019 >60 " >60 ml/min/1.73m2 Final   05/20/2019 >60 >60 ml/min/1.73m2 Final     Liver Function Studies -   Recent Labs   Lab Test 09/03/19 05/10/18   PROTTOTAL 6.3* 6.2*   ALBUMIN 2.9* 3.5   BILITOTAL 0.8 1.1   ALKPHOS 94 96   AST 13 15   ALT <10 <10     Family Communication:  Discussed status with son and hospice nurse,  Jenna.    ASSESSMENT/PLAN:  (R62.7) Failure to thrive in adult  (primary encounter diagnosis)  Comment: Ongoing, but appears more comfortable  Plan Continue current POC.    (E44.0) Moderate protein-calorie malnutrition (H)  Comment: Ongoing  Plan: Continue hospice and comfort cares.    (F03.91) Dementia with behavioral disturbance, unspecified dementia type  Comment: Chronic  Plan: Continue 24 hours skilled nursing care and hospice services    (Z51.5) Hospice care patient  Comment: Ongoing  Plan: Continue hospice care, as comfort is goal of tx.     Electronically signed by:  RAFAEL Flowers CNP

## 2019-09-09 NOTE — LETTER
9/9/2019        RE: Yeni Pérez  C/o [facility]  Yeni Pérez  [facility] [address]        [facility]  [place] Record Number:  [id number]  Place of Service where encounter took place:  [facility] [[id number]]  Chief Complaint   Patient presents with     RECHECK     Home Care/Hospice       HPI:    Yeni Pérez  is a 89 year old (2/9/1927), who is being seen today for an episodic care visit.  HPI information obtained from: facility chart records, facility staff and [facility] chart review  Pt is unreliable to participate in ROS due to cognitive decline. Today's concern is:  Failure to thrive in adult  Eating bites and taking sips of fluid.  Hospice in place.  No emesis in past two days.  Morphine started and dose increased on 9/7 due to crying out with pain with moved, although at rest, pt denied pain.  Today again is denying pain, but appears more comfortable.     Moderate protein-calorie malnutrition (H)  Eating very little and weight is low.     Dementia with behavioral disturbance, unspecified dementia type  Advancing dementia and resides in secure memory care unit.  Now declining and on hospice care.  Nursing notes that they tried to get her up yesterday, but she was up in the BRODA chair only minutes when she requested to lay back down as she did not feel well.    Hospice care patient  Remains on [facility] and nurse to see her today.  Pt's son Roger in [facility].  Manolo returned to [place].    Past Medical and Surgical History reviewed in Epic today.    MEDICATIONS:  Current Outpatient Medications   Medication Sig Dispense Refill     morphine sulfate HIGH CONCENTRATE (ROXANOL) 20 mg/mL (HIGH CONC) solution 2.5mg SL q4h  And every hour prn pain.       acetaminophen (TYLENOL) 650 MG suppository Place 650 mg rectally every 6 hours as needed for fever       ACETAMINOPHEN PO Take 650 mg by mouth every 4 hours as needed for pain       atropine 1 % SOLN  Place 2 drops under the tongue every 4 hours as needed for secretions       bisacodyl (DULCOLAX) 10 MG suppository Place 10 mg rectally daily as needed for constipation       bisacodyl (DULCOLAX) 10 MG Suppository Place 1 suppository (10 mg) rectally every 3 days prn 30 suppository      emollient (VANICREAM) cream Apply topically 2 times daily       levETIRAcetam (KEPPRA) 500 MG tablet Take 500 mg by mouth daily       loperamide (IMODIUM A-D) 2 MG tablet Take 1 tablet (2 mg) by mouth 3 times daily as needed for diarrhea       LORazepam (ATIVAN) 2 MG/ML (HIGH CONC) solution Take 0.25 mg by mouth every 4 hours as needed for anxiety       magnesium hydroxide (MILK OF MAGNESIA) 400 MG/5ML suspension Take 30 mLs by mouth daily as needed for constipation or heartburn        METOPROLOL TARTRATE PO Take 12.5 mg by mouth daily       Misc Natural Products (GLUCOSAMINE CHOND COMPLEX/MSM PO) Take 1 tablet by mouth daily       senna-docusate (SENOKOT-S/PERICOLACE) 8.6-50 MG tablet Take 2 tablets by mouth daily as needed for constipation       Patient Active Problem List   Diagnosis     History of hepatitis A  in 1980     History of hepatitis B in 1980     Cyst on ear, left, 2/25/2013     H/O squamous cell carcinoma of skin, right hand, 11/28/2011     Ganglion cyst, 1st MTP left 3/25/2009     Seizure disorder (H)     H/O renal calculi, 1987     PVC's (premature ventricular contractions)     H/O small bowel obstruction, lysis of adhesions, 1970     Osteoporosis, Bone Density 2013: Femeral neck: -2.6 (L) and -2.3 (R)     Cardiomegaly     Diverticulosis of large intestine without hemorrhage     Bilateral dry eyes     Heart murmur     Ataxia     Chronic fatigue     Benign essential hypertension     Primary osteoarthritis involving multiple joints     PVD (peripheral vascular disease) (H)     Abnormal mammogram (3./12/2015: 4mm grouping of calcificaions in the posterior central right breast)     Advance Care Planning     Low weight  "    External hemorrhoids     Chronic atrial fibrillation (H)     Dermatitis due to unknown cause, scalp     Slow transit constipation     Dementia with behavioral disturbance, unspecified dementia type     Frozen joint, 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 finger     Protein-calorie malnutrition (H)     CKD (chronic kidney disease) stage 3, GFR 30-59 ml/min (H)     REVIEW OF SYSTEMS:  Unobtainable secondary to cognitive impairment.     Objective:  /74   Pulse 72   Temp 97.5  F (36.4  C)   Resp 18   Ht 1.549 m (5' 1\")   Wt 47.6 kg (105 lb)   BMI 19.84 kg/m     Exam:  GENERAL APPEARANCE:  Alert female seen.  Resting in bed and appears more awake than 3 days ago.    HEAD: Normocephalic. No facial asymmetry.     ENT:  Dry lips.  EYES: conjunctiva and lids normal, Wearing eyeglasses.  RESP: Quiet, effortless respirations. No cough. CTA bilaterally.  CV: Irregular rhythm with slightly rapid rate of 92. Grade 2/6 cardiac murmur. No LE edema. DP pulses +1 bilaterally..  ABDOMEN: Soft rounded abdomen. BS's positive all 4 quadrants. No tenderness with palpation. No guarding.   NEURO: Oriented to person only.  QUestioned my exam, but remained calm.  SKIN:  Numerous bruises on lower legs with small skin tear on left shin. .  PSYCH: Denies any pain or worries. Smiling at times. .      Labs:   CBC RESULTS:   Recent Labs   Lab Test 06/17/19 05/20/19  11/06/17  02/13/15   WBC 7.9  --   --  7.4   < > 7.2   RBC 3.43*  --   --  3.59*   < > 3.76*   HGB 11.5* 12.3   < > 11.9   < > 12.5   HCT 35.2  --   --  37.3   < > 38.5   .6*  --   --  103.9*   < > 102*   MCH 33.5*  --   --   --   --  33.2*   MCHC 32.7  --   --   --   --  32.6   RDW 13.6  --   --  13.2   < > 13.8     --   --  342   < > 223    < > = values in this interval not displayed.       Last Basic Metabolic Panel:  Recent Labs   Lab Test 09/03/19 06/17/19    139   POTASSIUM 3.2* 4.0   CHLORIDE 103 102   BRIJESH 9.3 9.0   CO2 26 28   BUN 29* 16   CR 1.00 0.69    89 "     GFR Estimate   Date Value Ref Range Status   09/03/2019 49 (L) >60 ml/min/1.73m2 Final   06/17/2019 >60 >60 ml/min/1.73m2 Final   05/20/2019 >60 >60 ml/min/1.73m2 Final     Liver Function Studies -   Recent Labs   Lab Test 09/03/19 05/10/18   PROTTOTAL 6.3* 6.2*   ALBUMIN 2.9* 3.5   BILITOTAL 0.8 1.1   ALKPHOS 94 96   AST 13 15   ALT <10 <10     Family Communication:  Discussed status with son and hospice nurse,  Jenna.    ASSESSMENT/PLAN:  (R62.7) Failure to thrive in adult  (primary encounter diagnosis)  Comment: Ongoing, but appears more comfortable  Plan Continue current POC.    (E44.0) Moderate protein-calorie malnutrition (H)  Comment: Ongoing  Plan: Continue hospice and comfort cares.    (F03.91) Dementia with behavioral disturbance, unspecified dementia type  Comment: Chronic  Plan: Continue 24 hours skilled nursing care and hospice services    (Z51.5) Hospice care patient  Comment: Ongoing  Plan: Continue hospice care, as comfort is goal of tx.     Electronically signed by:  RAFAEL Flowers CNP             Sincerely,        RAFAEL Flowers CNP

## 2019-09-12 NOTE — LETTER
9/12/2019        RE: Yeni Pérez  C/[facility]  Yeni Pérez  [facility] [address]        [facility]  [place] Medical Record Number:  [id number]  Place of Service where encounter took place:  [facility] ([facility] [[id number]]  Chief Complaint   Patient presents with     RECHECK     Home Care/Hospice       HPI:    Yeni Pérez  is a 89 year old (2/9/1927), who is being seen today for an episodic care visit.  HPI information obtained from: facility chart records, facility staff, [facility] chart review and hospice report.  Pt is unable to participate in ROS due to cognitive loss. Today's concern is:  Failure to thrive in adult  Continues to eat and drink very little, although since the lorazepam was started, she is calmer and has taken in a little bit more nutrition. Remains on morphine for pain.     Moderate protein-calorie malnutrition (H)  No weight in the past week, but appears to have lost weight due to her very limited intake in past two weeks    Dementia with behavioral disturbance, unspecified dementia type  Resides on memory care unit.  Requires full assist with all cares.  Now on hospice care as pt is declining    Constipation  No BM since 9/6.    Hospice care patient  On [facility].     Past Medical and Surgical History reviewed in Epic today.    MEDICATIONS:  Current Outpatient Medications   Medication Sig Dispense Refill     acetaminophen (TYLENOL) 650 MG suppository Place 650 mg rectally every 6 hours as needed for fever       ACETAMINOPHEN PO Take 650 mg by mouth every 4 hours as needed for pain       atropine 1 % SOLN Place 2 drops under the tongue every 4 hours as needed for secretions       bisacodyl (DULCOLAX) 10 MG suppository Place 10 mg rectally daily as needed for constipation       bisacodyl (DULCOLAX) 10 MG Suppository Place 1 suppository (10 mg) rectally every 3 days prn 30 suppository      emollient (VANICREAM) cream Apply topically 2 times  daily       levETIRAcetam (KEPPRA) 500 MG tablet Take 500 mg by mouth daily       loperamide (IMODIUM A-D) 2 MG tablet Take 1 tablet (2 mg) by mouth 3 times daily as needed for diarrhea       LORazepam (ATIVAN) 2 MG/ML (HIGH CONC) solution Take 0.25 mg by mouth 2 times daily        magnesium hydroxide (MILK OF MAGNESIA) 400 MG/5ML suspension Take 30 mLs by mouth daily as needed for constipation or heartburn        METOPROLOL TARTRATE PO Take 12.5 mg by mouth daily       morphine sulfate HIGH CONCENTRATE (ROXANOL) 20 mg/mL (HIGH CONC) solution 2.5mg SL q4h  And every hour prn pain.       senna-docusate (SENOKOT-S/PERICOLACE) 8.6-50 MG tablet Take 2 tablets by mouth daily as needed for constipation       Patient Active Problem List   Diagnosis     History of hepatitis A  in 1980     History of hepatitis B in 1980     Cyst on ear, left, 2/25/2013     H/O squamous cell carcinoma of skin, right hand, 11/28/2011     Ganglion cyst, 1st MTP left 3/25/2009     Seizure disorder (H)     H/O renal calculi, 1987     PVC's (premature ventricular contractions)     H/O small bowel obstruction, lysis of adhesions, 1970     Osteoporosis, Bone Density 2013: Femeral neck: -2.6 (L) and -2.3 (R)     Cardiomegaly     Diverticulosis of large intestine without hemorrhage     Bilateral dry eyes     Heart murmur     Ataxia     Chronic fatigue     Benign essential hypertension     Primary osteoarthritis involving multiple joints     PVD (peripheral vascular disease) (H)     Abnormal mammogram (3./12/2015: 4mm grouping of calcificaions in the posterior central right breast)     Advance Care Planning     Low weight     External hemorrhoids     Chronic atrial fibrillation (H)     Dermatitis due to unknown cause, scalp     Slow transit constipation     Dementia with behavioral disturbance, unspecified dementia type     Frozen joint, 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 finger     Protein-calorie malnutrition (H)     CKD (chronic kidney disease) stage 3, GFR 30-59  "ml/min (H)         REVIEW OF SYSTEMS:  Unobtainable secondary to cognitive impairment, but pt denies pain.     Objective:  /82   Pulse 78   Temp 97.5  F (36.4  C)   Resp 20   Ht 1.549 m (5' 1\")   BMI 19.84 kg/m     Exam:  GENERAL APPEARANCE: Alert female seen.  Resting in bed and appears comfortable.  HEAD: Normocephalic. No facial asymmetry.     ENT:  Dry lips.  EYES: conjunctiva and lids normal, Wearing eyeglasses.  RESP: Quiet, effortless respirations. No cough. CTA bilaterally.  CV: Irregular rhythm with slightly rapid rate of 98. Grade 2/6 cardiac murmur. No LE edema. DP pulses +1 bilaterally..  ABDOMEN: Soft rounded abdomen. BS's positive all 4 quadrants but sluggish. No tenderness with palpation. No guarding.   NEURO: Oriented to person only.  QUestioned my exam, but remained calm.  SKIN:  Numerous bruises on lower legs with small skin tear on left shin. .  PSYCH: Denies any pain or worries. Smiling at times. .      Labs:   No recent labs as pt is on hospice care.    ASSESSMENT/PLAN:  (R62.7) Failure to thrive in adult  (primary encounter diagnosis)  Comment: Ongoing  Plan: Continue hospice and comfort meds.    (E44.0) Moderate protein-calorie malnutrition (H)  Comment: Ongoing  Plan: Continue hospice and nutrition as she will accept.    (F03.91) Dementia with behavioral disturbance, unspecified dementia type  Comment: Advanced  Plan: Continue 24 hour skilled nursing care and hospice care.    (Z51.5) Hospice care patient  Comment: Ongoing  Plan: Continue hospice care.    (K59.01) Slow transit constipation  Comment: Chronic  Plan: Dulcolax supp now.  If no results, nsg to notify hospice.  Start dulcolax supp every other day.    Electronically signed by:  RAFAEL Flowers CNP            Sincerely,        RAFAEL Flowers CNP    "

## 2019-09-12 NOTE — PROGRESS NOTES
[place] [facility]  [place] Medical Record Number:  [id number]  Place of Service where encounter took place:  [facility] ([facility]) [[id number]]  Chief Complaint   Patient presents with     RECHECK     Home Care/Hospice       HPI:    Yeni Pérez  is a 89 year old (2/9/1927), who is being seen today for an episodic care visit.  HPI information obtained from: facility chart records, facility staff, [facility] chart review and hospice report.  Pt is unable to participate in ROS due to cognitive loss. Today's concern is:  Failure to thrive in adult  Continues to eat and drink very little, although since the lorazepam was started, she is calmer and has taken in a little bit more nutrition. Remains on morphine for pain.     Moderate protein-calorie malnutrition (H)  No weight in the past week, but appears to have lost weight due to her very limited intake in past two weeks    Dementia with behavioral disturbance, unspecified dementia type  Resides on memory care unit.  Requires full assist with all cares.  Now on hospice care as pt is declining    Constipation  No BM since 9/6.    Hospice care patient  On [facility].     Past Medical and Surgical History reviewed in Epic today.    MEDICATIONS:  Current Outpatient Medications   Medication Sig Dispense Refill     acetaminophen (TYLENOL) 650 MG suppository Place 650 mg rectally every 6 hours as needed for fever       ACETAMINOPHEN PO Take 650 mg by mouth every 4 hours as needed for pain       atropine 1 % SOLN Place 2 drops under the tongue every 4 hours as needed for secretions       bisacodyl (DULCOLAX) 10 MG suppository Place 10 mg rectally daily as needed for constipation       bisacodyl (DULCOLAX) 10 MG Suppository Place 1 suppository (10 mg) rectally every 3 days prn 30 suppository      emollient (VANICREAM) cream Apply topically 2 times daily       levETIRAcetam (KEPPRA) 500 MG tablet Take 500 mg by mouth daily       loperamide (IMODIUM  A-D) 2 MG tablet Take 1 tablet (2 mg) by mouth 3 times daily as needed for diarrhea       LORazepam (ATIVAN) 2 MG/ML (HIGH CONC) solution Take 0.25 mg by mouth 2 times daily        magnesium hydroxide (MILK OF MAGNESIA) 400 MG/5ML suspension Take 30 mLs by mouth daily as needed for constipation or heartburn        METOPROLOL TARTRATE PO Take 12.5 mg by mouth daily       morphine sulfate HIGH CONCENTRATE (ROXANOL) 20 mg/mL (HIGH CONC) solution 2.5mg SL q4h  And every hour prn pain.       senna-docusate (SENOKOT-S/PERICOLACE) 8.6-50 MG tablet Take 2 tablets by mouth daily as needed for constipation       Patient Active Problem List   Diagnosis     History of hepatitis A  in 1980     History of hepatitis B in 1980     Cyst on ear, left, 2/25/2013     H/O squamous cell carcinoma of skin, right hand, 11/28/2011     Ganglion cyst, 1st MTP left 3/25/2009     Seizure disorder (H)     H/O renal calculi, 1987     PVC's (premature ventricular contractions)     H/O small bowel obstruction, lysis of adhesions, 1970     Osteoporosis, Bone Density 2013: Femeral neck: -2.6 (L) and -2.3 (R)     Cardiomegaly     Diverticulosis of large intestine without hemorrhage     Bilateral dry eyes     Heart murmur     Ataxia     Chronic fatigue     Benign essential hypertension     Primary osteoarthritis involving multiple joints     PVD (peripheral vascular disease) (H)     Abnormal mammogram (3./12/2015: 4mm grouping of calcificaions in the posterior central right breast)     Advance Care Planning     Low weight     External hemorrhoids     Chronic atrial fibrillation (H)     Dermatitis due to unknown cause, scalp     Slow transit constipation     Dementia with behavioral disturbance, unspecified dementia type     Frozen joint, 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 finger     Protein-calorie malnutrition (H)     CKD (chronic kidney disease) stage 3, GFR 30-59 ml/min (H)         REVIEW OF SYSTEMS:  Unobtainable secondary to cognitive impairment, but pt denies pain.  "    Objective:  /82   Pulse 78   Temp 97.5  F (36.4  C)   Resp 20   Ht 1.549 m (5' 1\")   BMI 19.84 kg/m    Exam:  GENERAL APPEARANCE: Alert female seen.  Resting in bed and appears comfortable.  HEAD: Normocephalic. No facial asymmetry.     ENT:  Dry lips.  EYES: conjunctiva and lids normal, Wearing eyeglasses.  RESP: Quiet, effortless respirations. No cough. CTA bilaterally.  CV: Irregular rhythm with slightly rapid rate of 98. Grade 2/6 cardiac murmur. No LE edema. DP pulses +1 bilaterally..  ABDOMEN: Soft rounded abdomen. BS's positive all 4 quadrants but sluggish. No tenderness with palpation. No guarding.   NEURO: Oriented to person only.  QUestioned my exam, but remained calm.  SKIN:  Numerous bruises on lower legs with small skin tear on left shin. .  PSYCH: Denies any pain or worries. Smiling at times. .      Labs:   No recent labs as pt is on hospice care.    ASSESSMENT/PLAN:  (R62.7) Failure to thrive in adult  (primary encounter diagnosis)  Comment: Ongoing  Plan: Continue hospice and comfort meds.    (E44.0) Moderate protein-calorie malnutrition (H)  Comment: Ongoing  Plan: Continue hospice and nutrition as she will accept.    (F03.91) Dementia with behavioral disturbance, unspecified dementia type  Comment: Advanced  Plan: Continue 24 hour skilled nursing care and hospice care.    (Z51.5) Hospice care patient  Comment: Ongoing  Plan: Continue hospice care.    (K59.01) Slow transit constipation  Comment: Chronic  Plan: Dulcolax supp now.  If no results, nsg to notify hospice.  Start dulcolax supp every other day.    Electronically signed by:  RAFAEL Flowers CNP        "

## 2019-09-16 NOTE — PROGRESS NOTES
[place] [facility]  [place] Medical Record Number:  [id number]  Place of Service where encounter took place[facility] [[id number]]  Chief Complaint   Patient presents with     RECHECK     Home Care/Hospice       HPI:    Yeni Pérez  is a 89 year old (2/9/1927), who is being seen today for an episodic care visit.  HPI information obtained from: facility chart records, facility staff and [facility] chart review.  Pt is non responsive and unable to participate in ROS. Today's concern is:  Failure to thrive in adult  Ongoing decline and is now unresponsive.  Much more comfortable and resting peacefully.  No oral intake.  Family in attendance much of day yesterday.  Morphine dose increased yesterday and pt has not had any prn morphine since.    Moderate protein-calorie malnutrition (H)  No further oral intake, as pt is now unresponsive.  All meds are sublingual or rectal.    Dementia with behavioral disturbance, unspecified dementia type  End stage disease and resides on memory care unit.  Receiving hospice care.    Constipation  Last BM was 4 days ago.  Dulcolax supp not given two days ago.    Past Medical and Surgical History reviewed in Epic today.    MEDICATIONS:  Current Outpatient Medications   Medication Sig Dispense Refill     acetaminophen (TYLENOL) 650 MG suppository Place 650 mg rectally every 6 hours as needed for fever       ACETAMINOPHEN PO Take 650 mg by mouth every 4 hours as needed for pain       atropine 1 % SOLN Place 2 drops under the tongue every 4 hours as needed for secretions       bisacodyl (DULCOLAX) 10 MG suppository Place 1 suppository (10 mg) rectally every other day       bisacodyl (DULCOLAX) 10 MG suppository Place 10 mg rectally every other day And daily as needed       emollient (VANICREAM) cream Apply topically 2 times daily       loperamide (IMODIUM A-D) 2 MG tablet Take 1 tablet (2 mg) by mouth 3 times daily as needed for diarrhea       LORazepam  "(ATIVAN) 2 MG/ML (HIGH CONC) solution Take 1 mg by mouth every 4 hours And every 2 hours as needed       magnesium hydroxide (MILK OF MAGNESIA) 400 MG/5ML suspension Take 30 mLs by mouth daily as needed for constipation or heartburn        morphine sulfate HIGH CONCENTRATE (ROXANOL) 20 mg/mL (HIGH CONC) solution Take 5 mg by mouth every 4 hours And every 1 hour as needed       senna-docusate (SENOKOT-S/PERICOLACE) 8.6-50 MG tablet Take 2 tablets by mouth daily as needed for constipation       Patient Active Problem List   Diagnosis     History of hepatitis A  in 1980     History of hepatitis B in 1980     Cyst on ear, left, 2/25/2013     H/O squamous cell carcinoma of skin, right hand, 11/28/2011     Ganglion cyst, 1st MTP left 3/25/2009     Seizure disorder (H)     H/O renal calculi, 1987     PVC's (premature ventricular contractions)     H/O small bowel obstruction, lysis of adhesions, 1970     Osteoporosis, Bone Density 2013: Femeral neck: -2.6 (L) and -2.3 (R)     Cardiomegaly     Diverticulosis of large intestine without hemorrhage     Bilateral dry eyes     Heart murmur     Ataxia     Chronic fatigue     Benign essential hypertension     Primary osteoarthritis involving multiple joints     PVD (peripheral vascular disease) (H)     Abnormal mammogram (3./12/2015: 4mm grouping of calcificaions in the posterior central right breast)     Advance Care Planning     Low weight     External hemorrhoids     Chronic atrial fibrillation (H)     Dermatitis due to unknown cause, scalp     Slow transit constipation     Dementia with behavioral disturbance, unspecified dementia type     Frozen joint, 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 finger     Protein-calorie malnutrition (H)     CKD (chronic kidney disease) stage 3, GFR 30-59 ml/min (H)       REVIEW OF SYSTEMS:  Unobtainable due to non responsive state.    Objective:  Pulse 122   Resp 16   Ht 1.549 m (5' 1\")   Wt 50.8 kg (112 lb)   BMI 21.16 kg/m    Exam:  GENERAL APPEARANCE: Un " responsive female, resting in bed.  Pale and mouth breathing. Resting quietly. .  HEAD: Normocephalic. No facial asymmetry.     ENT:  Dry lips.  EYES: Eyes closed.   RESP: Quiet, effortless respirations. No cough. Periods of apnea lasting 15 sec.   CV: Irregular rhythm with  rapid rate. Grade 2/6 cardiac murmur. No LE edema. No mottling and skin temp is warm.   ABDOMEN: Soft flat abdomen. BS's positive all 4 quadrants   NEURO: Eyes closed and no verbalization. .      Labs:   No labs as pt is on hospice care.    ASSESSMENT/PLAN:  (R62.7) Failure to thrive in adult  (primary encounter diagnosis)  Comment: Nearing end of life  Plan: Continue current dose of comfort meds.  Hospice to see today.    (E44.0) Moderate protein-calorie malnutrition (H)  Comment: No longer responsive to swallow  Plan: Continue hospice and comfort meds.    (F03.91) Dementia with behavioral disturbance, unspecified dementia type  Comment: Nearing end of life  Plan: Same as number one.    Constipation  Comment:  Ongoing  Plan:  nsg to give dulcolax supp this morning.    (Z51.5) Hospice care patient  Comment: Ongoing  Plan: Continue hospice care.    Electronically signed by:  RAFAEL Flowers CNP

## 2019-09-16 NOTE — LETTER
9/16/2019        RE: Yeni Pérez  [facility]  Yeni Pérez  [facility] [id number]        [place] [facility]  [place] Medical Record Number:  [id number]  Place of Service where encounter took place:  [facility] ([facility] [[id number]]  Chief Complaint   Patient presents with     Nursing Home Acute     Home Care/Hospice       HPI:    Yeni Pérez  is a 89 year old (2/9/1927), who is being seen today for an episodic care visit.  HPI information obtained from: facility chart records, facility staff and [facility] chart review.  Pt cannot participate in ROS due to dementia and minimally responsive state. Today's concern is:  Failure to thrive in adult  Ativan increased yesterday to 1 mg every 4 hours scheduled and morphine increased to 5 mg every 4 hours scheduled.  Taking no food, and minimal fluids.  Family present and states that she does appear more comfortable today, but continues to have some moaning.    Moderate protein-calorie malnutrition (H)  NO recent weight, but with loss of intake due to decline, anticipate significant weight loss    Dementia with behavioral disturbance, unspecified dementia type  Advanced disease and resides on memory care skilled nursing unit.  Now on hospice care due to decline.    Slow transit constipation   9/13.    Hospice care patient  On [facility].     Past Medical and Surgical History reviewed in Epic today.    MEDICATIONS:  Current Outpatient Medications   Medication Sig Dispense Refill     acetaminophen (TYLENOL) 650 MG suppository Place 650 mg rectally every 6 hours as needed for fever       ACETAMINOPHEN PO Take 650 mg by mouth every 4 hours as needed for pain       atropine 1 % SOLN Place 2 drops under the tongue every 4 hours as needed for secretions       bisacodyl (DULCOLAX) 10 MG suppository Place 1 suppository (10 mg) rectally every other day       bisacodyl (DULCOLAX) 10 MG suppository Place 10 mg rectally every other day And  daily as needed       emollient (VANICREAM) cream Apply topically 2 times daily       levETIRAcetam (KEPPRA) 500 MG tablet Take 500 mg by mouth daily       loperamide (IMODIUM A-D) 2 MG tablet Take 1 tablet (2 mg) by mouth 3 times daily as needed for diarrhea       LORazepam (ATIVAN) 2 MG/ML (HIGH CONC) solution Take 1 mg by mouth every 4 hours And every 2 hours as needed       magnesium hydroxide (MILK OF MAGNESIA) 400 MG/5ML suspension Take 30 mLs by mouth daily as needed for constipation or heartburn        METOPROLOL TARTRATE PO Take 12.5 mg by mouth daily       morphine sulfate HIGH CONCENTRATE (ROXANOL) 20 mg/mL (HIGH CONC) solution Take 5 mg by mouth every 4 hours And every 1 hour as needed       senna-docusate (SENOKOT-S/PERICOLACE) 8.6-50 MG tablet Take 2 tablets by mouth daily as needed for constipation       Patient Active Problem List   Diagnosis     History of hepatitis A  in 1980     History of hepatitis B in 1980     Cyst on ear, left, 2/25/2013     H/O squamous cell carcinoma of skin, right hand, 11/28/2011     Ganglion cyst, 1st MTP left 3/25/2009     Seizure disorder (H)     H/O renal calculi, 1987     PVC's (premature ventricular contractions)     H/O small bowel obstruction, lysis of adhesions, 1970     Osteoporosis, Bone Density 2013: Femeral neck: -2.6 (L) and -2.3 (R)     Cardiomegaly     Diverticulosis of large intestine without hemorrhage     Bilateral dry eyes     Heart murmur     Ataxia     Chronic fatigue     Benign essential hypertension     Primary osteoarthritis involving multiple joints     PVD (peripheral vascular disease) (H)     Abnormal mammogram (3./12/2015: 4mm grouping of calcificaions in the posterior central right breast)     Advance Care Planning     Low weight     External hemorrhoids     Chronic atrial fibrillation (H)     Dermatitis due to unknown cause, scalp     Slow transit constipation     Dementia with behavioral disturbance, unspecified dementia type     Frozen  "joint, 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 finger     Protein-calorie malnutrition (H)     CKD (chronic kidney disease) stage 3, GFR 30-59 ml/min (H)     REVIEW OF SYSTEMS:  Unobtainable secondary to cognitive impairment.     Objective:  Pulse 118   Resp 18   Ht 1.549 m (5' 1\")   BMI 19.84 kg/m     Exam:  GENERAL APPEARANCE:  Minimally responsive female, resting in bed with head of bed elevated.  Family in attendance. Moaning at times.  Morphine was last given two hours ago..  HEAD: Normocephalic. No facial asymmetry.     ENT:  Dry lips.  EYES: Eyes closed.   RESP: Quiet, effortless respirations. No cough. Periods of apnea lasting 15 sec. Occasional upper airway congestions.  CV: Irregular rhythm with  rapid rate. Grade 2/6 cardiac murmur. No LE edema. No mottling and skin temp is warm.   ABDOMEN: Soft rounded abdomen. BS's positive all 4 quadrants   NEURO: Eyes closed and no verbalization. .      Labs:   CBC RESULTS:   Recent Labs   Lab Test 06/17/19 05/20/19 11/06/17 02/13/15   WBC 7.9  --  7.4 7.2   RBC 3.43*  --  3.59* 3.76*   HGB 11.5* 12.3 11.9 12.5   HCT 35.2  --  37.3 38.5   .6*  --  103.9* 102*   MCH 33.5*  --   --  33.2*   MCHC 32.7  --   --  32.6   RDW 13.6  --  13.2 13.8     --  342 223       Last Basic Metabolic Panel:  Recent Labs   Lab Test 09/03/19 06/17/19    139   POTASSIUM 3.2* 4.0   CHLORIDE 103 102   BRIJESH 9.3 9.0   CO2 26 28   BUN 29* 16   CR 1.00 0.69    89     Family Communication:  Met with sons and daughter in law.  Discussed current status and expected further decline.  Comfort is the goal of all interventions.    ASSESSMENT/PLAN:  (R62.7) Failure to thrive in adult  (primary encounter diagnosis)  Comment: Ongoing and declining  Plan: Continue hospice care.  discontinue keppra and metoprolol as swallow is poor.      (E44.0) Moderate protein-calorie malnutrition (H)  Comment: Ongoing decline due to lack of intake  Plan: Continue current POC with hospice care.    (F03.91) Dementia " with behavioral disturbance, unspecified dementia type  Comment: End stage  Plan: Continue hospice care.    (K59.01) Slow transit constipation  Comment: Ongoing  Plan: Continue dulcolax suppositories for comfort.    (Z51.5) Hospice care patient  Comment: Ongoing  Plan: Continue hospice care.     Electronically signed by:  RAFAEL Flowers CNP             Sincerely,        RAFAEL Flowers CNP

## 2019-09-16 NOTE — PROGRESS NOTES
[place] [facility]  [place] Medical Record Number:  [id number]  Place of Service where encounter took place:  [facility] ([facility]) [[id number]]  Chief Complaint   Patient presents with     Nursing Home Acute     Home Care/Hospice       HPI:    Yeni Pérez  is a 89 year old (2/9/1927), who is being seen today for an episodic care visit.  HPI information obtained from: facility chart records, facility staff and [facility] chart review.  Pt cannot participate in ROS due to dementia and minimally responsive state. Today's concern is:  Failure to thrive in adult  Ativan increased yesterday to 1 mg every 4 hours scheduled and morphine increased to 5 mg every 4 hours scheduled.  Taking no food, and minimal fluids.  Family present and states that she does appear more comfortable today, but continues to have some moaning.    Moderate protein-calorie malnutrition (H)  NO recent weight, but with loss of intake due to decline, anticipate significant weight loss    Dementia with behavioral disturbance, unspecified dementia type  Advanced disease and resides on memory care skilled nursing unit.  Now on hospice care due to decline.    Slow transit constipation   9/13.    Hospice care patient  On [facility].     Past Medical and Surgical History reviewed in Epic today.    MEDICATIONS:  Current Outpatient Medications   Medication Sig Dispense Refill     acetaminophen (TYLENOL) 650 MG suppository Place 650 mg rectally every 6 hours as needed for fever       ACETAMINOPHEN PO Take 650 mg by mouth every 4 hours as needed for pain       atropine 1 % SOLN Place 2 drops under the tongue every 4 hours as needed for secretions       bisacodyl (DULCOLAX) 10 MG suppository Place 1 suppository (10 mg) rectally every other day       bisacodyl (DULCOLAX) 10 MG suppository Place 10 mg rectally every other day And daily as needed       emollient (VANICREAM) cream Apply topically 2 times daily       levETIRAcetam  (KEPPRA) 500 MG tablet Take 500 mg by mouth daily       loperamide (IMODIUM A-D) 2 MG tablet Take 1 tablet (2 mg) by mouth 3 times daily as needed for diarrhea       LORazepam (ATIVAN) 2 MG/ML (HIGH CONC) solution Take 1 mg by mouth every 4 hours And every 2 hours as needed       magnesium hydroxide (MILK OF MAGNESIA) 400 MG/5ML suspension Take 30 mLs by mouth daily as needed for constipation or heartburn        METOPROLOL TARTRATE PO Take 12.5 mg by mouth daily       morphine sulfate HIGH CONCENTRATE (ROXANOL) 20 mg/mL (HIGH CONC) solution Take 5 mg by mouth every 4 hours And every 1 hour as needed       senna-docusate (SENOKOT-S/PERICOLACE) 8.6-50 MG tablet Take 2 tablets by mouth daily as needed for constipation       Patient Active Problem List   Diagnosis     History of hepatitis A  in 1980     History of hepatitis B in 1980     Cyst on ear, left, 2/25/2013     H/O squamous cell carcinoma of skin, right hand, 11/28/2011     Ganglion cyst, 1st MTP left 3/25/2009     Seizure disorder (H)     H/O renal calculi, 1987     PVC's (premature ventricular contractions)     H/O small bowel obstruction, lysis of adhesions, 1970     Osteoporosis, Bone Density 2013: Femeral neck: -2.6 (L) and -2.3 (R)     Cardiomegaly     Diverticulosis of large intestine without hemorrhage     Bilateral dry eyes     Heart murmur     Ataxia     Chronic fatigue     Benign essential hypertension     Primary osteoarthritis involving multiple joints     PVD (peripheral vascular disease) (H)     Abnormal mammogram (3./12/2015: 4mm grouping of calcificaions in the posterior central right breast)     Advance Care Planning     Low weight     External hemorrhoids     Chronic atrial fibrillation (H)     Dermatitis due to unknown cause, scalp     Slow transit constipation     Dementia with behavioral disturbance, unspecified dementia type     Frozen joint, 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 finger     Protein-calorie malnutrition (H)     CKD (chronic kidney disease) stage  "3, GFR 30-59 ml/min (H)     REVIEW OF SYSTEMS:  Unobtainable secondary to cognitive impairment.     Objective:  Pulse 118   Resp 18   Ht 1.549 m (5' 1\")   BMI 19.84 kg/m    Exam:  GENERAL APPEARANCE:  Minimally responsive female, resting in bed with head of bed elevated.  Family in attendance. Moaning at times.  Morphine was last given two hours ago..  HEAD: Normocephalic. No facial asymmetry.     ENT:  Dry lips.  EYES: Eyes closed.   RESP: Quiet, effortless respirations. No cough. Periods of apnea lasting 15 sec. Occasional upper airway congestions.  CV: Irregular rhythm with  rapid rate. Grade 2/6 cardiac murmur. No LE edema. No mottling and skin temp is warm.   ABDOMEN: Soft rounded abdomen. BS's positive all 4 quadrants   NEURO: Eyes closed and no verbalization. .      Labs:   CBC RESULTS:   Recent Labs   Lab Test 06/17/19 05/20/19 11/06/17 02/13/15   WBC 7.9  --  7.4 7.2   RBC 3.43*  --  3.59* 3.76*   HGB 11.5* 12.3 11.9 12.5   HCT 35.2  --  37.3 38.5   .6*  --  103.9* 102*   MCH 33.5*  --   --  33.2*   MCHC 32.7  --   --  32.6   RDW 13.6  --  13.2 13.8     --  342 223       Last Basic Metabolic Panel:  Recent Labs   Lab Test 09/03/19 06/17/19    139   POTASSIUM 3.2* 4.0   CHLORIDE 103 102   BRIJESH 9.3 9.0   CO2 26 28   BUN 29* 16   CR 1.00 0.69    89     Family Communication:  Met with sons and daughter in law.  Discussed current status and expected further decline.  Comfort is the goal of all interventions.    ASSESSMENT/PLAN:  (R62.7) Failure to thrive in adult  (primary encounter diagnosis)  Comment: Ongoing and declining  Plan: Continue hospice care.  discontinue keppra and metoprolol as swallow is poor.      (E44.0) Moderate protein-calorie malnutrition (H)  Comment: Ongoing decline due to lack of intake  Plan: Continue current POC with hospice care.    (F03.91) Dementia with behavioral disturbance, unspecified dementia type  Comment: End stage  Plan: Continue hospice " care.    (K59.01) Slow transit constipation  Comment: Ongoing  Plan: Continue dulcolax suppositories for comfort.    (Z51.5) Hospice care patient  Comment: Ongoing  Plan: Continue hospice care.     Electronically signed by:  RAFAEL Flowers CNP

## 2022-10-04 PROBLEM — F03.918 DEMENTIA WITH BEHAVIORAL DISTURBANCE (H): Status: ACTIVE | Noted: 2018-01-25

## 2024-01-11 NOTE — PROGRESS NOTES
Physical Therapy  Physical Therapy Treatment    Patient Name: Darius Navarrete  MRN: [id number]  Today's Date: 2024  Time Calculation  Start Time: 1445  Stop Time: 1535  Time Calculation (min): 50 min    Insurance:  Visit number: 2 of [place]   (4 visits in )  Authorization info: No auth  Insurance Type: Medicare and AARP Plan F  Medicare Certification Period: Beginnin23 Ending:  3/17/2024  Onset date: 23     General:  Reason for visit: Lumbar Pain  Referred by: Anahi Trivedi DO       Current Problem  1. Back pain        2. Scar pain            Precautions  Precautions Comment: Alzheimers    Pain Assessment: 0-10  Pain Score: 0 - No pain    Subjective:   Patient reports having no back pain again today.    HEP Performed:  Yes    Objective:   Lumbar ROM- L. Flex/Ext     Treatments:   Bike (seat#3)- 3'  DBE 2x1.5'  Bosu lunge alternating- 1.5'  KB 4kg tandem stand R/L, L/R cw/ccw- 1' ea dir.  Hamstring curl 30# 1x20  Hip ab/ad 32.5#/32.5# 2x15 ea.  Multihip ext 33# R/L x 20 ea.  Cybex 3 step walkout 2.5# R/L x 10 ea.  SS R/L Quad/HF/Pirif- 1.5' ea.    Charges: TE 2, NME 1 (CQ Modifier)    Assessment:   Did well today.  Does need some assistance getting in/out of the Cybex machines.    Plan:   Continue decreasing scar sensitivity utilizing dry needling for 4-6 visits and increasing lumbar rom, flexibility, mobility, balance, strength and function with land based exercise.    Patrick Cueto, PTA   [facility]    Chief Complaint   Patient presents with     RECHECK       HPI:    Yeni Pérez is a 89 year old  (2/9/1927), who is being seen today for an episodic care visit at [facility].  HPI information obtained from: facility chart records, facility staff, patient report and [facility] chart review.Today's concern is:  Dislocated shoulder, right, sequela  Pt has received a tramadol once each day in the past three days.  Is on scheduled tylenol.  Is to be in an immobilizer at all times.    Chronic atrial fibrillation (H)  Remains on metoprolol bid.  BP ranges in past three days: 102//78.  Pulse ranges: 75-92.  Remains on ASA daily.    Senile dementia without behavioral disturbance  Advancing dementia.  Very poor short term memory.  Cannot recall injury and is always surprised to hear that she likely fell in her apartment (but that no one saw the fall and she did not report it).  Will likely not be safe to return to her apartment and will need LTC.      ALLERGIES: Boniva [ibandronic acid]; Food; Lexapro [escitalopram]; No clinical screening - see comments; and Zoloft [sertraline]  Past Medical, Surgical, Family and Social History reviewed and updated in [facility].    Current Outpatient Prescriptions   Medication Sig Dispense Refill     ACETAMINOPHEN PO Take 650 mg by mouth every 4 hours as needed for pain       calcium carbonate (TUMS) 500 MG chewable tablet Take 1 tablet (500 mg) by mouth 4 times daily as needed for heartburn 150 tablet      metoprolol (LOPRESSOR) 25 MG tablet Take 0.5 tablets (12.5 mg) by mouth 2 times daily Hold for systolic BP <100 or pulse <60 180 tablet 3     acetaminophen (TYLENOL) 325 MG tablet Take 2 tablets (650 mg) by mouth every 6 hours 100 tablet      traMADol (ULTRAM) 50 MG tablet Take 0.5 tablets (25 mg) by mouth every 6 hours as needed for moderate pain 20 tablet 0     bisacodyl (DULCOLAX) 10 MG Suppository Place 10 mg rectally  every 3 days       magnesium hydroxide (MILK OF MAGNESIA) 400 MG/5ML suspension Take 5 mLs by mouth daily as needed for constipation or heartburn       senna-docusate (SENOKOT-S;PERICOLACE) 8.6-50 MG per tablet Take 2 tablets by mouth daily as needed for constipation       docusate sodium (COLACE) 100 MG capsule Take 1 capsule (100 mg) by mouth every other day 60 capsule      emollient (VANICREAM) cream Apply topically 2 times daily       Misc Natural Products (GLUCOSAMINE CHOND COMPLEX/MSM PO) Take 1 tablet by mouth daily       allopurinol (ZYLOPRIM) 300 MG tablet Take 300 mg by mouth daily       aspirin 325 MG EC tablet Take 325 mg by mouth daily       levETIRAcetam (KEPPRA) 500 MG tablet Take 500 mg by mouth daily       Medications reviewed:  Medications reconciled to facility chart and changes were made to reflect current medications as identified as above med list. Below are the changes that were made:   Medications stopped since last EPIC medication reconciliation:   There are no discontinued medications.    Medications started since last [facility] medication reconciliation:  Orders Placed This Encounter   Medications     ACETAMINOPHEN PO     Sig: Take 650 mg by mouth every 4 hours as needed for pain     Patient Active Problem List   Diagnosis     History of hepatitis A  in 1980     History of hepatitis B in 1980     Cyst on ear, left, 2/25/2013     H/O squamous cell carcinoma of skin, right hand, 11/28/2011     Ganglion cyst, 1st MTP left 3/25/2009     Seizure disorder (H)     H/O renal calculi, 1987     PVC's (premature ventricular contractions)     H/O small bowel obstruction, lysis of adhesions, 1970     Osteoporosis, Bone Density 2013: Femeral neck: -2.6 (L) and -2.3 (R)     Cardiomegaly     Diverticulosis of large intestine without hemorrhage     Bilateral dry eyes     Heart murmur     Ataxia     Chronic fatigue     Major depression in complete remission (H)     Benign essential hypertension     Primary  osteoarthritis involving multiple joints     PVD (peripheral vascular disease) (H)     Abnormal mammogram (3./12/2015: 4mm grouping of calcificaions in the posterior central right breast)     Advance Care Planning     Senile dementia without behavioral disturbance     Low weight     External hemorrhoids     Chronic atrial fibrillation (H)     Dermatitis due to unknown cause, scalp     Dislocated shoulder, right, sequela       REVIEW OF SYSTEMS:  Unobtainable secondary to cognitive impairment, but today pt reports pain in her right shoulder..    Physical Exam:  /68  Pulse 75  Temp 97.8  F (36.6  C)  Resp 20  Wt 108 lb (49 kg)  BMI 20.42 kg/m2    GENERAL APPEARANCE: Alert, thin, female seen.  Pale and confused. Able to respond verbally. Appears tired & uncomfortable..  .    HEAD: Normocephalic. No facial asymmetry  ENT: Mouth and posterior oropharynx normal, moist mucous membranes, [place], Has her own teeth. Has right ear hearing aide in place.    EYES: EOM normal, conjunctiva and lids normal, Wearing eyeglasses.  RESP: Quiet, effortless respirations. No cough. CTA bilaterally.  CV: Irregular rhythm, Grade 2/6 cardiac murmur. No LE edema. DP pulses +1 bilateral.  ABDOMEN: Soft thin abdomen. BS's positive all 4 quadrants. No tenderness with palpation. No guarding.   M/S: Imobilizer is not on her right arm.   Fading bruising noted involving entire right arm and axilla.  Tender to any touch of anterior or posterior shoulder.  Immobilizer replaced by Occupational Therapy.   Right hip has fading bruising distal to hip.  No tenderness with palpation.  Right knee has diffuse ecchymosis.  No pain with ROM.  .   NEURO: Oriented to person only.  Numerous repetitive statements about, wanting to know why her shoulder hurt. Able to squeeze both hands firmly, without pain. .  No tremors or cogwheeling. .  PSYCH:  Confused, but calm and not understanding why she was in pain..    Recent Labs:   CBC RESULTS:   Recent Labs    Lab Test 11/03/17 08/24/17 04/10/17  02/13/15   WBC  11.2*   --   7.1   < >  7.2   RBC  3.49*   --   3.74   < >  3.76*   HGB  11.8  11.9  12.1   < >  12.5   HCT  36.4   --   38.0   < >  38.5   MCV  104.3*   --   101.6*   < >  102*   MCH   --    --    --    --   33.2*   MCHC   --    --    --    --   32.6   RDW  13.4   --   13.9   < >  13.8   PLT  243   --   261   < >  223    < > = values in this interval not displayed.       Last Basic Metabolic Panel:  Recent Labs   Lab Test 11/03/17 08/24/17   NA  142  145   POTASSIUM  4.8  4.4   CHLORIDE  104  109   BRIJESH  9.6  9.4   CO2  26  28   BUN  32*  20   CR  0.94  0.85   GLC  191*  95       Liver Function Studies -   Recent Labs   Lab Test 10/17/16 02/13/15   PROTTOTAL  5.9  6.3   ALBUMIN  3.4  4.3   BILITOTAL  1.4  1.1   ALKPHOS  68  72   AST  21  23   ALT  13  16       TSH   Date Value Ref Range Status   11/12/2015 1.33 mcU/mL Final     GFR Estimate   Date Value Ref Range Status   11/03/2017 53 (L) >60 mL/min/1.73m2 Final   08/24/2017 >60 >60 ml/min/1.73m2 Final   04/10/2017 >60 >60 ml/min/1.73m2 Final   02/20/2017 >60 >60 ml/min/1.73m2 Final   10/17/2016 58 ml/min/1.73m2 Final     Assessment/Plan:  (S43.004S) Dislocated shoulder, right, sequela  (primary encounter diagnosis)  Comment: XRay on 11/3 confirmed shoulder was in place  Plan: Continue immobilizer at all times and scheduled tylenol.  PRN tramadol for pain. Continue Occupational Therapy and Physical Therapy.     (I48.2) Chronic atrial fibrillation (H)  Comment: Chronic  Plan: Continue metoprolol and ASA.  Monitor.    (F03.90) Senile dementia without behavioral disturbance  Comment: Advancing  Plan: SW and TCU staff, with therapy will continue to monitor and make recommendations for future living.     Electronically signed by  RAFAEL Flowers CNP